# Patient Record
Sex: FEMALE | Race: WHITE | Employment: OTHER | URBAN - METROPOLITAN AREA
[De-identification: names, ages, dates, MRNs, and addresses within clinical notes are randomized per-mention and may not be internally consistent; named-entity substitution may affect disease eponyms.]

---

## 2017-09-13 ENCOUNTER — APPOINTMENT (OUTPATIENT)
Dept: CT IMAGING | Age: 77
End: 2017-09-13
Payer: MEDICARE

## 2017-09-13 ENCOUNTER — HOSPITAL ENCOUNTER (EMERGENCY)
Age: 77
Discharge: HOME | End: 2017-09-13
Attending: EMERGENCY MEDICINE
Payer: MEDICARE

## 2017-09-13 ENCOUNTER — APPOINTMENT (OUTPATIENT)
Dept: GENERAL RADIOLOGY | Age: 77
End: 2017-09-13
Payer: MEDICARE

## 2017-09-13 VITALS
HEART RATE: 88 BPM | BODY MASS INDEX: 29.45 KG/M2 | RESPIRATION RATE: 17 BRPM | DIASTOLIC BLOOD PRESSURE: 60 MMHG | OXYGEN SATURATION: 96 % | TEMPERATURE: 98 F | HEIGHT: 60 IN | SYSTOLIC BLOOD PRESSURE: 141 MMHG | WEIGHT: 150 LBS

## 2017-09-13 DIAGNOSIS — R07.9 CHEST PAIN, UNSPECIFIED TYPE: ICD-10-CM

## 2017-09-13 DIAGNOSIS — M54.2 NECK PAIN: Primary | ICD-10-CM

## 2017-09-13 LAB
ABSOLUTE EOS #: 0 K/UL (ref 0–0.4)
ABSOLUTE LYMPH #: 1.4 K/UL (ref 1–4.8)
ABSOLUTE MONO #: 0.6 K/UL (ref 0.1–1.3)
ANION GAP SERPL CALCULATED.3IONS-SCNC: 11 MMOL/L (ref 9–17)
BASOPHILS # BLD: 1 %
BASOPHILS ABSOLUTE: 0.1 K/UL (ref 0–0.2)
BUN BLDV-MCNC: 11 MG/DL (ref 8–23)
BUN/CREAT BLD: ABNORMAL (ref 9–20)
CALCIUM SERPL-MCNC: 8.6 MG/DL (ref 8.6–10.4)
CHLORIDE BLD-SCNC: 102 MMOL/L (ref 98–107)
CO2: 29 MMOL/L (ref 20–31)
CREAT SERPL-MCNC: 0.64 MG/DL (ref 0.5–0.9)
DIFFERENTIAL TYPE: NORMAL
EOSINOPHILS RELATIVE PERCENT: 1 %
GFR AFRICAN AMERICAN: >60 ML/MIN
GFR NON-AFRICAN AMERICAN: >60 ML/MIN
GFR SERPL CREATININE-BSD FRML MDRD: ABNORMAL ML/MIN/{1.73_M2}
GFR SERPL CREATININE-BSD FRML MDRD: ABNORMAL ML/MIN/{1.73_M2}
GLUCOSE BLD-MCNC: 113 MG/DL (ref 70–99)
HCT VFR BLD CALC: 39.1 % (ref 36–46)
HEMOGLOBIN: 12.9 G/DL (ref 12–16)
INR BLD: 2.6
LYMPHOCYTES # BLD: 18 %
MCH RBC QN AUTO: 27.9 PG (ref 26–34)
MCHC RBC AUTO-ENTMCNC: 32.9 G/DL (ref 31–37)
MCV RBC AUTO: 84.9 FL (ref 80–100)
MONOCYTES # BLD: 8 %
PDW BLD-RTO: 14.7 % (ref 11.5–14.9)
PLATELET # BLD: 342 K/UL (ref 150–450)
PLATELET ESTIMATE: NORMAL
PMV BLD AUTO: 6.9 FL (ref 6–12)
POTASSIUM SERPL-SCNC: 4.3 MMOL/L (ref 3.7–5.3)
PROTHROMBIN TIME: 30.1 SEC (ref 9.7–12)
RBC # BLD: 4.61 M/UL (ref 4–5.2)
RBC # BLD: NORMAL 10*6/UL
SEG NEUTROPHILS: 72 %
SEGMENTED NEUTROPHILS ABSOLUTE COUNT: 5.7 K/UL (ref 1.3–9.1)
SODIUM BLD-SCNC: 142 MMOL/L (ref 135–144)
TROPONIN INTERP: NORMAL
TROPONIN INTERP: NORMAL
TROPONIN T: <0.03 NG/ML
TROPONIN T: <0.03 NG/ML
TSH SERPL DL<=0.05 MIU/L-ACNC: 0.55 MIU/L (ref 0.3–5)
WBC # BLD: 7.9 K/UL (ref 3.5–11)
WBC # BLD: NORMAL 10*3/UL

## 2017-09-13 PROCEDURE — 85025 COMPLETE CBC W/AUTO DIFF WBC: CPT

## 2017-09-13 PROCEDURE — 70450 CT HEAD/BRAIN W/O DYE: CPT

## 2017-09-13 PROCEDURE — 71020 XR CHEST STANDARD TWO VW: CPT

## 2017-09-13 PROCEDURE — 36415 COLL VENOUS BLD VENIPUNCTURE: CPT

## 2017-09-13 PROCEDURE — 85610 PROTHROMBIN TIME: CPT

## 2017-09-13 PROCEDURE — 84443 ASSAY THYROID STIM HORMONE: CPT

## 2017-09-13 PROCEDURE — 99285 EMERGENCY DEPT VISIT HI MDM: CPT

## 2017-09-13 PROCEDURE — 80048 BASIC METABOLIC PNL TOTAL CA: CPT

## 2017-09-13 PROCEDURE — 93005 ELECTROCARDIOGRAM TRACING: CPT

## 2017-09-13 PROCEDURE — 84484 ASSAY OF TROPONIN QUANT: CPT

## 2017-09-13 ASSESSMENT — PAIN DESCRIPTION - PAIN TYPE: TYPE: ACUTE PAIN

## 2017-09-13 ASSESSMENT — PAIN DESCRIPTION - LOCATION: LOCATION: HEAD

## 2017-09-13 ASSESSMENT — PAIN SCALES - GENERAL: PAINLEVEL_OUTOF10: 2

## 2017-09-14 LAB
EKG ATRIAL RATE: 96 BPM
EKG P AXIS: 81 DEGREES
EKG P-R INTERVAL: 156 MS
EKG Q-T INTERVAL: 380 MS
EKG QRS DURATION: 68 MS
EKG QTC CALCULATION (BAZETT): 480 MS
EKG R AXIS: 30 DEGREES
EKG T AXIS: 59 DEGREES
EKG VENTRICULAR RATE: 96 BPM

## 2019-04-03 ENCOUNTER — HOSPITAL ENCOUNTER (OUTPATIENT)
Age: 79
Setting detail: SPECIMEN
Discharge: HOME | End: 2019-04-03
Payer: MEDICARE

## 2019-04-08 LAB — SURGICAL PATHOLOGY REPORT: NORMAL

## 2022-08-11 ENCOUNTER — HOSPITAL ENCOUNTER (OUTPATIENT)
Age: 82
Setting detail: SPECIMEN
Discharge: HOME | End: 2022-08-11

## 2022-08-11 LAB
ALBUMIN SERPL-MCNC: 2.7 G/DL (ref 3.5–5.2)
ALBUMIN/GLOBULIN RATIO: 0.9 (ref 1–2.5)
ALP BLD-CCNC: 186 U/L (ref 35–104)
ALT SERPL-CCNC: 42 U/L (ref 5–33)
ANION GAP SERPL CALCULATED.3IONS-SCNC: 11 MMOL/L (ref 9–17)
AST SERPL-CCNC: 27 U/L
BILIRUB SERPL-MCNC: 0.41 MG/DL (ref 0.3–1.2)
BUN BLDV-MCNC: 5 MG/DL (ref 8–23)
CALCIUM SERPL-MCNC: 7.8 MG/DL (ref 8.6–10.4)
CHLORIDE BLD-SCNC: 94 MMOL/L (ref 98–107)
CO2: 26 MMOL/L (ref 20–31)
CREAT SERPL-MCNC: 0.62 MG/DL (ref 0.5–0.9)
GFR AFRICAN AMERICAN: >60 ML/MIN
GFR NON-AFRICAN AMERICAN: >60 ML/MIN
GFR SERPL CREATININE-BSD FRML MDRD: ABNORMAL ML/MIN/{1.73_M2}
GLUCOSE BLD-MCNC: 129 MG/DL (ref 70–99)
HCT VFR BLD CALC: 28.7 % (ref 36.3–47.1)
HEMOGLOBIN: 9 G/DL (ref 11.9–15.1)
INR BLD: 1.7
MCH RBC QN AUTO: 28.8 PG (ref 25.2–33.5)
MCHC RBC AUTO-ENTMCNC: 31.4 G/DL (ref 28.4–34.8)
MCV RBC AUTO: 91.7 FL (ref 82.6–102.9)
NRBC AUTOMATED: 0.2 PER 100 WBC
PDW BLD-RTO: 15.7 % (ref 11.8–14.4)
PLATELET # BLD: ABNORMAL K/UL (ref 138–453)
PLATELET, FLUORESCENCE: 1061 K/UL (ref 138–453)
PLATELET, IMMATURE FRACTION: 0.7 % (ref 1.1–10.3)
POTASSIUM SERPL-SCNC: 4.7 MMOL/L (ref 3.7–5.3)
PROTHROMBIN TIME: 17.4 SEC (ref 9.1–12.3)
RBC # BLD: 3.13 M/UL (ref 3.95–5.11)
SODIUM BLD-SCNC: 131 MMOL/L (ref 135–144)
TOTAL PROTEIN: 5.8 G/DL (ref 6.4–8.3)
TSH SERPL DL<=0.05 MIU/L-ACNC: 66.34 UIU/ML (ref 0.3–5)
WBC # BLD: 9.9 K/UL (ref 3.5–11.3)

## 2022-08-11 PROCEDURE — 80053 COMPREHEN METABOLIC PANEL: CPT

## 2022-08-11 PROCEDURE — 84443 ASSAY THYROID STIM HORMONE: CPT

## 2022-08-11 PROCEDURE — 85610 PROTHROMBIN TIME: CPT

## 2022-08-11 PROCEDURE — 36415 COLL VENOUS BLD VENIPUNCTURE: CPT

## 2022-08-11 PROCEDURE — 85027 COMPLETE CBC AUTOMATED: CPT

## 2022-08-11 PROCEDURE — 85055 RETICULATED PLATELET ASSAY: CPT

## 2022-08-11 PROCEDURE — P9603 ONE-WAY ALLOW PRORATED MILES: HCPCS

## 2022-08-15 ENCOUNTER — HOSPITAL ENCOUNTER (OUTPATIENT)
Age: 82
Setting detail: SPECIMEN
Discharge: HOME | End: 2022-08-15

## 2022-08-15 LAB
INR BLD: 5.6
PROTHROMBIN TIME: 52.4 SEC (ref 9.1–12.3)

## 2022-08-15 PROCEDURE — 85610 PROTHROMBIN TIME: CPT

## 2022-08-15 PROCEDURE — 36415 COLL VENOUS BLD VENIPUNCTURE: CPT

## 2022-08-15 PROCEDURE — P9603 ONE-WAY ALLOW PRORATED MILES: HCPCS

## 2022-08-16 ENCOUNTER — HOSPITAL ENCOUNTER (OUTPATIENT)
Age: 82
Setting detail: SPECIMEN
Discharge: HOME | End: 2022-08-16

## 2022-08-16 LAB
HCT VFR BLD CALC: 26.1 % (ref 36.3–47.1)
HEMOGLOBIN: 8.1 G/DL (ref 11.9–15.1)
INR BLD: 2
MCH RBC QN AUTO: 28.7 PG (ref 25.2–33.5)
MCHC RBC AUTO-ENTMCNC: 31 G/DL (ref 28.4–34.8)
MCV RBC AUTO: 92.6 FL (ref 82.6–102.9)
NRBC AUTOMATED: 0 PER 100 WBC
PDW BLD-RTO: 15.6 % (ref 11.8–14.4)
PLATELET # BLD: 845 K/UL (ref 138–453)
PMV BLD AUTO: 9.3 FL (ref 8.1–13.5)
PROTHROMBIN TIME: 19.8 SEC (ref 9.1–12.3)
RBC # BLD: 2.82 M/UL (ref 3.95–5.11)
WBC # BLD: 8.2 K/UL (ref 3.5–11.3)

## 2022-08-16 PROCEDURE — 36415 COLL VENOUS BLD VENIPUNCTURE: CPT

## 2022-08-16 PROCEDURE — 85027 COMPLETE CBC AUTOMATED: CPT

## 2022-08-16 PROCEDURE — 85610 PROTHROMBIN TIME: CPT

## 2022-08-16 PROCEDURE — P9603 ONE-WAY ALLOW PRORATED MILES: HCPCS

## 2022-08-18 ENCOUNTER — HOSPITAL ENCOUNTER (OUTPATIENT)
Age: 82
Setting detail: SPECIMEN
Discharge: HOME | End: 2022-08-18

## 2022-08-18 LAB
INR BLD: 1.6
PROTHROMBIN TIME: 16.5 SEC (ref 9.1–12.3)

## 2022-08-18 PROCEDURE — 36415 COLL VENOUS BLD VENIPUNCTURE: CPT

## 2022-08-18 PROCEDURE — 85610 PROTHROMBIN TIME: CPT

## 2022-08-18 PROCEDURE — P9603 ONE-WAY ALLOW PRORATED MILES: HCPCS

## 2022-10-25 ENCOUNTER — APPOINTMENT (OUTPATIENT)
Dept: GENERAL RADIOLOGY | Age: 82
DRG: 439 | End: 2022-10-25
Payer: COMMERCIAL

## 2022-10-25 ENCOUNTER — HOSPITAL ENCOUNTER (INPATIENT)
Age: 82
LOS: 7 days | Discharge: NURSING, REHAB OR LONG-TERM CARE | DRG: 439 | End: 2022-11-01
Attending: EMERGENCY MEDICINE | Admitting: FAMILY MEDICINE
Payer: COMMERCIAL

## 2022-10-25 ENCOUNTER — APPOINTMENT (OUTPATIENT)
Dept: CT IMAGING | Age: 82
DRG: 439 | End: 2022-10-25
Payer: COMMERCIAL

## 2022-10-25 DIAGNOSIS — R29.6 FREQUENT FALLS: Primary | ICD-10-CM

## 2022-10-25 DIAGNOSIS — S02.2XXA CLOSED FRACTURE OF NASAL BONE, INITIAL ENCOUNTER: ICD-10-CM

## 2022-10-25 DIAGNOSIS — F41.9 ANXIETY: ICD-10-CM

## 2022-10-25 DIAGNOSIS — T07.XXXA MULTIPLE CONTUSIONS: ICD-10-CM

## 2022-10-25 PROBLEM — J44.9 COPD (CHRONIC OBSTRUCTIVE PULMONARY DISEASE) (HCC): Status: ACTIVE | Noted: 2022-07-01

## 2022-10-25 PROBLEM — D50.9 IRON DEFICIENCY ANEMIA: Status: ACTIVE | Noted: 2022-10-25

## 2022-10-25 PROBLEM — E03.9 HYPOTHYROIDISM: Status: ACTIVE | Noted: 2022-10-25

## 2022-10-25 PROBLEM — R53.1 GENERALIZED WEAKNESS: Status: ACTIVE | Noted: 2022-10-25

## 2022-10-25 LAB
ABSOLUTE EOS #: 0 K/UL (ref 0–0.4)
ABSOLUTE LYMPH #: 2.2 K/UL (ref 1–4.8)
ABSOLUTE MONO #: 0.7 K/UL (ref 0.1–1.3)
ANION GAP SERPL CALCULATED.3IONS-SCNC: 8 MMOL/L (ref 9–17)
BASOPHILS # BLD: 1 % (ref 0–2)
BASOPHILS ABSOLUTE: 0.1 K/UL (ref 0–0.2)
BUN BLDV-MCNC: 11 MG/DL (ref 8–23)
CALCIUM SERPL-MCNC: 7.4 MG/DL (ref 8.6–10.4)
CHLORIDE BLD-SCNC: 99 MMOL/L (ref 98–107)
CO2: 26 MMOL/L (ref 20–31)
CREAT SERPL-MCNC: 0.96 MG/DL (ref 0.5–0.9)
EOSINOPHILS RELATIVE PERCENT: 1 % (ref 0–4)
GFR SERPL CREATININE-BSD FRML MDRD: 60 ML/MIN/1.73M2
GLUCOSE BLD-MCNC: 185 MG/DL (ref 70–99)
HCT VFR BLD CALC: 32 % (ref 36–46)
HEMOGLOBIN: 10.5 G/DL (ref 12–16)
INR BLD: 2.7
LYMPHOCYTES # BLD: 37 % (ref 24–44)
MCH RBC QN AUTO: 27.6 PG (ref 26–34)
MCHC RBC AUTO-ENTMCNC: 32.7 G/DL (ref 31–37)
MCV RBC AUTO: 84.4 FL (ref 80–100)
MONOCYTES # BLD: 11 % (ref 1–7)
PARTIAL THROMBOPLASTIN TIME: 35.1 SEC (ref 24–36)
PDW BLD-RTO: 16.3 % (ref 11.5–14.9)
PLATELET # BLD: 300 K/UL (ref 150–450)
PMV BLD AUTO: 7.6 FL (ref 6–12)
POTASSIUM SERPL-SCNC: 4.8 MMOL/L (ref 3.7–5.3)
PROTHROMBIN TIME: 28.6 SEC (ref 11.8–14.6)
RBC # BLD: 3.79 M/UL (ref 4–5.2)
SEG NEUTROPHILS: 50 % (ref 36–66)
SEGMENTED NEUTROPHILS ABSOLUTE COUNT: 3 K/UL (ref 1.3–9.1)
SODIUM BLD-SCNC: 133 MMOL/L (ref 135–144)
THYROXINE, FREE: 1.02 NG/DL (ref 0.93–1.7)
TSH SERPL DL<=0.05 MIU/L-ACNC: 37.09 UIU/ML (ref 0.3–5)
WBC # BLD: 6 K/UL (ref 3.5–11)

## 2022-10-25 PROCEDURE — 80048 BASIC METABOLIC PNL TOTAL CA: CPT

## 2022-10-25 PROCEDURE — 73562 X-RAY EXAM OF KNEE 3: CPT

## 2022-10-25 PROCEDURE — 99285 EMERGENCY DEPT VISIT HI MDM: CPT

## 2022-10-25 PROCEDURE — 85610 PROTHROMBIN TIME: CPT

## 2022-10-25 PROCEDURE — 36415 COLL VENOUS BLD VENIPUNCTURE: CPT

## 2022-10-25 PROCEDURE — 6370000000 HC RX 637 (ALT 250 FOR IP): Performed by: FAMILY MEDICINE

## 2022-10-25 PROCEDURE — 73502 X-RAY EXAM HIP UNI 2-3 VIEWS: CPT

## 2022-10-25 PROCEDURE — 73080 X-RAY EXAM OF ELBOW: CPT

## 2022-10-25 PROCEDURE — 83540 ASSAY OF IRON: CPT

## 2022-10-25 PROCEDURE — 2580000003 HC RX 258: Performed by: FAMILY MEDICINE

## 2022-10-25 PROCEDURE — 70486 CT MAXILLOFACIAL W/O DYE: CPT

## 2022-10-25 PROCEDURE — 72125 CT NECK SPINE W/O DYE: CPT

## 2022-10-25 PROCEDURE — 93005 ELECTROCARDIOGRAM TRACING: CPT | Performed by: FAMILY MEDICINE

## 2022-10-25 PROCEDURE — 70450 CT HEAD/BRAIN W/O DYE: CPT

## 2022-10-25 PROCEDURE — 84443 ASSAY THYROID STIM HORMONE: CPT

## 2022-10-25 PROCEDURE — 85730 THROMBOPLASTIN TIME PARTIAL: CPT

## 2022-10-25 PROCEDURE — 85025 COMPLETE CBC W/AUTO DIFF WBC: CPT

## 2022-10-25 PROCEDURE — 2580000003 HC RX 258: Performed by: EMERGENCY MEDICINE

## 2022-10-25 PROCEDURE — 2060000000 HC ICU INTERMEDIATE R&B

## 2022-10-25 PROCEDURE — 84439 ASSAY OF FREE THYROXINE: CPT

## 2022-10-25 PROCEDURE — 83550 IRON BINDING TEST: CPT

## 2022-10-25 RX ORDER — PANTOPRAZOLE SODIUM 20 MG/1
20 TABLET, DELAYED RELEASE ORAL DAILY
COMMUNITY

## 2022-10-25 RX ORDER — POTASSIUM CHLORIDE 7.45 MG/ML
10 INJECTION INTRAVENOUS PRN
Status: DISCONTINUED | OUTPATIENT
Start: 2022-10-25 | End: 2022-11-01 | Stop reason: HOSPADM

## 2022-10-25 RX ORDER — WARFARIN SODIUM 2.5 MG/1
1.25 TABLET ORAL
Status: COMPLETED | OUTPATIENT
Start: 2022-10-25 | End: 2022-10-25

## 2022-10-25 RX ORDER — INSULIN LISPRO 100 [IU]/ML
0-4 INJECTION, SOLUTION INTRAVENOUS; SUBCUTANEOUS
Status: DISCONTINUED | OUTPATIENT
Start: 2022-10-25 | End: 2022-11-01 | Stop reason: HOSPADM

## 2022-10-25 RX ORDER — LEVOTHYROXINE SODIUM 88 UG/1
88 TABLET ORAL DAILY
Status: DISCONTINUED | OUTPATIENT
Start: 2022-10-26 | End: 2022-10-25

## 2022-10-25 RX ORDER — MAGNESIUM SULFATE 1 G/100ML
1000 INJECTION INTRAVENOUS PRN
Status: DISCONTINUED | OUTPATIENT
Start: 2022-10-25 | End: 2022-11-01 | Stop reason: HOSPADM

## 2022-10-25 RX ORDER — SODIUM CHLORIDE 9 MG/ML
INJECTION, SOLUTION INTRAVENOUS CONTINUOUS
Status: ACTIVE | OUTPATIENT
Start: 2022-10-25 | End: 2022-10-26

## 2022-10-25 RX ORDER — ONDANSETRON 4 MG/1
4 TABLET, ORALLY DISINTEGRATING ORAL EVERY 8 HOURS PRN
Status: DISCONTINUED | OUTPATIENT
Start: 2022-10-25 | End: 2022-11-01 | Stop reason: HOSPADM

## 2022-10-25 RX ORDER — LEVOTHYROXINE SODIUM 0.1 MG/1
100 TABLET ORAL DAILY
Status: DISCONTINUED | OUTPATIENT
Start: 2022-10-26 | End: 2022-11-01 | Stop reason: HOSPADM

## 2022-10-25 RX ORDER — INSULIN LISPRO 100 [IU]/ML
1-4 INJECTION, SOLUTION INTRAVENOUS; SUBCUTANEOUS
COMMUNITY

## 2022-10-25 RX ORDER — ACETAMINOPHEN 650 MG/1
650 SUPPOSITORY RECTAL EVERY 6 HOURS PRN
Status: DISCONTINUED | OUTPATIENT
Start: 2022-10-25 | End: 2022-11-01 | Stop reason: HOSPADM

## 2022-10-25 RX ORDER — POTASSIUM CHLORIDE 750 MG/1
20 TABLET, EXTENDED RELEASE ORAL DAILY
COMMUNITY

## 2022-10-25 RX ORDER — DEXTROSE MONOHYDRATE 100 MG/ML
INJECTION, SOLUTION INTRAVENOUS CONTINUOUS PRN
Status: DISCONTINUED | OUTPATIENT
Start: 2022-10-25 | End: 2022-11-01 | Stop reason: HOSPADM

## 2022-10-25 RX ORDER — 0.9 % SODIUM CHLORIDE 0.9 %
1000 INTRAVENOUS SOLUTION INTRAVENOUS ONCE
Status: COMPLETED | OUTPATIENT
Start: 2022-10-25 | End: 2022-10-25

## 2022-10-25 RX ORDER — SODIUM CHLORIDE 0.9 % (FLUSH) 0.9 %
10 SYRINGE (ML) INJECTION EVERY 12 HOURS SCHEDULED
Status: DISCONTINUED | OUTPATIENT
Start: 2022-10-25 | End: 2022-11-01 | Stop reason: HOSPADM

## 2022-10-25 RX ORDER — ACETAMINOPHEN 325 MG/1
650 TABLET ORAL EVERY 6 HOURS PRN
Status: DISCONTINUED | OUTPATIENT
Start: 2022-10-25 | End: 2022-11-01 | Stop reason: HOSPADM

## 2022-10-25 RX ORDER — FERROUS SULFATE 325(65) MG
325 TABLET ORAL
Status: DISCONTINUED | OUTPATIENT
Start: 2022-10-26 | End: 2022-11-01 | Stop reason: HOSPADM

## 2022-10-25 RX ORDER — FUROSEMIDE 40 MG/1
40 TABLET ORAL DAILY
Status: ON HOLD | COMMUNITY
End: 2022-10-31 | Stop reason: HOSPADM

## 2022-10-25 RX ORDER — ONDANSETRON 2 MG/ML
4 INJECTION INTRAMUSCULAR; INTRAVENOUS EVERY 6 HOURS PRN
Status: DISCONTINUED | OUTPATIENT
Start: 2022-10-25 | End: 2022-11-01 | Stop reason: HOSPADM

## 2022-10-25 RX ORDER — IPRATROPIUM BROMIDE AND ALBUTEROL SULFATE 2.5; .5 MG/3ML; MG/3ML
1 SOLUTION RESPIRATORY (INHALATION) EVERY 4 HOURS PRN
Status: DISCONTINUED | OUTPATIENT
Start: 2022-10-25 | End: 2022-11-01 | Stop reason: HOSPADM

## 2022-10-25 RX ORDER — POTASSIUM CHLORIDE 20 MEQ/1
40 TABLET, EXTENDED RELEASE ORAL PRN
Status: DISCONTINUED | OUTPATIENT
Start: 2022-10-25 | End: 2022-11-01 | Stop reason: HOSPADM

## 2022-10-25 RX ORDER — SODIUM CHLORIDE 0.9 % (FLUSH) 0.9 %
10 SYRINGE (ML) INJECTION PRN
Status: DISCONTINUED | OUTPATIENT
Start: 2022-10-25 | End: 2022-11-01 | Stop reason: HOSPADM

## 2022-10-25 RX ORDER — FERROUS SULFATE 325(65) MG
325 TABLET ORAL EVERY OTHER DAY
Status: ON HOLD | COMMUNITY
End: 2022-10-31 | Stop reason: HOSPADM

## 2022-10-25 RX ORDER — INSULIN LISPRO 100 [IU]/ML
0-4 INJECTION, SOLUTION INTRAVENOUS; SUBCUTANEOUS NIGHTLY
Status: DISCONTINUED | OUTPATIENT
Start: 2022-10-25 | End: 2022-11-01 | Stop reason: HOSPADM

## 2022-10-25 RX ORDER — SODIUM CHLORIDE 9 MG/ML
INJECTION, SOLUTION INTRAVENOUS PRN
Status: DISCONTINUED | OUTPATIENT
Start: 2022-10-25 | End: 2022-11-01 | Stop reason: HOSPADM

## 2022-10-25 RX ADMIN — WARFARIN SODIUM 1.25 MG: 2.5 TABLET ORAL at 19:29

## 2022-10-25 RX ADMIN — SODIUM CHLORIDE 1000 ML: 9 INJECTION, SOLUTION INTRAVENOUS at 13:45

## 2022-10-25 RX ADMIN — SODIUM CHLORIDE: 9 INJECTION, SOLUTION INTRAVENOUS at 18:52

## 2022-10-25 ASSESSMENT — ENCOUNTER SYMPTOMS
FACIAL SWELLING: 0
RHINORRHEA: 0
DIARRHEA: 0
NAUSEA: 0
CONSTIPATION: 0
TROUBLE SWALLOWING: 0
CHEST TIGHTNESS: 0
EYE REDNESS: 0
EYE PAIN: 0
SINUS PRESSURE: 0
COUGH: 0
COLOR CHANGE: 0
VOMITING: 0
WHEEZING: 0
BACK PAIN: 0
ABDOMINAL PAIN: 0
SORE THROAT: 0
BLOOD IN STOOL: 0
EYE DISCHARGE: 0
EYE ITCHING: 0
SHORTNESS OF BREATH: 0

## 2022-10-25 NOTE — ED NOTES
Pt ambulated to Restroom with an assist. Pt appears to be unstable and dizzy.       Marleny Byers RN  10/25/22 [id number]

## 2022-10-25 NOTE — ED NOTES
Pt presents to ED from home by ems with reports of falling while standing. Pt states that she was dizziness prior to falling but has been dizzy since her pcp switched her meds a few weeks ago and was on the way to be evaluated when she fell. Pt is on warfarin. Pt denies loc. Per ems pt was pos orthos.       James Wells RN  10/25/22 1300

## 2022-10-25 NOTE — CONSULTS
General Surgery Consult      Pt Name: Swati Sunshine  MRN: [id number]  YOB: 1940  Date of evaluation: 10/25/2022  Primary Care Physician: DANDRE Reed CNP   Patient evaluated at the request of  Dr. Soheila Obrien 19  Reason for evaluation: History of fall    SUBJECTIVE:   History of Chief Complaint:    Swati Sunshine is a [de-identified] y.o. female who presents with history of repeated fall. She had a mechanical fall when she was walking and tripped over a cane. Consuelo Chock on her knees and then face. No loss of consciousness. Some dizziness in the last couple weeks. Her medications are being adjusted. Multiple falls in the last couple weeks. Case discussed with the emergency room physician. Denies any chest pain abdominal pain or neck pain. ER work-up reviewed. Past Medical History   has a past medical history of Atrial fibrillation ([address] Hypertension, and Thyroid disease. Past Surgical History   has a past surgical history that includes Carpal tunnel release (Bilateral). Medications  Prior to Admission medications    Medication Sig Start Date End Date Taking?  Authorizing Provider   furosemide (LASIX) 40 MG tablet Take 40 mg by mouth daily   Yes Historical Provider, MD   pantoprazole (PROTONIX) 20 MG tablet Take 20 mg by mouth daily   Yes Historical Provider, MD   lipase-protease-amylase (CREON) 6000-98555 units delayed release capsule Take 1 capsule by mouth 3 times daily (with meals)   Yes Historical Provider, MD   potassium chloride (KLOR-CON M) 10 MEQ extended release tablet Take 20 mEq by mouth daily   Yes Historical Provider, MD   insulin NPH (HUMULIN N;NOVOLIN N) 100 UNIT/ML injection vial Inject 5 Units into the skin every morning   Yes Historical Provider, MD   insulin lispro, 1 Unit Dial, (HUMALOG KWIKPEN) 100 UNIT/ML SOPN Inject 1-4 Units into the skin 3 times daily (before meals)   Yes Historical Provider, MD   ferrous sulfate (IRON 325) 325 (65 Fe) MG tablet Take 325 mg by mouth every other day   Yes Historical Provider, MD   levothyroxine (SYNTHROID) 88 MCG tablet Take 88 mcg by mouth Daily    Historical Provider, MD   clonazePAM (KLONOPIN) 0.5 MG tablet Take 0.5 mg by mouth daily. Historical Provider, MD   warfarin (COUMADIN) 2.5 MG tablet Take 1.25 mg by mouth daily Per [facility]    Historical Provider, MD   metoprolol (TOPROL-XL) 50 MG XL tablet Take 50 mg by mouth in the morning and at bedtime Indications: ASH    Historical Provider, MD     Allergies  is allergic to latex, pcn [penicillins], dilaudid [hydromorphone], norco [hydrocodone-acetaminophen], nitrofurantoin, and sulfa antibiotics. Family History  family history is not on file. Social History   reports that she has never smoked. She does not have any smokeless tobacco history on file. She reports that she does not drink alcohol and does not use drugs. Review of Systems:  All 10 system review was conducted. Please refer to chart. OBJECTIVE:   VITALS:  height is 5' (1.524 m) and weight is 105 lb 13.1 oz (48 kg). Her oral temperature is 98.7 °F (37.1 °C). Her blood pressure is 124/82 and her pulse is 80. Her respiration is 20 and oxygen saturation is 99%. CONSTITUTIONAL: Alert and oriented times 3, no acute distress and cooperative to examination with proper mood and affect. SKIN: Skin color, texture, turgor normal. No rashes or lesions. LYMPH: no cervical nodes, no inguinal nodes  HEENT: Some bruising around the right side of the face nose region findings consistent with nasal bone fracture, Head is normocephalic,  EOMI, PERRLA  NECK: Supple, symmetrical, trachea midline, no adenopathy, thyroid symmetric, not enlarged and no tenderness, skin normal  CHEST/LUNGS: chest symmetric with normal A/P diameter, normal respiratory rate and rhythm, lungs clear to auscultation without wheezes, rales or rhonchi. No accessory muscle use.  Scars None   CARDIOVASCULAR: Heart regular rate and rhythm Normal S1 and S2. . Carotid and femoral pulses 2+/4 and equal bilaterally  ABDOMEN: Soft abdomen nondistended nontender   RECTAL: deferred, not clinically indicated  NEUROLOGIC: There are no focalizing motor or sensory deficits. CN II-XII are grossly intact.   EXTREMITIES: no cyanosis, no clubbing, and no edema    LABS:   CBC with Differential:    Lab Results   Component Value Date/Time    WBC 6.0 10/25/2022 12:53 PM    RBC 3.79 10/25/2022 12:53 PM    RBC 4.42 03/21/2012 01:10 PM    HGB 10.5 10/25/2022 12:53 PM    HCT 32.0 10/25/2022 12:53 PM     10/25/2022 12:53 PM     03/21/2012 01:10 PM    MCV 84.4 10/25/2022 12:53 PM    MCH 27.6 10/25/2022 12:53 PM    MCHC 32.7 10/25/2022 12:53 PM    RDW 16.3 10/25/2022 12:53 PM    LYMPHOPCT 37 10/25/2022 12:53 PM    MONOPCT 11 10/25/2022 12:53 PM    BASOPCT 1 10/25/2022 12:53 PM    MONOSABS 0.70 10/25/2022 12:53 PM    LYMPHSABS 2.20 10/25/2022 12:53 PM    EOSABS 0.00 10/25/2022 12:53 PM    BASOSABS 0.10 10/25/2022 12:53 PM    DIFFTYPE NOT REPORTED 09/13/2017 04:50 PM     BMP:    Lab Results   Component Value Date/Time     10/25/2022 12:53 PM    K 4.8 10/25/2022 12:53 PM    CL 99 10/25/2022 12:53 PM    CO2 26 10/25/2022 12:53 PM    BUN 11 10/25/2022 12:53 PM    LABALBU 2.7 08/11/2022 09:05 AM    CREATININE 0.96 10/25/2022 12:53 PM    CALCIUM 7.4 10/25/2022 12:53 PM    GFRAA >60 08/11/2022 09:05 AM    LABGLOM 60 10/25/2022 12:53 PM    GLUCOSE 185 10/25/2022 12:53 PM     Hepatic Function Panel:    Lab Results   Component Value Date/Time    ALKPHOS 186 08/11/2022 09:05 AM    ALT 42 08/11/2022 09:05 AM    AST 27 08/11/2022 09:05 AM    PROT 5.8 08/11/2022 09:05 AM    BILITOT 0.41 08/11/2022 09:05 AM    LABALBU 2.7 08/11/2022 09:05 AM     Calcium:    Lab Results   Component Value Date/Time    CALCIUM 7.4 10/25/2022 12:53 PM     Magnesium:  No results found for: MG  Phosphorus:  No results found for: PHOS  PT/INR:    Lab Results   Component Value Date/Time    PROTIME 28.6 10/25/2022 12:53 PM    INR 2.7 10/25/2022 12:53 PM     ABG:  No results found for: PHART, PH, XOG3HCW, PCO2, PO2ART, PO2, YNI4YGZ, HCO3, BEART, BE, THGBART, THB, THO8JUM, N7KDCOWP, O2SAT  Urine Culture:  No components found for: CURINE  Blood Culture:  No components found for: CBLOOD, CFUNGUSBL  Stool Culture:  No components found for: CSTOOL    RADIOLOGY:   I have personally reviewed the following films:  XR ELBOW LEFT (MIN 3 VIEWS)    Result Date: 10/25/2022  EXAMINATION: THREE XRAY VIEWS OF THE LEFT ELBOW 10/25/2022 1:53 pm COMPARISON: None. HISTORY: ORDERING SYSTEM PROVIDED HISTORY: Pain TECHNOLOGIST PROVIDED HISTORY: Pain Reason for Exam: Pt. states pain in left elbow, both knees, both hips s/p fall 81-year-old female with left elbow pain after a fall FINDINGS: Osseous alignment is normal.  Mild degenerative changes of the ulnohumeral joint. Remaining joint spaces well maintained. No acute fracture or gross dislocation is seen. The radial head and radial neck appear intact. There is no significant elevation of the posterior fat pad or sail sign to suggest a joint effusion. 1. Mild degenerative changes of the ulnohumeral joint. 2. No acute fracture or dislocation. XR HIP LEFT (2-3 VIEWS)    Result Date: 10/25/2022  EXAMINATION: THREE XRAY VIEWS OF THE LEFT KNEE; TWO XRAY VIEWS OF THE LEFT HIP; THREE XRAY VIEWS OF THE RIGHT KNEE; TWO XRAY VIEWS OF THE RIGHT HIP 10/25/2022 1:53 pm COMPARISON: 03/08/2010, 08/26/2013 HISTORY: ORDERING SYSTEM PROVIDED HISTORY: Pain TECHNOLOGIST PROVIDED HISTORY: Pain Reason for Exam: Pt. states pain in left elbow, both knees, both hips s/p fall FINDINGS: Right hip: Mild degenerative changes of the right hip joint. Mild osteopenia. The visualized pelvic ring is grossly intact. No acute fracture or dislocation. Soft tissues are unremarkable. Left hip: Mild degenerative changes the left hip joint. Mild osteopenia. No acute fracture or dislocation.   Soft tissues are otherwise unremarkable. Right knee: Mild osteopenia. Mild tricompartmental degenerative changes. No acute fracture or dislocation. Patellar enthesophytes. No significant joint effusion. Soft tissues are otherwise unremarkable. Left knee: Osteopenia. No acute fracture or dislocation. Mild tricompartmental degenerative changes. No acute fracture or dislocation. Joint space and alignment otherwise maintained. No significant joint effusion. No acute osseous abnormality in the bilateral hips or knees. Given the degree of osteopenia, nondisplaced fractures may be radiographically occult. If pain or concern for fracture persists, consider CT or MR imaging. XR HIP RIGHT (2-3 VIEWS)    Result Date: 10/25/2022  EXAMINATION: THREE XRAY VIEWS OF THE LEFT KNEE; TWO XRAY VIEWS OF THE LEFT HIP; THREE XRAY VIEWS OF THE RIGHT KNEE; TWO XRAY VIEWS OF THE RIGHT HIP 10/25/2022 1:53 pm COMPARISON: 03/08/2010, 08/26/2013 HISTORY: ORDERING SYSTEM PROVIDED HISTORY: Pain TECHNOLOGIST PROVIDED HISTORY: Pain Reason for Exam: Pt. states pain in left elbow, both knees, both hips s/p fall FINDINGS: Right hip: Mild degenerative changes of the right hip joint. Mild osteopenia. The visualized pelvic ring is grossly intact. No acute fracture or dislocation. Soft tissues are unremarkable. Left hip: Mild degenerative changes the left hip joint. Mild osteopenia. No acute fracture or dislocation. Soft tissues are otherwise unremarkable. Right knee: Mild osteopenia. Mild tricompartmental degenerative changes. No acute fracture or dislocation. Patellar enthesophytes. No significant joint effusion. Soft tissues are otherwise unremarkable. Left knee: Osteopenia. No acute fracture or dislocation. Mild tricompartmental degenerative changes. No acute fracture or dislocation. Joint space and alignment otherwise maintained. No significant joint effusion. No acute osseous abnormality in the bilateral hips or knees.   Given the degree of osteopenia, nondisplaced fractures may be radiographically occult. If pain or concern for fracture persists, consider CT or MR imaging. XR KNEE LEFT (3 VIEWS)    Result Date: 10/25/2022  EXAMINATION: THREE XRAY VIEWS OF THE LEFT KNEE; TWO XRAY VIEWS OF THE LEFT HIP; THREE XRAY VIEWS OF THE RIGHT KNEE; TWO XRAY VIEWS OF THE RIGHT HIP 10/25/2022 1:53 pm COMPARISON: 03/08/2010, 08/26/2013 HISTORY: ORDERING SYSTEM PROVIDED HISTORY: Pain TECHNOLOGIST PROVIDED HISTORY: Pain Reason for Exam: Pt. states pain in left elbow, both knees, both hips s/p fall FINDINGS: Right hip: Mild degenerative changes of the right hip joint. Mild osteopenia. The visualized pelvic ring is grossly intact. No acute fracture or dislocation. Soft tissues are unremarkable. Left hip: Mild degenerative changes the left hip joint. Mild osteopenia. No acute fracture or dislocation. Soft tissues are otherwise unremarkable. Right knee: Mild osteopenia. Mild tricompartmental degenerative changes. No acute fracture or dislocation. Patellar enthesophytes. No significant joint effusion. Soft tissues are otherwise unremarkable. Left knee: Osteopenia. No acute fracture or dislocation. Mild tricompartmental degenerative changes. No acute fracture or dislocation. Joint space and alignment otherwise maintained. No significant joint effusion. No acute osseous abnormality in the bilateral hips or knees. Given the degree of osteopenia, nondisplaced fractures may be radiographically occult. If pain or concern for fracture persists, consider CT or MR imaging.      XR KNEE RIGHT (3 VIEWS)    Result Date: 10/25/2022  EXAMINATION: THREE XRAY VIEWS OF THE LEFT KNEE; TWO XRAY VIEWS OF THE LEFT HIP; THREE XRAY VIEWS OF THE RIGHT KNEE; TWO XRAY VIEWS OF THE RIGHT HIP 10/25/2022 1:53 pm COMPARISON: 03/08/2010, 08/26/2013 HISTORY: ORDERING SYSTEM PROVIDED HISTORY: Pain TECHNOLOGIST PROVIDED HISTORY: Pain Reason for Exam: Pt. states pain in left elbow, both knees, both hips s/p fall FINDINGS: Right hip: Mild degenerative changes of the right hip joint. Mild osteopenia. The visualized pelvic ring is grossly intact. No acute fracture or dislocation. Soft tissues are unremarkable. Left hip: Mild degenerative changes the left hip joint. Mild osteopenia. No acute fracture or dislocation. Soft tissues are otherwise unremarkable. Right knee: Mild osteopenia. Mild tricompartmental degenerative changes. No acute fracture or dislocation. Patellar enthesophytes. No significant joint effusion. Soft tissues are otherwise unremarkable. Left knee: Osteopenia. No acute fracture or dislocation. Mild tricompartmental degenerative changes. No acute fracture or dislocation. Joint space and alignment otherwise maintained. No significant joint effusion. No acute osseous abnormality in the bilateral hips or knees. Given the degree of osteopenia, nondisplaced fractures may be radiographically occult. If pain or concern for fracture persists, consider CT or MR imaging. CT HEAD WO CONTRAST    Result Date: 10/25/2022  EXAMINATION: CT OF THE HEAD WITHOUT CONTRAST  10/25/2022 1:17 pm TECHNIQUE: CT of the head was performed without the administration of intravenous contrast. Automated exposure control, iterative reconstruction, and/or weight based adjustment of the mA/kV was utilized to reduce the radiation dose to as low as reasonably achievable. COMPARISON: 09/13/2017. HISTORY: ORDERING SYSTEM PROVIDED HISTORY: Trauma TECHNOLOGIST PROVIDED HISTORY: Trauma Decision Support Exception - unselect if not a suspected or confirmed emergency medical condition->Emergency Medical Condition (MA) Reason for Exam: Trauma Additional signs and symptoms: Pt c/o fall on face today. Bruising on dose and right side by eye FINDINGS: BRAIN/VENTRICLES: There is no acute intracranial hemorrhage, mass effect or midline shift. No abnormal extra-axial fluid collection.   The gray-white differentiation is maintained without evidence of an acute infarct. There is no evidence of hydrocephalus. Few nonspecific white matter changes bilaterally likely representing mild chronic microangiopathy. Mild, diffuse parenchymal volume loss. ORBITS: The visualized portion of the orbits demonstrate no acute abnormality. SINUSES: The visualized paranasal sinuses and mastoid air cells demonstrate no acute abnormality. SOFT TISSUES/SKULL:  No acute abnormality of the visualized skull or soft tissues. No acute intracranial abnormality identified by CT. CT FACIAL BONES WO CONTRAST    Result Date: 10/25/2022  EXAMINATION: CT OF THE FACE WITHOUT CONTRAST  10/25/2022 1:17 pm TECHNIQUE: CT of the face was performed without the administration of intravenous contrast. Multiplanar reformatted images are provided for review. Automated exposure control, iterative reconstruction, and/or weight based adjustment of the mA/kV was utilized to reduce the radiation dose to as low as reasonably achievable. COMPARISON: None HISTORY: ORDERING SYSTEM PROVIDED HISTORY: Fall, pain TECHNOLOGIST PROVIDED HISTORY: Fall, pain Decision Support Exception - unselect if not a suspected or confirmed emergency medical condition->Emergency Medical Condition (MA) Reason for Exam: Fall, pain Additional signs and symptoms: Fall today on face, bruising to nose and mainly right side FINDINGS: FACIAL BONES: The frontal sinuses, orbital walls, maxilla, pterygoid plates, zygomatic arches, hard palate, and mandible are intact. Acute nondisplaced anterior nasal bone fractures bilaterally with overlying soft tissue swelling. The temporomandibular joints are aligned. ORBITAL CONTENTS: The globes appear intact. The extraocular muscles, optic nerve sheath complexes and lacrimal glands appear unremarkable. No retrobulbar hematoma or mass is seen. SINUSES: Mild right maxillary sinus mucosal thickening. The mastoid air cells are clear.  SOFT TISSUES: Mild nasal soft tissue swelling. Acute nondisplaced nasal bone fractures bilaterally. CT CERVICAL SPINE WO CONTRAST    Result Date: 10/25/2022  EXAMINATION: CT OF THE CERVICAL SPINE WITHOUT CONTRAST 10/25/2022 1:17 pm TECHNIQUE: CT of the cervical spine was performed without the administration of intravenous contrast. Multiplanar reformatted images are provided for review. Automated exposure control, iterative reconstruction, and/or weight based adjustment of the mA/kV was utilized to reduce the radiation dose to as low as reasonably achievable. COMPARISON: None. HISTORY: ORDERING SYSTEM PROVIDED HISTORY: Fall TECHNOLOGIST PROVIDED HISTORY: Fall Decision Support Exception - unselect if not a suspected or confirmed emergency medical condition->Emergency Medical Condition (MA) Reason for Exam: Fall Additional signs and symptoms: Pt fell on face today, facial trauma and bruising FINDINGS: BONES/ALIGNMENT: There is no acute fracture or traumatic malalignment. Minimal degenerative retrolisthesis of C5 and C6 measuring 0.2 cm. DEGENERATIVE CHANGES: Moderate multilevel disc disease. SOFT TISSUES: There is no prevertebral soft tissue swelling. No acute cervical spine fracture. IMPRESSION:   History of recurrent fall  Dizziness   Nasal bone fracture. No other obvious injuries. CT of the head and the C-spine was negative. Blood work reviewed. Potassium 4.8. Creatinine is normal.  WBC count normal.  Hemoglobin 10.5.    does not have any pertinent problems on file. PLAN:   Admission to the hospital for medical evaluation. No acute general surgical issues. Patient has a acute nondisplaced nasal bone fracture. Follow-up with ENT or oral maxillofacial surgery or plastics on an outpatient basis. Diet as tolerated. Discussed with patient and family at length. Cardiology on the case regarding work-up for dizziness.       Thank you for this interesting consult and for allowing us to participate in the care of this patient. If you have any questions please don't hesitate to call.           Electronically signed by Sandhya Nieves MD  on 10/25/2022 at 6:10 PM

## 2022-10-25 NOTE — H&P
History and Physical      Name: Kaia Nunez  MRN: [id number]     [place]: [de-identified]  Room: [facility]2108-    Admit Date: 10/25/2022  PCP: DANDRE Alvarado CNP      Chief Complaint:     Chief Complaint   Patient presents with    Fall     Hit head, no loc       History Obtained From:     patient, electronic medical record    History of Present Illness:      Kaia Nunez is a  [de-identified] y.o.  female who presents with Fall (Hit head, no loc)   Patient states that she had mechanical fall where she was walking tripped over her cane. Patient fell on her knees and then her face. Patient denies loss of consciousness. Patient has dizziness. Patient complains bilateral knee pain which is moderate intermittent nonradiating aggravated by movement. Patient denies chest pain shortness of breath palpitations headache visual change difficulty in speech difficulty swallowing diaphoresis nausea vomiting abdominal pain dysuria hematuria diarrhea constipation numbness and tingling in extremities fever or chills    Past Medical History:     Past Medical History:   Diagnosis Date    Atrial fibrillation ([address]     Hypertension     Thyroid disease         Past Surgical History:     Past Surgical History:   Procedure Laterality Date    CARPAL TUNNEL RELEASE Bilateral         Medications Prior to Admission:       Prior to Admission medications    Medication Sig Start Date End Date Taking?  Authorizing Provider   furosemide (LASIX) 40 MG tablet Take 40 mg by mouth daily   Yes Historical Provider, MD   pantoprazole (PROTONIX) 20 MG tablet Take 20 mg by mouth daily   Yes Historical Provider, MD   lipase-protease-amylase (CREON) 6000-41976 units delayed release capsule Take 1 capsule by mouth 3 times daily (with meals)   Yes Historical Provider, MD   potassium chloride (KLOR-CON M) 10 MEQ extended release tablet Take 20 mEq by mouth daily   Yes Historical Provider, MD   insulin NPH (HUMULIN N;NOVOLIN N) 100 UNIT/ML injection vial Inject 5 Units into the skin every morning   Yes Historical Provider, MD   insulin lispro, 1 Unit Dial, (HUMALOG KWIKPEN) 100 UNIT/ML SOPN Inject 1-4 Units into the skin 3 times daily (before meals)   Yes Historical Provider, MD   ferrous sulfate (IRON 325) 325 (65 Fe) MG tablet Take 325 mg by mouth every other day   Yes Historical Provider, MD   levothyroxine (SYNTHROID) 88 MCG tablet Take 88 mcg by mouth Daily    Historical Provider, MD   clonazePAM (KLONOPIN) 0.5 MG tablet Take 0.5 mg by mouth daily. Historical Provider, MD   warfarin (COUMADIN) 2.5 MG tablet Take 1.25 mg by mouth daily Per [facility]    Historical Provider, MD   metoprolol (TOPROL-XL) 50 MG XL tablet Take 50 mg by mouth in the morning and at bedtime Indications: ASH    Historical Provider, MD        Allergies:       Latex, Pcn [penicillins], Dilaudid [hydromorphone], Norco [hydrocodone-acetaminophen], Nitrofurantoin, and Sulfa antibiotics    Social History:     Tobacco:    reports that she has never smoked. She does not have any smokeless tobacco history on file. Alcohol:      reports no history of alcohol use. Drug Use:  reports no history of drug use. Family History:     No family history on file. Review of Systems:     Positive and Negative as described in HPI   all 10 systems are reviewed and negative except as Noted      Review of Systems   Constitutional:  Negative for chills and fatigue. HENT:  Negative for drooling, mouth sores, nosebleeds, sneezing and trouble swallowing. Ecchymosis and abrasion below right eye and  nasal bone pain   Eyes:  Negative for redness and itching. Respiratory:  Negative for cough, chest tightness and shortness of breath. Cardiovascular:  Negative for chest pain, palpitations and leg swelling. Gastrointestinal:  Negative for abdominal pain, blood in stool, diarrhea, nausea and vomiting. Endocrine: Negative for heat intolerance and polyphagia. Genitourinary:  Negative for difficulty urinating, dysuria, flank pain and pelvic pain. Musculoskeletal:  Negative for arthralgias, joint swelling and neck stiffness. Skin:  Negative for color change and pallor. Allergic/Immunologic: Negative for food allergies. Neurological:  Positive for dizziness. Negative for seizures and headaches. Hematological:  Does not bruise/bleed easily. Psychiatric/Behavioral:  Negative for agitation, behavioral problems and suicidal ideas. The patient is not hyperactive. Code Status:  Full Code    Physical Exam:     Vitals:  /82   Pulse 74   Temp 98.7 °F (37.1 °C) (Oral)   Resp 20   SpO2 99%   Temp (24hrs), Av.7 °F (37.1 °C), Min:98.7 °F (37.1 °C), Max:98.7 °F (37.1 °C)        Physical Exam  Vitals reviewed. HENT:      Head: Normocephalic. Comments: Tender and swelling nasal bone     Right Ear: Ear canal and external ear normal.      Left Ear: Ear canal and external ear normal.      Nose: No nasal deformity or septal deviation. Right Nostril: No epistaxis or septal hematoma. Left Nostril: No epistaxis or septal hematoma. Mouth/Throat:      Mouth: Mucous membranes are moist.      Pharynx: Oropharynx is clear. Eyes:      Extraocular Movements: Extraocular movements intact. Conjunctiva/sclera: Conjunctivae normal.      Pupils: Pupils are equal, round, and reactive to light. Comments: Ecchymosis and abrasion below right eye. No step-off sign upon palpation of facial bones   Cardiovascular:      Rate and Rhythm: Normal rate and regular rhythm. Pulses: Normal pulses. Heart sounds: Normal heart sounds. Pulmonary:      Effort: Pulmonary effort is normal.      Breath sounds: Normal breath sounds. No wheezing or rales. Abdominal:      General: Bowel sounds are normal.      Palpations: Abdomen is soft. Tenderness: There is no abdominal tenderness. There is no right CVA tenderness or left CVA tenderness. Musculoskeletal:      Cervical back: Normal range of motion and neck supple. No tenderness (C-spine non tender with no step-off signs). Right lower leg: Edema (1+) present. Left lower leg: Edema (1+) present. Comments: T-spine, L-spine nontender with no step-off signs. Bilateral hip: mild tenderness  Bilateral knees: mild swelling ,ecchymosis noted . ligamentous stability intact. .  Left elbow: mild swelling dorsal and ecchymosis. 5 mm dorsal abrasion left elbow noted     Lymphadenopathy:      Cervical: No cervical adenopathy. Skin:     General: Skin is warm. Capillary Refill: Capillary refill takes less than 2 seconds. Coloration: Skin is not jaundiced. Neurological:      General: No focal deficit present. Mental Status: She is alert. Mental status is at baseline.    Psychiatric:         Mood and Affect: Mood normal.         Behavior: Behavior normal.             Data:     Recent Results (from the past 24 hour(s))   CBC with Auto Differential    Collection Time: 10/25/22 12:53 PM   Result Value Ref Range    WBC 6.0 3.5 - 11.0 k/uL    RBC 3.79 (L) 4.0 - 5.2 m/uL    Hemoglobin 10.5 (L) 12.0 - 16.0 g/dL    Hematocrit 32.0 (L) 36 - 46 %    MCV 84.4 80 - 100 fL    MCH 27.6 26 - 34 pg    MCHC 32.7 31 - 37 g/dL    RDW 16.3 (H) 11.5 - 14.9 %    Platelets 614 654 - 167 k/uL    MPV 7.6 6.0 - 12.0 fL    Seg Neutrophils 50 36 - 66 %    Lymphocytes 37 24 - 44 %    Monocytes 11 (H) 1 - 7 %    Eosinophils % 1 0 - 4 %    Basophils 1 0 - 2 %    Segs Absolute 3.00 1.3 - 9.1 k/uL    Absolute Lymph # 2.20 1.0 - 4.8 k/uL    Absolute Mono # 0.70 0.1 - 1.3 k/uL    Absolute Eos # 0.00 0.0 - 0.4 k/uL    Basophils Absolute 0.10 0.0 - 0.2 k/uL   Basic Metabolic Panel    Collection Time: 10/25/22 12:53 PM   Result Value Ref Range    Glucose 185 (H) 70 - 99 mg/dL    BUN 11 8 - 23 mg/dL    Creatinine 0.96 (H) 0.50 - 0.90 mg/dL    Est, Glom Filt Rate 60 (L) >60 mL/min/1.73m2    Calcium 7.4 (L) 8.6 - 10.4 mg/dL Sodium 133 (L) 135 - 144 mmol/L    Potassium 4.8 3.7 - 5.3 mmol/L    Chloride 99 98 - 107 mmol/L    CO2 26 20 - 31 mmol/L    Anion Gap 8 (L) 9 - 17 mmol/L   Protime-INR    Collection Time: 10/25/22 12:53 PM   Result Value Ref Range    Protime 28.6 (H) 11.8 - 14.6 sec    INR 2.7    APTT    Collection Time: 10/25/22 12:53 PM   Result Value Ref Range    PTT 35.1 24.0 - 36.0 sec   TSH with Reflex    Collection Time: 10/25/22 12:53 PM   Result Value Ref Range    TSH 37.09 (H) 0.30 - 5.00 uIU/mL   T4, Free    Collection Time: 10/25/22 12:53 PM   Result Value Ref Range    Thyroxine, Free 1.02 0.93 - 1.70 ng/dL   EKG 12 Lead    Collection Time: 10/25/22  5:12 PM   Result Value Ref Range    Ventricular Rate 72 BPM    Atrial Rate 72 BPM    P-R Interval 182 ms    QRS Duration 52 ms    Q-T Interval 444 ms    QTc Calculation (Bazett) 486 ms    P Axis 48 degrees    R Axis 31 degrees    T Axis 30 degrees       Assesment:     Primary Problem  Generalized weakness    Principal Problem:    Generalized weakness  Active Problems:    COPD (chronic obstructive pulmonary disease) (HCC)    Paroxysmal atrial fibrillation (HCC)    Iron deficiency anemia    Hypothyroidism    Closed fracture of nasal bone  Resolved Problems:    * No resolved hospital problems. *      Plan:      IV normal saline at 100 mL/hour    Patient has orthostatic hypotension,Jobst stockings bilateral knee-high  20-30 mmHg   Ferrous sulfate 325 mg p.o. daily   Check iron studies, folate, B12, FOBT   Check UA   Bilateral carotid Dopplers    EKG shows normal sinus rhythm. 2D echo 10/13/2022 from care reviewed shows an ejection fraction of 55% with no aortic stenosis   Consult cardiology   TSH elevated at 37.09, T4 1.02, increase Synthroid to 100 mcg p.o. daily   CT brain report, CT cervical report does not show any acute abnormality. CT facial shows acute nondisplaced nasal bone fracture bilaterally.    X-ray left elbow report, x-ray left hip report, x-ray right hip report x-ray left knee, x-ray right knee report does not show any acute abnormality   DVT prophylaxis on Coumadin INR therapeutic   Nutritional supplements 3 times a day   EPCs   check and replace electrolytes per sliding scale   restart home medications        Electronically signed by Austin Taylor MD     Copy sent to Dr. Elidia Soriano, APRN - CNP

## 2022-10-25 NOTE — CONSULTS
Date:   10/25/2022  Patient name: Willy Pemberton  Date of admission:  10/25/2022 12:32 PM  MRN:   [id number]  YOB: 1940  PCP: DANDRE Calvert CNP    Reason for Admission: Generalized weakness [R53.1]  Multiple contusions [T07. XXXA]  Frequent falls [R29.6]  Closed fracture of nasal bone, initial encounter [S02. 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  History of cholecystectomy    CT facial bones without contrast showed acute nondisplaced nasal bone fracture bilaterally  CT brain showed no acute intracranial abnormality  CT cervical spine showed no acute cervical spine fracture    2D echo 10/13/2022  Ejection fraction 55 to 60%  Trace aortic regurgitation mild to moderate mitral regurgitation  Right ventricular systolic pressure 30 mmHg    ECG 8/6/2022 normal sinus rhythm heart rate 77    Venous Doppler lower extremities 10/13/2022 no DVT      History of present illness  Luciana Munguia is a 42-year-old female who lives alone, normally independent in her activities of daily living, drives car , who came to the ER status post fall. Patient said she was walking and she tripped over her cane and she fell onto her knees and then her face and she sustained a cut into her eyebrows. No loss of consciousness. She did mention she has been having dizziness over the last couple of weeks. She is having persistent diarrhea she has lost about 30 pounds weight.   She had a total pancreatectomy in July 2022    On admission blood pressure was 120/50, heart rate 70 oxygen saturation 99%, temperature 37.1      Lab work on admission  INR 2. 7  WBC 6.0, hemoglobin 10.5, platelets 548, MCV 85  TSH 37.09, free thyroxine 1.02  Sodium 133, potassium 4.8, BUN 11, creatinine 0.96, glucose 185    Current evaluation  Patient seen and examined  Elderly frail looking female she was resting with couple of pillows  Did not appear in any significant distress  Bruising of the face noted  Bilateral lower extremities edema noted    ECG 10/25/2022:   Sinus rhythm 70, low voltage, artifacts    Medications:   Scheduled Meds:   levothyroxine  88 mcg Oral Daily    [START ON 10/26/2022] ferrous sulfate  325 mg Oral Daily with breakfast    sodium chloride flush  10 mL IntraVENous 2 times per day     Continuous Infusions:   sodium chloride      sodium chloride       CBC:   Recent Labs     10/25/22  1253   WBC 6.0   HGB 10.5*        BMP:    Recent Labs     10/25/22  1253   *   K 4.8   CL 99   CO2 26   BUN 11   CREATININE 0.96*   GLUCOSE 185*     Hepatic: No results for input(s): AST, ALT, ALB, BILITOT, ALKPHOS in the last 72 hours. Troponin: No results for input(s): TROPONINI in the last 72 hours. BNP: No results for input(s): BNP in the last 72 hours. Lipids: No results for input(s): CHOL, HDL in the last 72 hours. Invalid input(s): LDLCALCU  INR:   Recent Labs     10/25/22  1253   INR 2.7       Objective:   Vitals: /82   Pulse 74   Temp 98.7 °F (37.1 °C) (Oral)   Resp 20   SpO2 99%   General appearance: alert and cooperative with exam, frail looking female  HEENT: Facial bruising noted  Neck: supple, symmetrical, trachea midline mild venous distention  Lungs: diminished breath sounds bibasilar  Heart:  Cardiac apical impulse not palpable heart sounds distant  Abdomen:  Abdomen is soft surgical scar noted bowel sounds present  Extremities:  Bilateral edema noted  Neurologic: Mental status: Alert, oriented, thought content appropriate    EKG: normal sinus rhythm, low voltage artifacts. ECHO: reviewed.    Ejection fraction: 60%    Assessment / Acute Cardiac Problems:   Patient admitted status post fall no loss of consciousness  History of paroxysmal A. fib at present in sinus rhythm  Normal LV systolic function  Chronic diarrhea weight loss  Malnutrition  Status post total pancreatectomy    Patient Active Problem List:     Generalized weakness     COPD (chronic obstructive pulmonary disease) (HCC)     Paroxysmal atrial fibrillation (HCC)     Iron deficiency anemia      Plan of Treatment:   Medications reviewed  Continue current dose of levothyroxine  Continue home dose of Toprol-XL 50, continue home dose of warfarin  Pt need GI consult for pancreatic insufficiency, diarrhea  Pt problem is GI problem  Keep Pt well hydrated  Continue ECG monitoring    Patient  normally follow-up with Dr. Tessie Don    Thanks for consult    Electronically signed by Coby Milan MD on 10/25/2022 at 4:46 PM

## 2022-10-25 NOTE — ED PROVIDER NOTES
[address]  eMERGENCY dEPARTMENT eNCOUnter      Pt Name: Laura Navarro  MRN: [id number]  [place] 1940  Date of evaluation: 10/25/22      CHIEF COMPLAINT       Chief Complaint   Patient presents with    Fall     Hit head, no loc         HISTORY OF PRESENT ILLNESS    Laura Navarro is a [de-identified] y.o. female who presents complaining of fall. Patient states she had a mechanical fall where she was walking and tripped over her cane. Patient fell onto her knees and then her face. Patient states her glasses kind of cut into her eyebrow. Patient denies loss of consciousness. Patient states that she has been having dizziness for the last 2 weeks and there is been some medication adjustments made in that time period. Patient states that she was actually on her way to her doctor's office to talk to them about this issue. Patient's actually had multiple falls in the last couple weeks. Patient states most of her pain is in her face and also bilateral knees. No chest pain no back pain no neck pain. REVIEW OF SYSTEMS       Review of Systems   Constitutional:  Negative for activity change, appetite change, chills, diaphoresis and fever. HENT:  Negative for congestion, ear pain, facial swelling, nosebleeds, rhinorrhea, sinus pressure, sore throat and trouble swallowing. Eyes:  Negative for pain, discharge and redness. Respiratory:  Negative for cough, chest tightness, shortness of breath and wheezing. Cardiovascular:  Negative for chest pain, palpitations and leg swelling. Gastrointestinal:  Negative for abdominal pain, blood in stool, constipation, diarrhea, nausea and vomiting. Genitourinary:  Negative for difficulty urinating, dysuria, flank pain, frequency, genital sores and hematuria. Musculoskeletal:  Negative for arthralgias, back pain, gait problem, joint swelling, myalgias and neck pain.         Fall with injuries to the face and knees   Skin:  Negative for color change, pallor, rash and wound. Neurological:  Negative for dizziness, tremors, seizures, syncope, speech difficulty, weakness, numbness and headaches. Psychiatric/Behavioral:  Negative for confusion, decreased concentration, hallucinations, self-injury, sleep disturbance and suicidal ideas. PAST MEDICAL HISTORY     Past Medical History:   Diagnosis Date    Atrial fibrillation ([address]     Hypertension     Thyroid disease        SURGICAL HISTORY       Past Surgical History:   Procedure Laterality Date    CARPAL TUNNEL RELEASE Bilateral        CURRENT MEDICATIONS       Previous Medications    CLONAZEPAM (KLONOPIN) 1 MG TABLET    Take 1 mg by mouth 2 times daily as needed    LEVOTHYROXINE (SYNTHROID) 88 MCG TABLET    Take 88 mcg by mouth Daily    METOPROLOL (TOPROL-XL) 50 MG XL TABLET    Take 50 mg by mouth daily    WARFARIN (COUMADIN) 2.5 MG TABLET    Take 2.5 mg by mouth Indications: four days a week    WARFARIN (COUMADIN) 5 MG TABLET    Take 5 mg by mouth Indications: three days a week       ALLERGIES     is allergic to pcn [penicillins] and sulfa antibiotics. SOCIAL HISTORY      reports that she has never smoked. She does not have any smokeless tobacco history on file. She reports that she does not drink alcohol and does not use drugs. PHYSICAL EXAM     INITIAL VITALS: There were no vitals taken for this visit. Physical Exam  Vitals and nursing note reviewed. Constitutional:       General: She is not in acute distress. Appearance: She is well-developed. She is not diaphoretic. HENT:      Head: Normocephalic. Comments: Contusion to the right bridge of the nose with tenderness. Small abrasion to the right lateral eyebrow  Eyes:      General: No scleral icterus. Right eye: No discharge. Left eye: No discharge. Conjunctiva/sclera: Conjunctivae normal.      Pupils: Pupils are equal, round, and reactive to light. Cardiovascular:      Rate and Rhythm: Normal rate and regular rhythm. Heart sounds: Normal heart sounds. No murmur heard. No friction rub. No gallop. Pulmonary:      Effort: Pulmonary effort is normal. No respiratory distress. Breath sounds: Normal breath sounds. No wheezing or rales. Chest:      Chest wall: No tenderness. Abdominal:      General: Bowel sounds are normal. There is no distension. Palpations: Abdomen is soft. There is no mass. Tenderness: There is no abdominal tenderness. There is no guarding or rebound. Musculoskeletal:      Left elbow: No swelling, deformity, effusion or lacerations (Abrasion). Normal range of motion. Tenderness present in lateral epicondyle. Cervical back: Normal.      Thoracic back: Normal.      Lumbar back: Normal.      Right hip: Tenderness and bony tenderness present. No deformity, lacerations or crepitus. Decreased range of motion. Normal strength. Left hip: Tenderness and bony tenderness present. No deformity, lacerations or crepitus. Decreased range of motion. Normal strength. Right knee: Ecchymosis and bony tenderness present. No swelling, deformity, effusion or erythema. Decreased range of motion. Tenderness present over the medial joint line and lateral joint line. Left knee: Ecchymosis and bony tenderness present. No swelling, deformity, effusion or erythema. Decreased range of motion. Tenderness present over the medial joint line and lateral joint line. Right lower leg: Tenderness present. No lacerations. 2+ Edema present. Left lower leg: Tenderness present. No lacerations. 2+ Edema present. Skin:     General: Skin is warm and dry. Coloration: Skin is not pale. Findings: No erythema or rash. Neurological:      Mental Status: She is alert and oriented to person, place, and time. Cranial Nerves: No cranial nerve deficit. Sensory: No sensory deficit. Motor: No abnormal muscle tone.       Coordination: Coordination normal.      Deep Tendon Reflexes: Reflexes normal.   Psychiatric:         Behavior: Behavior normal.         Thought Content: Thought content normal.         Judgment: Judgment normal.       DIAGNOSTIC RESULTS     RADIOLOGY:All plain film, CT,MRI, and formal ultrasound images (except ED bedside ultrasound) are read by the radiologist and the interpretations are directly viewed by the emergency physician. XR ELBOW LEFT (MIN 3 VIEWS)    Result Date: 10/25/2022  EXAMINATION: THREE XRAY VIEWS OF THE LEFT ELBOW 10/25/2022 1:53 pm COMPARISON: None. HISTORY: ORDERING SYSTEM PROVIDED HISTORY: Pain TECHNOLOGIST PROVIDED HISTORY: Pain Reason for Exam: Pt. states pain in left elbow, both knees, both hips s/p fall 72-year-old female with left elbow pain after a fall FINDINGS: Osseous alignment is normal.  Mild degenerative changes of the ulnohumeral joint. Remaining joint spaces well maintained. No acute fracture or gross dislocation is seen. The radial head and radial neck appear intact. There is no significant elevation of the posterior fat pad or sail sign to suggest a joint effusion. 1. Mild degenerative changes of the ulnohumeral joint. 2. No acute fracture or dislocation. XR HIP LEFT (2-3 VIEWS)    Result Date: 10/25/2022  EXAMINATION: THREE XRAY VIEWS OF THE LEFT KNEE; TWO XRAY VIEWS OF THE LEFT HIP; THREE XRAY VIEWS OF THE RIGHT KNEE; TWO XRAY VIEWS OF THE RIGHT HIP 10/25/2022 1:53 pm COMPARISON: 03/08/2010, 08/26/2013 HISTORY: ORDERING SYSTEM PROVIDED HISTORY: Pain TECHNOLOGIST PROVIDED HISTORY: Pain Reason for Exam: Pt. states pain in left elbow, both knees, both hips s/p fall FINDINGS: Right hip: Mild degenerative changes of the right hip joint. Mild osteopenia. The visualized pelvic ring is grossly intact. No acute fracture or dislocation. Soft tissues are unremarkable. Left hip: Mild degenerative changes the left hip joint. Mild osteopenia. No acute fracture or dislocation. Soft tissues are otherwise unremarkable.  Right knee: Mild osteopenia. Mild tricompartmental degenerative changes. No acute fracture or dislocation. Patellar enthesophytes. No significant joint effusion. Soft tissues are otherwise unremarkable. Left knee: Osteopenia. No acute fracture or dislocation. Mild tricompartmental degenerative changes. No acute fracture or dislocation. Joint space and alignment otherwise maintained. No significant joint effusion. No acute osseous abnormality in the bilateral hips or knees. Given the degree of osteopenia, nondisplaced fractures may be radiographically occult. If pain or concern for fracture persists, consider CT or MR imaging. XR HIP RIGHT (2-3 VIEWS)    Result Date: 10/25/2022  EXAMINATION: THREE XRAY VIEWS OF THE LEFT KNEE; TWO XRAY VIEWS OF THE LEFT HIP; THREE XRAY VIEWS OF THE RIGHT KNEE; TWO XRAY VIEWS OF THE RIGHT HIP 10/25/2022 1:53 pm COMPARISON: 03/08/2010, 08/26/2013 HISTORY: ORDERING SYSTEM PROVIDED HISTORY: Pain TECHNOLOGIST PROVIDED HISTORY: Pain Reason for Exam: Pt. states pain in left elbow, both knees, both hips s/p fall FINDINGS: Right hip: Mild degenerative changes of the right hip joint. Mild osteopenia. The visualized pelvic ring is grossly intact. No acute fracture or dislocation. Soft tissues are unremarkable. Left hip: Mild degenerative changes the left hip joint. Mild osteopenia. No acute fracture or dislocation. Soft tissues are otherwise unremarkable. Right knee: Mild osteopenia. Mild tricompartmental degenerative changes. No acute fracture or dislocation. Patellar enthesophytes. No significant joint effusion. Soft tissues are otherwise unremarkable. Left knee: Osteopenia. No acute fracture or dislocation. Mild tricompartmental degenerative changes. No acute fracture or dislocation. Joint space and alignment otherwise maintained. No significant joint effusion. No acute osseous abnormality in the bilateral hips or knees.   Given the degree of osteopenia, nondisplaced fractures may be radiographically occult. If pain or concern for fracture persists, consider CT or MR imaging. XR KNEE LEFT (3 VIEWS)    Result Date: 10/25/2022  EXAMINATION: THREE XRAY VIEWS OF THE LEFT KNEE; TWO XRAY VIEWS OF THE LEFT HIP; THREE XRAY VIEWS OF THE RIGHT KNEE; TWO XRAY VIEWS OF THE RIGHT HIP 10/25/2022 1:53 pm COMPARISON: 03/08/2010, 08/26/2013 HISTORY: ORDERING SYSTEM PROVIDED HISTORY: Pain TECHNOLOGIST PROVIDED HISTORY: Pain Reason for Exam: Pt. states pain in left elbow, both knees, both hips s/p fall FINDINGS: Right hip: Mild degenerative changes of the right hip joint. Mild osteopenia. The visualized pelvic ring is grossly intact. No acute fracture or dislocation. Soft tissues are unremarkable. Left hip: Mild degenerative changes the left hip joint. Mild osteopenia. No acute fracture or dislocation. Soft tissues are otherwise unremarkable. Right knee: Mild osteopenia. Mild tricompartmental degenerative changes. No acute fracture or dislocation. Patellar enthesophytes. No significant joint effusion. Soft tissues are otherwise unremarkable. Left knee: Osteopenia. No acute fracture or dislocation. Mild tricompartmental degenerative changes. No acute fracture or dislocation. Joint space and alignment otherwise maintained. No significant joint effusion. No acute osseous abnormality in the bilateral hips or knees. Given the degree of osteopenia, nondisplaced fractures may be radiographically occult. If pain or concern for fracture persists, consider CT or MR imaging.      XR KNEE RIGHT (3 VIEWS)    Result Date: 10/25/2022  EXAMINATION: THREE XRAY VIEWS OF THE LEFT KNEE; TWO XRAY VIEWS OF THE LEFT HIP; THREE XRAY VIEWS OF THE RIGHT KNEE; TWO XRAY VIEWS OF THE RIGHT HIP 10/25/2022 1:53 pm COMPARISON: 03/08/2010, 08/26/2013 HISTORY: ORDERING SYSTEM PROVIDED HISTORY: Pain TECHNOLOGIST PROVIDED HISTORY: Pain Reason for Exam: Pt. states pain in left elbow, both knees, both hips s/p fall FINDINGS: Right hip: Mild degenerative changes of the right hip joint. Mild osteopenia. The visualized pelvic ring is grossly intact. No acute fracture or dislocation. Soft tissues are unremarkable. Left hip: Mild degenerative changes the left hip joint. Mild osteopenia. No acute fracture or dislocation. Soft tissues are otherwise unremarkable. Right knee: Mild osteopenia. Mild tricompartmental degenerative changes. No acute fracture or dislocation. Patellar enthesophytes. No significant joint effusion. Soft tissues are otherwise unremarkable. Left knee: Osteopenia. No acute fracture or dislocation. Mild tricompartmental degenerative changes. No acute fracture or dislocation. Joint space and alignment otherwise maintained. No significant joint effusion. No acute osseous abnormality in the bilateral hips or knees. Given the degree of osteopenia, nondisplaced fractures may be radiographically occult. If pain or concern for fracture persists, consider CT or MR imaging. CT HEAD WO CONTRAST    Result Date: 10/25/2022  EXAMINATION: CT OF THE HEAD WITHOUT CONTRAST  10/25/2022 1:17 pm TECHNIQUE: CT of the head was performed without the administration of intravenous contrast. Automated exposure control, iterative reconstruction, and/or weight based adjustment of the mA/kV was utilized to reduce the radiation dose to as low as reasonably achievable. COMPARISON: 09/13/2017. HISTORY: ORDERING SYSTEM PROVIDED HISTORY: Trauma TECHNOLOGIST PROVIDED HISTORY: Trauma Decision Support Exception - unselect if not a suspected or confirmed emergency medical condition->Emergency Medical Condition (MA) Reason for Exam: Trauma Additional signs and symptoms: Pt c/o fall on face today. Bruising on dose and right side by eye FINDINGS: BRAIN/VENTRICLES: There is no acute intracranial hemorrhage, mass effect or midline shift. No abnormal extra-axial fluid collection.   The gray-white differentiation is maintained without evidence of an acute infarct. There is no evidence of hydrocephalus. Few nonspecific white matter changes bilaterally likely representing mild chronic microangiopathy. Mild, diffuse parenchymal volume loss. ORBITS: The visualized portion of the orbits demonstrate no acute abnormality. SINUSES: The visualized paranasal sinuses and mastoid air cells demonstrate no acute abnormality. SOFT TISSUES/SKULL:  No acute abnormality of the visualized skull or soft tissues. No acute intracranial abnormality identified by CT. CT FACIAL BONES WO CONTRAST    Result Date: 10/25/2022  EXAMINATION: CT OF THE FACE WITHOUT CONTRAST  10/25/2022 1:17 pm TECHNIQUE: CT of the face was performed without the administration of intravenous contrast. Multiplanar reformatted images are provided for review. Automated exposure control, iterative reconstruction, and/or weight based adjustment of the mA/kV was utilized to reduce the radiation dose to as low as reasonably achievable. COMPARISON: None HISTORY: ORDERING SYSTEM PROVIDED HISTORY: Fall, pain TECHNOLOGIST PROVIDED HISTORY: Fall, pain Decision Support Exception - unselect if not a suspected or confirmed emergency medical condition->Emergency Medical Condition (MA) Reason for Exam: Fall, pain Additional signs and symptoms: Fall today on face, bruising to nose and mainly right side FINDINGS: FACIAL BONES: The frontal sinuses, orbital walls, maxilla, pterygoid plates, zygomatic arches, hard palate, and mandible are intact. Acute nondisplaced anterior nasal bone fractures bilaterally with overlying soft tissue swelling. The temporomandibular joints are aligned. ORBITAL CONTENTS: The globes appear intact. The extraocular muscles, optic nerve sheath complexes and lacrimal glands appear unremarkable. No retrobulbar hematoma or mass is seen. SINUSES: Mild right maxillary sinus mucosal thickening. The mastoid air cells are clear. SOFT TISSUES: Mild nasal soft tissue swelling. Acute nondisplaced nasal bone fractures bilaterally. CT CERVICAL SPINE WO CONTRAST    Result Date: 10/25/2022  EXAMINATION: CT OF THE CERVICAL SPINE WITHOUT CONTRAST 10/25/2022 1:17 pm TECHNIQUE: CT of the cervical spine was performed without the administration of intravenous contrast. Multiplanar reformatted images are provided for review. Automated exposure control, iterative reconstruction, and/or weight based adjustment of the mA/kV was utilized to reduce the radiation dose to as low as reasonably achievable. COMPARISON: None. HISTORY: ORDERING SYSTEM PROVIDED HISTORY: Fall TECHNOLOGIST PROVIDED HISTORY: Fall Decision Support Exception - unselect if not a suspected or confirmed emergency medical condition->Emergency Medical Condition (MA) Reason for Exam: Fall Additional signs and symptoms: Pt fell on face today, facial trauma and bruising FINDINGS: BONES/ALIGNMENT: There is no acute fracture or traumatic malalignment. Minimal degenerative retrolisthesis of C5 and C6 measuring 0.2 cm. DEGENERATIVE CHANGES: Moderate multilevel disc disease. SOFT TISSUES: There is no prevertebral soft tissue swelling. No acute cervical spine fracture. LABS: All lab results were reviewed by myself, and all abnormals are listed below.   Labs Reviewed   CBC WITH AUTO DIFFERENTIAL - Abnormal; Notable for the following components:       Result Value    RBC 3.79 (*)     Hemoglobin 10.5 (*)     Hematocrit 32.0 (*)     RDW 16.3 (*)     Monocytes 11 (*)     All other components within normal limits   BASIC METABOLIC PANEL - Abnormal; Notable for the following components:    Glucose 185 (*)     Creatinine 0.96 (*)     Est, Glom Filt Rate 60 (*)     Calcium 7.4 (*)     Sodium 133 (*)     Anion Gap 8 (*)     All other components within normal limits   PROTIME-INR - Abnormal; Notable for the following components:    Protime 28.6 (*)     All other components within normal limits   APTT   URINALYSIS WITH REFLEX TO CULTURE MEDICAL DECISION MAKING:     To some basic labs give her some fluids since EMS stated that she was orthostatic. We will also get multiple CTs and x-rays due to her age being on blood thinners and where her pain is. Due to the multiple falls most likely will need to bring her in to have her medications looked at. EMERGENCY DEPARTMENT COURSE:   Vitals: There were no vitals filed for this visit. The patient was given the following medications while in the emergency department:  Orders Placed This Encounter   Medications    0.9 % sodium chloride bolus       -------------------------  2:59 PM EDT  Patient was updated on results. Patient's traumatic injuries do not require admission. Patient has had multiple falls and we think this is probably related to some orthostatic hypotension related to medication adjustments. I spoke with Dr. Vaughn Swan who is the PCP who is going to admit the patient for medication changes. 3:26 PM EDT  Discussed the case with trauma surgeon Dr. Nabeel Perez and he will be on consult but states he does not need admit this patient since they are not being admitted for traumatic reason. CONSULTS:  None    PROCEDURES:  None    FINAL IMPRESSION      1. Frequent falls    2. Closed fracture of nasal bone, initial encounter    3. Multiple contusions          DISPOSITION/PLAN   DISPOSITION Decision To Admit 10/25/2022 02:58:21 PM      PATIENT REFERREDTO:  No follow-up provider specified.     DISCHARGEMEDICATIONS:  New Prescriptions    No medications on file       (Please note that portions of this note were completed with a voice recognition program.  Efforts were made to edit thedictations but occasionally words are mis-transcribed.)    Marie Samuels MD  Attending Emergency Physician                        Marie Samuels MD  10/25/22 [id number]

## 2022-10-25 NOTE — PROGRESS NOTES
Pharmacy Note  Warfarin Consult    Acosta Giles is a [de-identified] y.o. female for whom pharmacy has been consulted to manage warfarin therapy. Consulting Physician: Dr. Breanne Hanson   Reason for Admission: weakness     Warfarin dose prior to admission: 1.25 mg daily   Warfarin indication: atrial fibrillation   Target INR range: 2-3     Past Medical History:   Diagnosis Date    Atrial fibrillation ([address]     Hypertension     Thyroid disease      Recent Labs     10/25/22  1253   INR 2.7     Recent Labs     10/25/22  1253   HGB 10.5*   HCT 32.0*        Current warfarin drug-drug interactions: synthroid     Date             INR        Dose   10/25/2022            2.7 (goal 2-3)       1.25 mg     Will give home dose of 1.25 mg this evening. Daily PT/INR while inpatient. Thank you for the consult. Will continue to follow.     Aurea Yen PharmD, BCPS  10/25/2022 5:11 PM Letter Template: 51 Include Body Diagram: no Save Copy Of Letter In Chart When Sent: yes

## 2022-10-26 ENCOUNTER — APPOINTMENT (OUTPATIENT)
Dept: VASCULAR LAB | Age: 82
DRG: 439 | End: 2022-10-26
Payer: COMMERCIAL

## 2022-10-26 PROBLEM — K86.81 EXOCRINE PANCREATIC INSUFFICIENCY: Status: ACTIVE | Noted: 2022-10-26

## 2022-10-26 PROBLEM — R79.89 ELEVATED LFTS: Status: ACTIVE | Noted: 2022-10-26

## 2022-10-26 PROBLEM — E11.9 DIABETES MELLITUS (HCC): Status: ACTIVE | Noted: 2022-10-26

## 2022-10-26 LAB
ABSOLUTE EOS #: 0.1 K/UL (ref 0–0.4)
ABSOLUTE LYMPH #: 2.6 K/UL (ref 1–4.8)
ABSOLUTE MONO #: 0.8 K/UL (ref 0.1–1.3)
ALBUMIN SERPL-MCNC: 2.3 G/DL (ref 3.5–5.2)
ALP BLD-CCNC: 194 U/L (ref 35–104)
ALT SERPL-CCNC: 73 U/L (ref 5–33)
ANION GAP SERPL CALCULATED.3IONS-SCNC: 8 MMOL/L (ref 9–17)
AST SERPL-CCNC: 74 U/L
BASOPHILS # BLD: 1 % (ref 0–2)
BASOPHILS ABSOLUTE: 0.1 K/UL (ref 0–0.2)
BILIRUB SERPL-MCNC: 0.4 MG/DL (ref 0.3–1.2)
BUN BLDV-MCNC: 10 MG/DL (ref 8–23)
CALCIUM SERPL-MCNC: 7 MG/DL (ref 8.6–10.4)
CHLORIDE BLD-SCNC: 100 MMOL/L (ref 98–107)
CO2: 27 MMOL/L (ref 20–31)
CREAT SERPL-MCNC: 0.86 MG/DL (ref 0.5–0.9)
EKG ATRIAL RATE: 72 BPM
EKG P AXIS: 48 DEGREES
EKG P-R INTERVAL: 182 MS
EKG Q-T INTERVAL: 444 MS
EKG QRS DURATION: 52 MS
EKG QTC CALCULATION (BAZETT): 486 MS
EKG R AXIS: 31 DEGREES
EKG T AXIS: 30 DEGREES
EKG VENTRICULAR RATE: 72 BPM
EOSINOPHILS RELATIVE PERCENT: 1 % (ref 0–4)
FOLATE: 18.9 NG/ML
GFR SERPL CREATININE-BSD FRML MDRD: >60 ML/MIN/1.73M2
GLUCOSE BLD-MCNC: 137 MG/DL (ref 70–99)
GLUCOSE BLD-MCNC: 185 MG/DL (ref 65–105)
GLUCOSE BLD-MCNC: 234 MG/DL (ref 65–105)
GLUCOSE BLD-MCNC: 356 MG/DL (ref 65–105)
HAV IGM SER IA-ACNC: NONREACTIVE
HCT VFR BLD CALC: 32.6 % (ref 36–46)
HEMOGLOBIN: 10.7 G/DL (ref 12–16)
HEPATITIS B CORE IGM ANTIBODY: NONREACTIVE
HEPATITIS B SURFACE ANTIGEN: NONREACTIVE
HEPATITIS C ANTIBODY: NONREACTIVE
INR BLD: 2.6
IRON SATURATION: 77 % (ref 20–55)
IRON: 87 UG/DL (ref 37–145)
LYMPHOCYTES # BLD: 33 % (ref 24–44)
MCH RBC QN AUTO: 28 PG (ref 26–34)
MCHC RBC AUTO-ENTMCNC: 33 G/DL (ref 31–37)
MCV RBC AUTO: 84.9 FL (ref 80–100)
MONOCYTES # BLD: 10 % (ref 1–7)
PDW BLD-RTO: 16.4 % (ref 11.5–14.9)
PLATELET # BLD: 286 K/UL (ref 150–450)
PMV BLD AUTO: 7.6 FL (ref 6–12)
POTASSIUM SERPL-SCNC: 3.9 MMOL/L (ref 3.7–5.3)
PROTHROMBIN TIME: 27.9 SEC (ref 11.8–14.6)
RBC # BLD: 3.84 M/UL (ref 4–5.2)
SEG NEUTROPHILS: 55 % (ref 36–66)
SEGMENTED NEUTROPHILS ABSOLUTE COUNT: 4.2 K/UL (ref 1.3–9.1)
SODIUM BLD-SCNC: 135 MMOL/L (ref 135–144)
TOTAL IRON BINDING CAPACITY: 113 UG/DL (ref 250–450)
TOTAL PROTEIN: 4.9 G/DL (ref 6.4–8.3)
UNSATURATED IRON BINDING CAPACITY: 26 UG/DL (ref 112–347)
VITAMIN B-12: 1087 PG/ML (ref 232–1245)
WBC # BLD: 7.8 K/UL (ref 3.5–11)

## 2022-10-26 PROCEDURE — 82947 ASSAY GLUCOSE BLOOD QUANT: CPT

## 2022-10-26 PROCEDURE — 93880 EXTRACRANIAL BILAT STUDY: CPT

## 2022-10-26 PROCEDURE — 6360000002 HC RX W HCPCS: Performed by: INTERNAL MEDICINE

## 2022-10-26 PROCEDURE — 97535 SELF CARE MNGMENT TRAINING: CPT

## 2022-10-26 PROCEDURE — 80074 ACUTE HEPATITIS PANEL: CPT

## 2022-10-26 PROCEDURE — 82746 ASSAY OF FOLIC ACID SERUM: CPT

## 2022-10-26 PROCEDURE — 6370000000 HC RX 637 (ALT 250 FOR IP): Performed by: FAMILY MEDICINE

## 2022-10-26 PROCEDURE — 85025 COMPLETE CBC W/AUTO DIFF WBC: CPT

## 2022-10-26 PROCEDURE — 97162 PT EVAL MOD COMPLEX 30 MIN: CPT

## 2022-10-26 PROCEDURE — 82607 VITAMIN B-12: CPT

## 2022-10-26 PROCEDURE — 36415 COLL VENOUS BLD VENIPUNCTURE: CPT

## 2022-10-26 PROCEDURE — 2060000000 HC ICU INTERMEDIATE R&B

## 2022-10-26 PROCEDURE — 85610 PROTHROMBIN TIME: CPT

## 2022-10-26 PROCEDURE — 6370000000 HC RX 637 (ALT 250 FOR IP): Performed by: NURSE PRACTITIONER

## 2022-10-26 PROCEDURE — 2580000003 HC RX 258: Performed by: FAMILY MEDICINE

## 2022-10-26 PROCEDURE — P9047 ALBUMIN (HUMAN), 25%, 50ML: HCPCS | Performed by: INTERNAL MEDICINE

## 2022-10-26 PROCEDURE — 97116 GAIT TRAINING THERAPY: CPT

## 2022-10-26 PROCEDURE — 97166 OT EVAL MOD COMPLEX 45 MIN: CPT

## 2022-10-26 PROCEDURE — 6370000000 HC RX 637 (ALT 250 FOR IP)

## 2022-10-26 PROCEDURE — 93010 ELECTROCARDIOGRAM REPORT: CPT | Performed by: INTERNAL MEDICINE

## 2022-10-26 PROCEDURE — 80053 COMPREHEN METABOLIC PANEL: CPT

## 2022-10-26 RX ORDER — ALBUMIN (HUMAN) 12.5 G/50ML
25 SOLUTION INTRAVENOUS DAILY
Status: COMPLETED | OUTPATIENT
Start: 2022-10-26 | End: 2022-10-27

## 2022-10-26 RX ORDER — WARFARIN SODIUM 2.5 MG/1
1.25 TABLET ORAL
Status: COMPLETED | OUTPATIENT
Start: 2022-10-26 | End: 2022-10-26

## 2022-10-26 RX ORDER — CLONAZEPAM 0.5 MG/1
0.5 TABLET ORAL DAILY PRN
Status: DISCONTINUED | OUTPATIENT
Start: 2022-10-26 | End: 2022-11-01 | Stop reason: HOSPADM

## 2022-10-26 RX ADMIN — CLONAZEPAM 0.5 MG: 0.5 TABLET ORAL at 20:19

## 2022-10-26 RX ADMIN — WARFARIN SODIUM 1.25 MG: 2.5 TABLET ORAL at 17:56

## 2022-10-26 RX ADMIN — PANCRELIPASE LIPASE, PANCRELIPASE PROTEASE, PANCRELIPASE AMYLASE 5000 UNITS: 5000; 17000; 24000 CAPSULE, DELAYED RELEASE ORAL at 17:56

## 2022-10-26 RX ADMIN — INSULIN LISPRO 3 UNITS: 100 INJECTION, SOLUTION INTRAVENOUS; SUBCUTANEOUS at 17:58

## 2022-10-26 RX ADMIN — SODIUM CHLORIDE: 9 INJECTION, SOLUTION INTRAVENOUS at 01:50

## 2022-10-26 RX ADMIN — PANCRELIPASE LIPASE, PANCRELIPASE PROTEASE, PANCRELIPASE AMYLASE 5000 UNITS: 5000; 17000; 24000 CAPSULE, DELAYED RELEASE ORAL at 15:07

## 2022-10-26 RX ADMIN — FERROUS SULFATE TAB 325 MG (65 MG ELEMENTAL FE) 325 MG: 325 (65 FE) TAB at 09:14

## 2022-10-26 RX ADMIN — SODIUM CHLORIDE: 9 INJECTION, SOLUTION INTRAVENOUS at 15:10

## 2022-10-26 RX ADMIN — INSULIN LISPRO 1 UNITS: 100 INJECTION, SOLUTION INTRAVENOUS; SUBCUTANEOUS at 13:19

## 2022-10-26 RX ADMIN — ALBUMIN (HUMAN) 25 G: 0.25 INJECTION, SOLUTION INTRAVENOUS at 18:01

## 2022-10-26 RX ADMIN — LEVOTHYROXINE SODIUM 100 MCG: 0.1 TABLET ORAL at 05:23

## 2022-10-26 ASSESSMENT — ENCOUNTER SYMPTOMS
COUGH: 0
VOMITING: 0
BLOOD IN STOOL: 0
DIARRHEA: 1
COLOR CHANGE: 0
CHEST TIGHTNESS: 0
SHORTNESS OF BREATH: 0
ABDOMINAL PAIN: 0
TROUBLE SWALLOWING: 0
NAUSEA: 0
EYE ITCHING: 0
EYE REDNESS: 0

## 2022-10-26 ASSESSMENT — PAIN SCALES - GENERAL: PAINLEVEL_OUTOF10: 3

## 2022-10-26 ASSESSMENT — PAIN DESCRIPTION - LOCATION: LOCATION: ARM

## 2022-10-26 ASSESSMENT — PAIN DESCRIPTION - PAIN TYPE: TYPE: ACUTE PAIN

## 2022-10-26 ASSESSMENT — PAIN DESCRIPTION - DESCRIPTORS: DESCRIPTORS: BURNING;SORE

## 2022-10-26 ASSESSMENT — PAIN DESCRIPTION - ORIENTATION: ORIENTATION: RIGHT;LOWER

## 2022-10-26 NOTE — CARE COORDINATION
CASE MANAGEMENT NOTE:    Admission Date:  10/25/2022 Catalina Berger is a [de-identified] y.o.  female    Admitted for : Generalized weakness [R53.1]  Multiple contusions [T07. XXXA]  Frequent falls [R29.6]  Closed fracture of nasal bone, initial encounter [S02. 2XXA]    Met with:  Patient    PCP:  Chanell Staley                                 Insurance:  [employer]      Is patient alert and oriented at time of discussion:  Yes    Current Residence/ Living Arrangements:  independently at home             Current Services PTA:  No    Does patient go to outpatient dialysis: No  If yes, location and chair time:   Who is their nephrologist?     Is patient agreeable to VNS: No    Freedom of choice provided:  No    List of [address] provided: NA    VNS chosen:  No    DME:  straight cane and walker    Home Oxygen: No    Nebulizer: No    CPAP/BIPAP: No    Supplier: N/A    Potential Assistance Needed: No    SNF needed: No    Freedom of choice and list provided: NA    Pharmacy:  [address] [facility]       Is patient currently receiving oral anticoagulation therapy? No    Is the Patient an [facility] with Readmission Risk Score greater than 14%? No  If yes, pt needs a follow up appointment made within 7 days. Family Members/Caregivers that pt would like involved in their care:    Yes    If yes, list name here:  amadeo    Transportation Provider:  Family             Discharge Plan:  10/26/22 [employer] Pt is from home in a two story home alone DME walker and cane VNS pt deines need Plan is to discharge to home with no needs will continue to follow for needs IMM done 10/26/22 @ [id number] .//tv                 Electronically signed by:  John Paul Salgado RN on 10/26/2022 at 11:45 AM

## 2022-10-26 NOTE — PROGRESS NOTES
Date:   10/26/2022  Patient name: Juliet Avilez  Date of admission:  10/25/2022 12:32 PM  MRN:   [id number]  YOB: 1940  PCP: DANDRE Kirk CNP    Reason for Admission: Generalized weakness [R53.1]  Multiple contusions [T07. XXXA]  Frequent falls [R29.6]  Closed fracture of nasal bone, initial encounter [S02. 2XXA]    Cardiology follow-up: History of paroxysmal A. 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  History of cholecystectomy     CT facial bones without contrast showed acute nondisplaced nasal bone fracture bilaterally  CT brain showed no acute intracranial abnormality  CT cervical spine showed no acute cervical spine fracture     2D echo 10/13/2022  Ejection fraction 55 to 60%  Trace aortic regurgitation mild to moderate mitral regurgitation  Right ventricular systolic pressure 30 mmHg     ECG 8/6/2022 normal sinus rhythm heart rate 77     Venous Doppler lower extremities 10/13/2022 no DVT        History of present illness  Ernesto Sam is a 47-year-old female who lives alone, normally independent in her activities of daily living, drives car , who came to the ER status post fall. Patient said she was walking and she tripped over her cane and she fell onto her knees and then her face and she sustained a cut into her eyebrows. No loss of consciousness. She did mention she has been having dizziness over the last couple of weeks. She is having persistent diarrhea she has lost about 30 pounds weight.   She had a total pancreatectomy in July 2022     On admission blood pressure was 120/50, heart rate 70 oxygen saturation 99%, temperature 37.1    Lab work on admission  INR 2.7  WBC 6.0, hemoglobin 10.5, platelets 729, MCV 85  TSH 37.09, free thyroxine 1.02  Sodium 133, potassium 4.8, BUN 11, creatinine 0.96, glucose 185, albumin 2.3    Current evaluation  Patient seen and examined, discussed with the patient's nurse  He continues to have diarrhea  He is able to walk without help with the walker  When she came out of the bathroom she said she is feeling dizzy blood sugar was 336  Pulse was regular at about 60 bpm, blood pressure stable    Albumin 2.3, alkaline phosphatase 194, ALT 73, AST 74, total protein 4.9  Hemoglobin 10.7, potassium 3.9, creatinine 0.86,     Medications:   Scheduled Meds:   warfarin  1.25 mg Oral Once    lipase-protease-amylase  5,000 Units Oral TID WC    albumin human  25 g IntraVENous Daily    ferrous sulfate  325 mg Oral Daily with breakfast    sodium chloride flush  10 mL IntraVENous 2 times per day    warfarin placeholder: dosing by pharmacy   Other RX Placeholder    levothyroxine  100 mcg Oral Daily    insulin lispro  0-4 Units SubCUTAneous TID WC    insulin lispro  0-4 Units SubCUTAneous Nightly     Continuous Infusions:   sodium chloride      dextrose       CBC:   Recent Labs     10/25/22  1253 10/26/22  0544   WBC 6.0 7.8   HGB 10.5* 10.7*    286     BMP:    Recent Labs     10/25/22  1253 10/26/22  0544   * 135   K 4.8 3.9   CL 99 100   CO2 26 27   BUN 11 10   CREATININE 0.96* 0.86   GLUCOSE 185* 137*     Hepatic:   Recent Labs     10/26/22  0544   AST 74*   ALT 73*   BILITOT 0.4   ALKPHOS 194*     Troponin: No results for input(s): TROPONINI in the last 72 hours. BNP: No results for input(s): BNP in the last 72 hours. Lipids: No results for input(s): CHOL, HDL in the last 72 hours.     Invalid input(s): LDLCALCU  INR:   Recent Labs     10/25/22  1253 10/26/22  0544   INR 2.7 2.6 Objective:   Vitals: BP (!) 108/58   Pulse 78   Temp 97.8 °F (36.6 °C) (Oral)   Resp 18   Ht 5' (1.524 m)   Wt 106 lb 0.7 oz (48.1 kg)   SpO2 99%   BMI 20.71 kg/m²   General appearance: Frail looking female  HEENT: Head: Normal, facial bruising noted  Neck: supple, symmetrical, trachea midline  Lungs: diminished breath sounds LLL  Heart: regular rate and rhythm  Abdomen:  Soft bowel sounds present  Extremities:  Bilateral edema noted  Neurologic: Mental status: Alert, oriented, thought content appropriate    EKG: normal sinus rhythm, low voltage artifacts. ECHO: reviewed.    Ejection fraction: 60%    Assessment / Acute Cardiac Problems:   Patient admitted status post fall no loss of consciousness  History of paroxysmal A. fib at present in sinus rhythm  Normal LV systolic function  Peripheral edema most likely combination of hypoproteinemia and anemia  Chronic diarrhea weight loss  Malnutrition  Status post total pancreatectomy    Patient Active Problem List:     Generalized weakness     COPD (chronic obstructive pulmonary disease) (HCC)     Paroxysmal atrial fibrillation (HCC)     Iron deficiency anemia     Hypothyroidism     Closed fracture of nasal bone     Elevated LFTs     Diabetes mellitus ([address]     Exocrine pancreatic insufficiency      Plan of Treatment:   Medications reviewed  Start albumin 25 g daily for 2 days  Continue current dose of warfarin, diuretics  Patient is also on Pancrelipase  Keep patient well-hydrated  Continue ECG monitoring  \Continue blood sugar monitoring    Electronically signed by Beryl Vega MD on 10/26/2022 at 4:48 PM

## 2022-10-26 NOTE — PROGRESS NOTES
Patient was seen and examined. Awake alert in no acute distress. Afebrile vital signs are stable. Facial bruising stable. No active bleeding. Lungs clear. Abdomen is soft nondistended nontender. Blood work reviewed. BMP unremarkable. WBC count normal.  Hemoglobin stable. Continue medical management. Ice pack as needed. No acute general surgical issues at this time. Diet as tolerated.

## 2022-10-26 NOTE — PROGRESS NOTES
[address]   Occupational Therapy Evaluation  Date: 10/26/22  Patient Name: Bev Cruz       Room:   MRN: [id number]  Account: [de-identified]   : 1940  ([de-identified] y.o.) Gender: female     Discharge Recommendations:  Further Occupational Therapy is recommended upon facility discharge. Referring Practitioner: Dayday Smith MD  Diagnosis: Generalized weakness, multiple contusions, frequent falls, closed fracture of nasal bone      Treatment Diagnosis: Impaired self-care status. Past Medical History:  has a past medical history of Atrial fibrillation ([address] Hypertension, and Thyroid disease. Past Surgical History:   has a past surgical history that includes Carpal tunnel release (Bilateral). Restrictions  Restrictions/Precautions  Restrictions/Precautions: Fall Risk;Up as Tolerated (peripheral IV right antecubital)  Required Braces or Orthoses?: No      Vitals  Vitals  Heart Rate: 78  Heart Rate Source: Monitor  BP: (!) 108/58  BP Location: Right upper arm  BP Method: Automatic  MAP (Calculated): 74.67  Resp: 18  SpO2: 99 %  O2 Device: None (Room air)     Subjective  Subjective: \"I've had 3 falls in the last 10 days\" patient states she has had a number of recent falls.   Subjective  Pain: denies      Social/Functional History  Social/Functional History  Lives With: Alone  Type of Home: House  Home Layout: Bed/Bath upstairs, Two level, Laundry in basement  Home Access: Stairs to enter without rails  Entrance Stairs - Number of Steps: 2 wo rail to enter, / 14 steps w/ right rail to 2nd floor bedroom, 9 steps w/ right rail to descend to basement laundry  Entrance Stairs - Rails: None  Bathroom Shower/Tub: Walk-in shower (uses walkin shower on first floor, has tub shower on second floor)  Bathroom Toilet: Standard (w/ grab bars)  Bathroom Equipment: Grab bars in shower, Grab bars around toilet  Home Equipment: [employer], rolling (has 2 walkers and keeps one on each level of the house)  Has the patient had two or more falls in the past year or any fall with injury in the past year?:  (3 falls in 10 days- tripped over cane while descending 2 steps on last fall)  ADL Assistance: [address]: Independent  Homemaking Responsibilities: Yes  Ambulation Assistance: Independent (no device, uses s cane outside the home, hx multiple falls in the last 2 weeks)  Transfer Assistance: Independent  Active : Yes  Mode of Transportation: Car  Occupation: Retired  IADL Comments: sleeps in a flat bed  Additional Comments: states that she has no one to call to assist if needed      Objective  Cognition  Orientation  Overall Orientation Status: Within Functional Limits  Orientation Level: Oriented X4, Oriented to place, Oriented to time, Oriented to situation, Oriented to person      Sensation  Overall Sensation Status:  (reports tingling in B hands)    Activities of Daily Living  ADL  Feeding: Setup  Grooming: Contact guard assistance  Grooming Skilled Clinical Factors: Standing at sink to wash hands  UE Bathing: Stand by assistance  LE Bathing: Contact guard assistance  UE Dressing: Stand by assistance  LE Dressing: Contact guard assistance  Toileting: Contact guard assistance  Additional Comments: ADL scores based on skilled observations and clinical reasoning unless otherwise noted. Patient doffed/donned bilateral socks while seated on EOB with SBA. Patient engaged in toileting tasks with CGA to pull underwear up/down. Patient reports recent falls at home.          UE Function  LUE AROM (degrees)  LUE AROM : WFL  Left Hand AROM (degrees)  Left Hand AROM: WFL  Tone LUE  LUE Tone: Normotonic  LUE Strength  Gross LUE Strength: WFL  L Hand General: 4-/5  LUE Strength Comment: Grossly 4-/5    RUE AROM (degrees)  RUE AROM : WFL  Right Hand AROM (degrees)  Right Hand AROM: WFL  Tone RUE  RUE Tone: Normotonic  RUE Strength  Gross RUE Strength: WFL  R Hand General: 4-/5  RUE Strength Comment: Grossly 4-/5         Fine Motor Skills/Coordination  Coordination  Movements Are Fluid And Coordinated: Yes                Mobility  Bed Mobility  Bed mobility  Rolling to Left: Stand by assistance  Supine to Sit: Stand by assistance    Balance  Balance  Sitting Balance: Stand by assistance  Standing Balance: Contact guard assistance       Transfers  Transfers  Sit to stand: Contact guard assistance  Stand to sit: Contact guard assistance  Transfer Comments: with Minimal verbal cues for hand placement and safety  Toilet Transfers  Toilet - Technique: Ambulating  Equipment Used: Grab bars  Toilet Transfer: Contact guard assistance  Toilet Transfers Comments: with RW    Functional Mobility  Functional - Mobility Device: Rolling Walker  Activity: To/from bathroom, Other (to/from doorway and back)  Assist Level: Contact guard assistance  Functional Mobility Comments: with Minimal verbal cues for safety    Assessment  Assessment  Performance deficits / Impairments: Decreased functional mobility , Decreased ADL status, Decreased strength, Decreased safe awareness, Decreased endurance, Decreased balance, Decreased high-level IADLs, Decreased fine motor control  Treatment Diagnosis: Impaired self-care status.   Prognosis: Good  Decision Making: Medium Complexity  Discharge Recommendations: Patient would benefit from continued therapy after discharge    Activity Tolerance  Activity Tolerance: Patient Tolerated treatment well    Safety Devices  Type of Devices: Patient at risk for falls, All fall risk precautions in place, Left in chair, Call light within reach, Chair alarm in place, Nurse notified, Gait belt (nurse Tonia Hurtado)    Patient Education  Patient Education  Education Given To: Patient  Education Provided: Role of Therapy, Plan of Care, Precautions, ADL Adaptive Strategies, Transfer Training  Education Method: Demonstration, Verbal  Barriers to Learning: None      Functional Outcome Measures  AM-PAC Daily Activity Inpatient   How much help for putting on and taking off regular lower body clothing?: A Little  How much help for Bathing?: A Little  How much help for Toileting?: A Little  How much help for putting on and taking off regular upper body clothing?: A Little  How much help for taking care of personal grooming?: A Little  How much help for eating meals?: None  AM-PAC Inpatient Daily Activity Raw Score: 19  AM-PAC Inpatient ADL T-Scale Score : 40.22  ADL Inpatient CMS 0-100% Score: 42.8  ADL Inpatient CMS G-Code Modifier : CK       Goals  Patient Goals   Patient goals : To return home  Short Term Goals  Time Frame for Short Term Goals: By discharge  Short Term Goal 1: Patient will verbalize/demonstrate Good understanding of Fall Prevention Strategies to promote increased IND with self-care and mobility. Short Term Goal 2: Patient will verbalize/demonstrate Good understanding of assistive equipment/durable medical equipment/modified techniques for increased IND with self-care and mobility. Short Term Goal 3: Patient will perform BADLs with SBA and Good safety. Short Term Goal 4: Patient will perform functional mobility and transfers during self-care with SBA, RW, and Good safety. Short Term Goal 5: Patient will actively participate in 15-25 minutes of therapeutic exercise/functional activities to promote increased IND with self-care and mobility. Plan  Occupational Therapy Plan  Times Per Week: 4-6  Times Per Day:  Once a day  Current Treatment Recommendations: Self-Care / ADL, Home management training, Strengthening, Balance training, Functional mobility training, Endurance training, Safety education & training, Patient/Caregiver education & training, Equipment evaluation, education, & procurement      OT Individual Minutes  OT Individual Minutes  Time In: 1108  Time Out: [address] [place]  Minutes: 39  Time Code Minutes   Timed Code Treatment Minutes: 24 Minutes        Electronically signed by Steven Zamudio OTR/L on 10/26/22 at 12:47 PM EDT

## 2022-10-26 NOTE — CARE COORDINATION
[facility] [address] Encounter Date/Time: 10/25/2022 William Account: [de-identified]    MRN: [id number]    Patient: [address] Serial #: [id number]      ENCOUNTER          Patient Class: I Private Enc? No Unit RM BD: [facility] PROG 8-   Hospital Service: MED   Encounter DX: Generalized weakness [L4*   ADM Provider: Dat Rivera MD   Procedure:     ATT Provider: Dat Rivera MD   REF Provider:        Admission DX: Generalized weakness, Multiple contusions, Frequent falls, Closed fracture of nasal bone, initial encounter and DX codes: R53.1, T07. XXXA, R29.6, S02. 2XXA      PATIENT                 Name: Sho Ibarra : 1940 ([de-identified] yrs)   Address: [address] [place] [address] Sex: Female   [place]: [place] [address]         Marital Status:    Employer: RETIRED         Zoroastrianism: Congregational   Primary Care Provider: JACKSON [place]         Primary Phone: [phone]   EMERGENCY CONTACT   Contact Name Legal Guardian? Relationship to Patient Home Phone Work Phone   1. MarioBishop  2. [address] [facility] [phone]              GUARANTOR            Guarantor: Sho Ibarra     : 1940   Address: [address] [place] [address] Sex: Female     [place] [address]     Relation to Patient: Self       Home Phone: [phone]   Guarantor ID: [id number]       Work Phone:     Guarantor Employer: RETIRED         Status: RETIRED      COVERAGE        PRIMARY INSURANCE   Payor: Juancarlos Collins Plan: [place] [employer]   Payor Address: [address]       Group Number: [id number] Insurance Type: [address] Name: Nuria Rios : 1940   Subscriber ID: [id number] Pat. Rel. to Sub: Self   SECONDARY INSURANCE   Payor:   Plan:     Payor Address:  ,           Group Number:   Insurance Type:     Subscriber Name:   Subscriber :     Subscriber ID:   Pat.  Rel. to Sub:           CSN: [id number]        Order Requisition for [facility]  CSN: [id number]   Order Date:  Oct 25, 2022             Patient Information: Laura Navarro       :  1940  Age:  [de-identified] y.o. Sex:  F  Home Phone: [phone]  Work Phone:   SSN: [id number]  Address:  [address] [place] [address] MRN:  [id number] MRN:  [id number]  PCP: DANDRE Lemus CNP  PCP Phone: [phone]           Ordering Dept: [address] [place] [address] [facility]     Site: [address] [place] [address] Av  Ph: [phone] Fax:    Address: [address] Ordering User: Radha Sow MD  Provider ID: [id number]  NPI:  [id number]               Test Ordered: Misc nursing order (specify) Georgie Boast   Code: [id number]   ORD #: [id number]  Associated Diagnosis:   Comments: Jobst stocking bilateral knee high 20-30 mmhg    Priority  Routine Class  Hospital Performed      Order Status    Expected Date    Specimen Source    Collection Date    Collection Time    Occurrences Remaining    Interval  ONE TIME      Electronically Signed By  Radha Sow MD  NPI:  [id number] Date  Oct 25, 2022  4:23 PM              Responsible Party Myles Nayak     Guar-ActID   Relationship Account Type Home Phone   [address] / [facility] Self P/F [phone]   Employer   Work Phone   [address]  Insurance/Subscriber ID:  [id number]  [address] Name:  Mariam VALLADARES              Relationship to Patient: SelfSigned ABN: N    Payor Name:  [address]:  Altagracia Glover   Group: [id number]  Worker's Comp Date of Injury:

## 2022-10-26 NOTE — PROGRESS NOTES
Physical Therapy  Facility/Department: [address] [place] [address]  Physical Therapy Initial Assessment    Name: Juliet Avilez  : 1940  MRN: [id number]  Date of Service: 10/26/2022    Discharge Recommendations:  Patient would benefit from continued therapy after discharge   PT Equipment Recommendations  Equipment Needed: No      Patient Diagnosis(es): The primary encounter diagnosis was Frequent falls. Diagnoses of Closed fracture of nasal bone, initial encounter and Multiple contusions were also pertinent to this visit. Past Medical History:  has a past medical history of Atrial fibrillation ([address] Hypertension, and Thyroid disease. Past Surgical History:  has a past surgical history that includes Carpal tunnel release (Bilateral). Assessment   Body Structures, Functions, Activity Limitations Requiring Skilled Therapeutic Intervention: Decreased functional mobility ; Decreased endurance;Decreased balance;Decreased strength;Decreased safe awareness  Assessment: continue per POC to maxmize potential for safe D/C  Treatment Diagnosis: impaired mobility due to weakness  Specific Instructions for Next Treatment: advance gait distance using wheeled walker and progress to steps, instruct in exercise program  Therapy Prognosis: Good  Decision Making: Medium Complexity  History: pt admitted due to generalized weakness  Exam: ROM, MMT, balance and mobility assessments  Clinical Presentation: gait w/ wheeled walker 20' x 3 and 25' x 1 w/ CGA x 1, FALL RISK, SBA x 1 for bed mobility, CGA x 1 for transfers  Requires PT Follow-Up: Yes  Activity Tolerance  Activity Tolerance: Patient limited by fatigue;Patient limited by endurance     Plan   Physcial Therapy Plan  General Plan: 5-7 times per week  Specific Instructions for Next Treatment: advance gait distance using wheeled walker and progress to steps, instruct in exercise program  Current Treatment Recommendations: Strengthening, Gait training, Balance training, Functional mobility training, Stair training, Home exercise program, Transfer training, Safety education & training, Patient/Caregiver education & training, Endurance training, Equipment evaluation, education, & procurement  Safety Devices  Type of Devices: Patient at risk for falls, All fall risk precautions in place, Left in chair, Call light within reach, Chair alarm in place, Nurse notified, Gait belt (nurse Ray Tucker)     Restrictions  Restrictions/Precautions  Restrictions/Precautions: Fall Risk, Up as Tolerated (peripheral IV right antecubital)  Required Braces or Orthoses?: No     Subjective   Pain: denies  General  Patient assessed for rehabilitation services?: Yes  Response To Previous Treatment: Not applicable  Family / Caregiver Present: No  Referring Practitioner: Dr. Inge Tyler  Referral Date : 10/25/22  Diagnosis: generalized weakness  Follows Commands: Within Functional Limits  Other (Comment): OK per nurse Dinorah to proceed w/ PT evaluation  General Comment  Comments: CT scan of the facial bones shows bilateral nasal bone fractures. X-Rays of bilateral hips and knees are negative. Subjective  Subjective: Pt reports that she fell while turning on the steps to lock her door. Pt reports that she tripped on her cane and fell. Pt reports 3 falls recently.          Social/Functional History  Social/Functional History  Lives With: Alone  Type of Home: House  Home Layout: Bed/Bath upstairs, Two level, Laundry in basement  Home Access: Stairs to enter without rails  Entrance Stairs - Number of Steps: 2 wo rail to enter, / 14 steps w/ right rail to 2nd floor bedroom, 9 steps w/ right rail to descend to basement laundry  Entrance Stairs - Rails: None  Bathroom Shower/Tub: Walk-in shower (uses walkin shower on first floor, has tub shower on second floor)  Bathroom Toilet: Standard (w/ grab bars)  Bathroom Equipment: Grab bars in shower, Grab bars around toilet  Home Equipment: rachel Gottlieb (has 2 walkers and keeps one on each level of the house)  Has the patient had two or more falls in the past year or any fall with injury in the past year?: Yes (4 falls in 10 days- tripped over cane while descending 2 steps on last fall)  ADL Assistance: [address]: Independent  Homemaking Responsibilities: Yes  Ambulation Assistance: Independent (no device, uses s cane outside the home, hx multiple falls in the last 2 weeks)  Transfer Assistance: Independent  Active : Yes  Mode of Transportation: Car  Occupation: Retired  IADL Comments: sleeps in a flat bed  Additional Comments: states that she has no one to call to assist if needed  Vision/Hearing  Vision  Vision: Impaired  Vision Exceptions: Wears glasses at all times  Hearing  H[facility]  Hearing Exceptions: Hard of hearing/hearing concerns (right ear is plugged up for last 2 months)    Cognition   Orientation  Overall Orientation Status: Within Functional Limits  Orientation Level: Oriented X4;Oriented to place;Oriented to time;Oriented to situation;Oriented to person     Objective   O2 Device: None (Room air)     Observation/Palpation  Observation: peripheral IV right antecubital, abrasion right lateral eyebrow and left elbow, contusion bridge of the nose  Edema: 1 + edema bilateral LEs  Gross Assessment  Sensation: Impaired (C/O tingling bilateral hands)     AROM RLE (degrees)  RLE AROM: WFL  AROM LLE (degrees)  LLE AROM : WFL  AROM RUE (degrees)  RUE General AROM: see OT for UE  assessment  AROM LUE (degrees)  LUE General AROM: see OT for UE  assessment  Strength RLE  Comment: grossly 4-/5  Strength LLE  Comment: grossly 4-/5  Strength RUE  Comment: see OT for UE  assessment  Strength LUE  Comment: see OT for UE  assessment           Bed mobility  Rolling to Left: Stand by assistance  Supine to Sit: Stand by assistance  Transfers  Sit to Stand: Contact guard assistance  Stand to Sit: Contact guard assistance  Stand Pivot Transfers: Contact guard assistance (used wheeled walker)  Comment: commode transfer completed  Ambulation  Surface: Level tile  Device: Rolling Walker  Assistance: Contact guard assistance  Quality of Gait: fast pace  Distance: 20', 25' and 20' x 2  Comments: reports that she feels more secure using wheeled walker  Stairs/Curb  Stairs?: No (2 steps w/o rail to enter)     Balance  Sitting - Static: Good  Sitting - Dynamic: Good  Standing - Static: Good;- (used wheeled walker)  Standing - Dynamic: Fair;+ (used wheeled walker)           OutComes Score                                                  AM-PAC Score  AM-PAC Inpatient Mobility Raw Score : 16 (10/26/22 1112)  AM-PAC Inpatient T-Scale Score : 40.78 (10/26/22 1112)  Mobility Inpatient CMS 0-100% Score: 54.16 (10/26/22 1112)  Mobility Inpatient CMS G-Code Modifier : CK (10/26/22 1112)          Tinneti Score       Goals  Short Term Goals  Time Frame for Short Term Goals: 5-7 treatments/ week  Short Term Goal 1: pt to demonstrate independent bed mobility on a flat surface  Short Term Goal 2: pt to demonstrate MODIFIED independent transfers using wheeled walker  Short Term Goal 3: pt to demonstrate MODIFIED independent gait 150-200' using wheeled walker for community distances  Short Term Goal 4: pt to demonstrate good ambulatory distance using wheeled walker  Short Term Goal 5: pt to demonstrate ability to ascend/ descend 2 steps w/o rail w/ SBA x 1 for home entry  Short Term Goal 6: pt to demonstrate good technique for exercise program for balance activities and general strengthening exercises  Patient Goals   Patient Goals : return home       Education  Patient Education  Education Given To: Patient  Education Provided: Role of Therapy;Plan of Care  Education Method: Demonstration;Verbal  Education Outcome: Continued education needed      Therapy Time   Individual Concurrent Group Co-treatment   Time In 1112         Time Out 1152         Minutes 40         Timed Code Treatment Minutes: [address] [facility], PT

## 2022-10-26 NOTE — PROGRESS NOTES
Progress Note    10/26/2022   3:17 PM    Name:  Haylee Gleason  MRN:    [id number]     Harjeetide:     [de-identified]   Room:  [facility] Day: 1     Admit Date: 10/25/2022 12:32 PM  PCP: DANDRE Rhodes CNP    Subjective:     C/C:   Chief Complaint   Patient presents with    Fall     Hit head, no loc       Interval History: Status: not changed. Patient has increased generalized weakness and lightheadedness. 4 episodes of loose stool since last night. Denies chest pain shortness of breath nausea vomiting or abdominal pain. Vital signs reviewed and stable. Recent labs reviewed sodium 135, hemoglobin 10.7    ROS:   all 10 systems reviewed and are negative except as noted    Review of Systems   Constitutional:  Negative for chills and fatigue. HENT:  Negative for drooling, mouth sores, sneezing and trouble swallowing. Eyes:  Negative for redness and itching. Respiratory:  Negative for cough, chest tightness and shortness of breath. Cardiovascular:  Negative for chest pain, palpitations and leg swelling. Gastrointestinal:  Positive for diarrhea. Negative for abdominal pain, blood in stool, nausea and vomiting. Endocrine: Negative for heat intolerance and polyphagia. Genitourinary:  Negative for difficulty urinating, flank pain and pelvic pain. Musculoskeletal:  Negative for arthralgias, joint swelling and neck stiffness. Skin:  Negative for color change and pallor. Allergic/Immunologic: Negative for food allergies. Neurological:  Positive for light-headedness. Negative for dizziness, seizures and headaches. Hematological:  Does not bruise/bleed easily. Psychiatric/Behavioral:  Negative for agitation, behavioral problems and suicidal ideas. The patient is not hyperactive. Medications: Allergies:    Allergies   Allergen Reactions    Latex Rash    Pcn [Penicillins] Anaphylaxis    Dilaudid [Hydromorphone] Other (See Comments)     Abnormal behaviors    Norco [Hydrocodone-Acetaminophen] Other (See Comments)     Abnormal behavior    Nitrofurantoin Palpitations    Sulfa Antibiotics Rash       Current Meds: warfarin (COUMADIN) tablet 1.25 mg, Once  lipase-protease-amylase (ZENPEP) delayed release capsule 5,000 Units, TID WC  0.9 % sodium chloride infusion, Continuous  ferrous sulfate (IRON 325) tablet 325 mg, Daily with breakfast  sodium chloride flush 0.9 % injection 10 mL, 2 times per day  sodium chloride flush 0.9 % injection 10 mL, PRN  0.9 % sodium chloride infusion, PRN  potassium chloride (KLOR-CON M) extended release tablet 40 mEq, PRN   Or  potassium bicarb-citric acid (EFFER-K) effervescent tablet 40 mEq, PRN   Or  potassium chloride 10 mEq/100 mL IVPB (Peripheral Line), PRN  magnesium sulfate 1000 mg in dextrose 5% 100 mL IVPB, PRN  ondansetron (ZOFRAN-ODT) disintegrating tablet 4 mg, Q8H PRN   Or  ondansetron (ZOFRAN) injection 4 mg, Q6H PRN  magnesium hydroxide (MILK OF MAGNESIA) 400 MG/5ML suspension 30 mL, Daily PRN  acetaminophen (TYLENOL) tablet 650 mg, Q6H PRN   Or  acetaminophen (TYLENOL) suppository 650 mg, Q6H PRN  ipratropium-albuterol (DUONEB) nebulizer solution 1 ampule, Q4H PRN  warfarin placeholder: dosing by pharmacy, RX Placeholder  levothyroxine (SYNTHROID) tablet 100 mcg, Daily  glucose chewable tablet 16 g, PRN  dextrose bolus 10% 125 mL, PRN   Or  dextrose bolus 10% 250 mL, PRN  glucagon (rDNA) injection 1 mg, PRN  dextrose 10 % infusion, Continuous PRN  insulin lispro (HUMALOG) injection vial 0-4 Units, TID WC  insulin lispro (HUMALOG) injection vial 0-4 Units, Nightly      Data:     Code Status:  Full Code    No family history on file.     Social History     Socioeconomic History    Marital status:      Spouse name: Not on file    Number of children: Not on file    Years of education: Not on file    Highest education level: Not on file   Occupational History    Not on file   Tobacco Use    Smoking status: Never    Smokeless tobacco: Not on file   Substance and Sexual Activity    Alcohol use: No    Drug use: No    Sexual activity: Not on file   Other Topics Concern    Not on file   Social History Narrative    Not on file     Social Determinants of Health     Financial Resource Strain: Not on file   Food Insecurity: Not on file   Transportation Needs: Not on file   Physical Activity: Not on file   Stress: Not on file   Social Connections: Not on file   Intimate Partner Violence: Not on file   Housing Stability: Not on file       I/O (24Hr): Intake/Output Summary (Last 24 hours) at 10/26/2022 1517  Last data filed at 10/26/2022 1411  Gross per 24 hour   Intake 2186.66 ml   Output 400 ml   Net 1786.66 ml     Radiology:  XR ELBOW LEFT (MIN 3 VIEWS)    Result Date: 10/25/2022  1. Mild degenerative changes of the ulnohumeral joint. 2. No acute fracture or dislocation. XR HIP LEFT (2-3 VIEWS)    Result Date: 10/25/2022  No acute osseous abnormality in the bilateral hips or knees. Given the degree of osteopenia, nondisplaced fractures may be radiographically occult. If pain or concern for fracture persists, consider CT or MR imaging. XR HIP RIGHT (2-3 VIEWS)    Result Date: 10/25/2022  No acute osseous abnormality in the bilateral hips or knees. Given the degree of osteopenia, nondisplaced fractures may be radiographically occult. If pain or concern for fracture persists, consider CT or MR imaging. XR KNEE LEFT (3 VIEWS)    Result Date: 10/25/2022  No acute osseous abnormality in the bilateral hips or knees. Given the degree of osteopenia, nondisplaced fractures may be radiographically occult. If pain or concern for fracture persists, consider CT or MR imaging. XR KNEE RIGHT (3 VIEWS)    Result Date: 10/25/2022  No acute osseous abnormality in the bilateral hips or knees. Given the degree of osteopenia, nondisplaced fractures may be radiographically occult. If pain or concern for fracture persists, consider CT or MR imaging. CT HEAD WO CONTRAST    Result Date: 10/25/2022  No acute intracranial abnormality identified by CT. CT FACIAL BONES WO CONTRAST    Result Date: 10/25/2022  Acute nondisplaced nasal bone fractures bilaterally. CT CERVICAL SPINE WO CONTRAST    Result Date: 10/25/2022  No acute cervical spine fracture.        Labs:  Recent Results (from the past 24 hour(s))   EKG 12 Lead    Collection Time: 10/25/22  5:12 PM   Result Value Ref Range    Ventricular Rate 72 BPM    Atrial Rate 72 BPM    P-R Interval 182 ms    QRS Duration 52 ms    Q-T Interval 444 ms    QTc Calculation (Bazett) 486 ms    P Axis 48 degrees    R Axis 31 degrees    T Axis 30 degrees   Vitamin B12 & Folate    Collection Time: 10/26/22  5:44 AM   Result Value Ref Range    Vitamin B-12 1087 232 - 1245 pg/mL    Folate 18.9 >4.8 ng/mL   Comprehensive Metabolic Panel w/ Reflex to MG    Collection Time: 10/26/22  5:44 AM   Result Value Ref Range    Glucose 137 (H) 70 - 99 mg/dL    BUN 10 8 - 23 mg/dL    Creatinine 0.86 0.50 - 0.90 mg/dL    Est, Glom Filt Rate >60 >60 mL/min/1.73m2    Calcium 7.0 (L) 8.6 - 10.4 mg/dL    Sodium 135 135 - 144 mmol/L    Potassium 3.9 3.7 - 5.3 mmol/L    Chloride 100 98 - 107 mmol/L    CO2 27 20 - 31 mmol/L    Anion Gap 8 (L) 9 - 17 mmol/L    Alkaline Phosphatase 194 (H) 35 - 104 U/L    ALT 73 (H) 5 - 33 U/L    AST 74 (H) <32 U/L    Total Bilirubin 0.4 0.3 - 1.2 mg/dL    Total Protein 4.9 (L) 6.4 - 8.3 g/dL    Albumin 2.3 (L) 3.5 - 5.2 g/dL   CBC auto differential    Collection Time: 10/26/22  5:44 AM   Result Value Ref Range    WBC 7.8 3.5 - 11.0 k/uL    RBC 3.84 (L) 4.0 - 5.2 m/uL    Hemoglobin 10.7 (L) 12.0 - 16.0 g/dL    Hematocrit 32.6 (L) 36 - 46 %    MCV 84.9 80 - 100 fL    MCH 28.0 26 - 34 pg    MCHC 33.0 31 - 37 g/dL    RDW 16.4 (H) 11.5 - 14.9 %    Platelets 167 734 - 031 k/uL    MPV 7.6 6.0 - 12.0 fL    Seg Neutrophils 55 36 - 66 %    Lymphocytes 33 24 - 44 %    Monocytes 10 (H) 1 - 7 %    Eosinophils % 1 0 - 4 % Basophils 1 0 - 2 %    Segs Absolute 4.20 1.3 - 9.1 k/uL    Absolute Lymph # 2.60 1.0 - 4.8 k/uL    Absolute Mono # 0.80 0.1 - 1.3 k/uL    Absolute Eos # 0.10 0.0 - 0.4 k/uL    Basophils Absolute 0.10 0.0 - 0.2 k/uL   Protime-INR    Collection Time: 10/26/22  5:44 AM   Result Value Ref Range    Protime 27.9 (H) 11.8 - 14.6 sec    INR 2.6    Hepatitis Panel, Acute    Collection Time: 10/26/22  5:44 AM   Result Value Ref Range    Hepatitis B Surface Ag NONREACTIVE NONREACTIVE    Hepatitis C Ab NONREACTIVE NONREACTIVE    Hep B Core Ab, IgM NONREACTIVE NONREACTIVE    Hep A IgM NONREACTIVE NONREACTIVE   POC Glucose Fingerstick    Collection Time: 10/26/22 10:43 AM   Result Value Ref Range    POC Glucose 234 (H) 65 - 105 mg/dL       Physical Examination:        Vitals:  BP (!) 108/58   Pulse 78   Temp 97.8 °F (36.6 °C) (Oral)   Resp 18   Ht 5' (1.524 m)   Wt 106 lb 0.7 oz (48.1 kg)   SpO2 99%   BMI 20.71 kg/m²   Temp (24hrs), Av.1 °F (36.7 °C), Min:97.5 °F (36.4 °C), Max:98.7 °F (37.1 °C)    Recent Labs     10/26/22  1043   POCGLU 234*         Physical Exam  Vitals reviewed. HENT:      Head: Normocephalic. Right Ear: External ear normal.      Left Ear: External ear normal.      Nose: Nose normal.      Mouth/Throat:      Mouth: Mucous membranes are moist.      Pharynx: Oropharynx is clear. Eyes:      Conjunctiva/sclera: Conjunctivae normal.   Cardiovascular:      Rate and Rhythm: Normal rate and regular rhythm. Pulses: Normal pulses. Heart sounds: Normal heart sounds. Pulmonary:      Effort: Pulmonary effort is normal.      Breath sounds: Normal breath sounds. No wheezing or rales. Abdominal:      General: Bowel sounds are normal.      Palpations: Abdomen is soft. Tenderness: There is no abdominal tenderness. There is no right CVA tenderness or left CVA tenderness. Musculoskeletal:         General: No deformity. Cervical back: Normal range of motion and neck supple. Right lower leg: No edema. Left lower leg: No edema. Skin:     General: Skin is warm. Capillary Refill: Capillary refill takes less than 2 seconds. Coloration: Skin is not jaundiced. Neurological:      General: No focal deficit present. Mental Status: She is alert. Mental status is at baseline. Psychiatric:         Mood and Affect: Mood normal.         Behavior: Behavior normal.       Assessment:        Primary Problem  Generalized weakness     Principal Problem:    Generalized weakness  Active Problems:    COPD (chronic obstructive pulmonary disease) (HCC)    Paroxysmal atrial fibrillation (HCC)    Iron deficiency anemia    Hypothyroidism    Closed fracture of nasal bone    Elevated LFTs    Diabetes mellitus (HCC)    Exocrine pancreatic insufficiency  Resolved Problems:    * No resolved hospital problems. *      Past Medical History:   Diagnosis Date    Atrial fibrillation ([facility] [address]     Hypertension     Thyroid disease         Plan:        IV normal saline at 100 mL/hour    Patient has orthostatic hypotension,Jobst stockings bilateral knee-high  20-30 mmHg  Iron study shows iron deficiency anemia. Ferrous sulfate 325 mg p.o. daily  FOBT pending  Continue Creon for exocrine pancreatic insufficiency. Consult GI for diarrhea   Check UA   Bilateral carotid Dopplers pending    EKG shows normal sinus rhythm. 2D echo 10/13/2022 from care reviewed shows an ejection fraction of 55% with no aortic stenosis  Urology input noted   TSH elevated at 37.09, T4 1.02, increase Synthroid to 100 mcg p.o. daily   CT brain report, CT cervical report does not show any acute abnormality. CT facial shows acute nondisplaced nasal bone fracture bilaterally.   Referral made for ENT follow-up as outpatient   X-ray left elbow report, x-ray left hip report, x-ray right hip report x-ray left knee, x-ray right knee report does not show any acute abnormality   DVT prophylaxis on Coumadin INR therapeutic   Nutritional supplements 3 times a day  EPCs  PT/OT to evaluate and treat  Replace electrolytes as per sliding scale  Home medications reviewed and appropriate medications continued  Reviewed labs and imaging studies from last 24 hours and results explained to patient      Electronically signed by Joey Laurent MD

## 2022-10-26 NOTE — PROGRESS NOTES
Comprehensive Nutrition Assessment    Type and Reason for Visit:  Positive Nutrition Screen (weight loss)    Nutrition Recommendations/Plan:   Change diet from Regular to Carbohydrate Controlled per pt request. Discontinuing supplements since pt won't drink them. Malnutrition Assessment:  Malnutrition Status: At risk for malnutrition (Comment) (10/26/22 6237)    Context:  Acute Illness     Findings of the 6 clinical characteristics of malnutrition:  Energy Intake:  No significant decrease in energy intake  Weight Loss:  No significant weight loss (not recently per pt)     Body Fat Loss:   (frail appearance)     Muscle Mass Loss:  Unable to assess    Fluid Accumulation:  No significant fluid accumulation     Strength:  Not Performed    Nutrition Assessment:    Pt to ED due to multiple contusions from falling over her cane, had been having dizzy spells for about 2 weeks. Pt stated her appetite is fine and lost weight when she had bouts of pancreatitis 15 months ago. Pt states \"first they thought it was caused by my gallbladder and removed it but it kept happening so removed my pancreas and problem was fixed but became diabetic. \"  Pt was upset cause her blood sugars are elevated and she isn't on a diabetic diet. PCP had ordered Ensure Enlive with 3 observed unopen on pt's tray table. Pt noted it was high in sugar and writer offered Glucerna with daughter stating \"you may as well not send it she won't drink those. \"  Daughter states she is allowed 75 gm carbohydrate each meal.    Nutrition Related Findings:    No edema. Labs & meds reviewed. Wound Type: None       Current Nutrition Intake & Therapies:    Average Meal Intake: %  Average Supplements Intake: Refusing to take  ADULT DIET;  Regular  ADULT ORAL NUTRITION SUPPLEMENT; Breakfast, Lunch, Dinner; Standard High Calorie/High Protein Oral Supplement    Anthropometric Measures:  Height: 5' (152.4 cm)  Ideal Body Weight (IBW): 100 lbs (45 kg)    Admission Body Weight: 106 lb (48.1 kg)  Current Body Weight: 106 lb (48.1 kg), 106 % IBW. Weight Source: Bed Scale  Current BMI (kg/m2): 20.7                          BMI Categories: Underweight (BMI less than 22) age over 72    Estimated Daily Nutrient Needs:  Energy Requirements Based On: Kcal/kg  Weight Used for Energy Requirements: Admission  Energy (kcal/day): 1680 kcals based on 35 kcals/kg  Weight Used for Protein Requirements: Admission  Protein (g/day): 58 gm protein based on 1.2 gm/kg         Nutrition Diagnosis:   Underweight related to altered GI function as evidenced by weight loss    Nutrition Interventions:   Food and/or Nutrient Delivery: Continue Current Diet, Discontinue Oral Nutrition Supplement  Nutrition Education/Counseling: No recommendation at this time  Coordination of Nutrition Care: Continue to monitor while inpatient       Goals:     Goals: PO intake 50% or greater       Nutrition Monitoring and Evaluation:   Behavioral-Environmental Outcomes: None Identified  Food/Nutrient Intake Outcomes: Food and Nutrient Intake  Physical Signs/Symptoms Outcomes: Biochemical Data, GI Status, Nutrition Focused Physical Findings, Skin, Weight    Discharge Planning:     Too soon to determine     Delia Mcgarry, 68 [address], [place]  [phone]

## 2022-10-26 NOTE — PLAN OF CARE
Problem: Discharge Planning  Goal: Discharge to home or other facility with appropriate resources  Outcome: Progressing     Problem: Safety - Adult  Goal: Free from fall injury  Outcome: Progressing     Problem: ABCDS Injury Assessment  Goal: Absence of physical injury  Outcome: Progressing     Problem: Cardiovascular - Adult  Goal: Maintains optimal cardiac output and hemodynamic stability  Outcome: Progressing     Problem: Cardiovascular - Adult  Goal: Absence of cardiac dysrhythmias or at baseline  Outcome: Progressing     Problem: Chronic Conditions and Co-morbidities  Goal: Patient's chronic conditions and co-morbidity symptoms are monitored and maintained or improved  Outcome: Progressing     Problem: Nutrition Deficit:  Goal: Optimize nutritional status  Outcome: Not Progressing  Note: PO intake is fair and pt continues to demonstrate signs of overall poor nutrition AEB low albumin levels     Problem: Nutrition Deficit:  Goal: Optimize nutritional status  Outcome: Not Progressing  Note: PO intake is fair and pt continues to demonstrate signs of overall poor nutrition AEB low albumin levels

## 2022-10-26 NOTE — PROGRESS NOTES
Pharmacy Note  Warfarin Consult follow-up      Recent Labs     10/25/22  1253 10/26/22  0544   INR 2.7 2.6     Recent Labs     10/25/22  1253 10/26/22  0544   HGB 10.5* 10.7*   HCT 32.0* 32.6*    286       Significant Drug-Drug Interactions:  New warfarin drug-drug interactions: none  Discontinued drug-drug interactions: none  Current warfarin drug-drug interactions: Synthroid      Date             INR        Dose given previous day  Dose scheduled for today  10/26/2022            2.6       1.25mg           1.25mg        Notes:                   INR therapeutic. Will schedule 1.25mg dose today. Daily PT/INR while inpatient.    [facility]    10/26/2022   8:04 AM

## 2022-10-26 NOTE — ACP (ADVANCE CARE PLANNING)
Advance Care Planning     Advance Care Planning Activator (Inpatient)  Conversation Note      Date of ACP Conversation: 10/26/2022     Conversation Conducted with: Patient with Decision Making Capacity    ACP Activator: Isela Yeboah RN        Health Care Decision Maker:     Current Designated Health Care Decision Maker:     Primary Decision Maker: Rosa M [place] Child - [phone]    Primary Decision Maker: Joya Morocho - Child - [phone]  Click here to complete Healthcare Decision Makers including section of the Healthcare Decision Maker Relationship (ie \"Primary\")      Care Preferences    Ventilation: \"If you were in your present state of health and suddenly became very ill and were unable to breathe on your own, what would your preference be about the use of a ventilator (breathing machine) if it were available to you? \"      Would the patient desire the use of ventilator (breathing machine)?: yes    \"If your health worsens and it becomes clear that your chance of recovery is unlikely, what would your preference be about the use of a ventilator (breathing machine) if it were available to you? \"     Would the patient desire the use of ventilator (breathing machine)?: Yes      Resuscitation  \"CPR works best to restart the heart when there is a sudden event, like a heart attack, in someone who is otherwise healthy. Unfortunately, CPR does not typically restart the heart for people who have serious health conditions or who are very sick. \"    \"In the event your heart stopped as a result of an underlying serious health condition, would you want attempts to be made to restart your heart (answer \"yes\" for attempt to resuscitate) or would you prefer a natural death (answer \"no\" for do not attempt to resuscitate)? \" yes       [] Yes   [] No   Educated Patient / [place] Grandchild regarding differences between Advance Directives and portable DNR orders.     Length of ACP Conversation in minutes:      Rogers Ott Outcomes:  [x] ACP discussion completed  [] Existing advance directive reviewed with patient; no changes to patient's previously recorded wishes  [] New Advance Directive completed  [] Portable Do Not Rescitate prepared for Provider review and signature  [] POLST/POST/MOLST/MOST prepared for Provider review and signature      Follow-up plan:    [] Schedule follow-up conversation to continue planning  [] Referred individual to Provider for additional questions/concerns   [] Advised patient/agent/surrogate to review completed ACP document and update if needed with changes in condition, patient preferences or care setting    [] This note routed to one or more involved healthcare providers

## 2022-10-26 NOTE — PLAN OF CARE
Problem: Discharge Planning  Goal: Discharge to home or other facility with appropriate resources  10/26/2022 0229 by Monique Weiss RN  Outcome: Progressing  10/25/2022 1948 by Agnes Osei RN  Outcome: Progressing     Problem: Safety - Adult  Goal: Free from fall injury  10/26/2022 0229 by Monique Weiss RN  Outcome: Progressing  Note: Patient weak. Alert and oriented. Bed alarm set. Assisted to bathroom with one assist. Gait steady.   10/25/2022 1948 by Agnes Osei RN  Outcome: Progressing     Problem: ABCDS Injury Assessment  Goal: Absence of physical injury  10/26/2022 0229 by Monique Weiss RN  Outcome: Progressing  10/25/2022 1948 by Agens Osei RN  Outcome: Progressing

## 2022-10-27 PROBLEM — R19.7 DIARRHEA: Status: ACTIVE | Noted: 2022-10-27

## 2022-10-27 PROBLEM — D64.9 ANEMIA: Status: ACTIVE | Noted: 2022-10-27

## 2022-10-27 PROBLEM — R29.6 FREQUENT FALLS: Status: ACTIVE | Noted: 2022-10-27

## 2022-10-27 LAB
ABSOLUTE EOS #: 0.1 K/UL (ref 0–0.4)
ABSOLUTE LYMPH #: 1.9 K/UL (ref 1–4.8)
ABSOLUTE MONO #: 1 K/UL (ref 0.1–1.3)
AFP: 2.3 UG/L
ALBUMIN SERPL-MCNC: 1.9 G/DL (ref 3.5–5.2)
ALP BLD-CCNC: 177 U/L (ref 35–104)
ALPHA-1 ANTITRYPSIN: 75 MG/DL (ref 90–200)
ALT SERPL-CCNC: 59 U/L (ref 5–33)
ANION GAP SERPL CALCULATED.3IONS-SCNC: 8 MMOL/L (ref 9–17)
AST SERPL-CCNC: 53 U/L
BACTERIA: ABNORMAL
BASOPHILS # BLD: 1 % (ref 0–2)
BASOPHILS ABSOLUTE: 0.1 K/UL (ref 0–0.2)
BILIRUB SERPL-MCNC: 0.4 MG/DL (ref 0.3–1.2)
BILIRUBIN URINE: NEGATIVE
BUN BLDV-MCNC: 9 MG/DL (ref 8–23)
C DIFF AG + TOXIN: NEGATIVE
CALCIUM SERPL-MCNC: 6.8 MG/DL (ref 8.6–10.4)
CASTS UA: ABNORMAL /LPF
CASTS UA: ABNORMAL /LPF
CERULOPLASMIN: 7 MG/DL (ref 16–45)
CHLORIDE BLD-SCNC: 103 MMOL/L (ref 98–107)
CMV IGM: 0.2
CO2: 24 MMOL/L (ref 20–31)
COLOR: ABNORMAL
CREAT SERPL-MCNC: 0.7 MG/DL (ref 0.5–0.9)
CRYSTALS, UA: ABNORMAL /HPF
CRYSTALS, UA: ABNORMAL /HPF
DATE, STOOL #1: NORMAL
EOSINOPHILS RELATIVE PERCENT: 1 % (ref 0–4)
EPITHELIAL CELLS UA: ABNORMAL /HPF
GFR SERPL CREATININE-BSD FRML MDRD: >60 ML/MIN/1.73M2
GLUCOSE BLD-MCNC: 136 MG/DL (ref 70–99)
GLUCOSE BLD-MCNC: 157 MG/DL (ref 65–105)
GLUCOSE BLD-MCNC: 164 MG/DL (ref 65–105)
GLUCOSE BLD-MCNC: 319 MG/DL (ref 65–105)
GLUCOSE BLD-MCNC: 327 MG/DL (ref 65–105)
GLUCOSE URINE: ABNORMAL
HCT VFR BLD CALC: 31.3 % (ref 36–46)
HEMOCCULT SP1 STL QL: NEGATIVE
HEMOGLOBIN: 10 G/DL (ref 12–16)
IGM: 103 MG/DL (ref 40–230)
INR BLD: 2.2
KETONES, URINE: ABNORMAL
LEUKOCYTE ESTERASE, URINE: NEGATIVE
LYMPHOCYTES # BLD: 28 % (ref 24–44)
MAGNESIUM: 2 MG/DL (ref 1.6–2.6)
MCH RBC QN AUTO: 27.9 PG (ref 26–34)
MCHC RBC AUTO-ENTMCNC: 32 G/DL (ref 31–37)
MCV RBC AUTO: 87 FL (ref 80–100)
MONOCYTES # BLD: 14 % (ref 1–7)
NITRITE, URINE: NEGATIVE
PDW BLD-RTO: 17 % (ref 11.5–14.9)
PH UA: 5.5 (ref 5–8)
PLATELET # BLD: 185 K/UL (ref 150–450)
PMV BLD AUTO: 8.6 FL (ref 6–12)
POTASSIUM SERPL-SCNC: 3.3 MMOL/L (ref 3.7–5.3)
PROTEIN UA: ABNORMAL
PROTHROMBIN TIME: 24.4 SEC (ref 11.8–14.6)
RBC # BLD: 3.6 M/UL (ref 4–5.2)
RBC UA: ABNORMAL /HPF
SEG NEUTROPHILS: 56 % (ref 36–66)
SEGMENTED NEUTROPHILS ABSOLUTE COUNT: 3.8 K/UL (ref 1.3–9.1)
SODIUM BLD-SCNC: 135 MMOL/L (ref 135–144)
SPECIFIC GRAVITY UA: 1.03 (ref 1–1.03)
SPECIMEN DESCRIPTION: NORMAL
TIME, STOOL #1: NORMAL
TOTAL PROTEIN: 4.4 G/DL (ref 6.4–8.3)
TURBIDITY: CLEAR
URINE HGB: NEGATIVE
UROBILINOGEN, URINE: NORMAL
WBC # BLD: 6.9 K/UL (ref 3.5–11)
WBC UA: ABNORMAL /HPF

## 2022-10-27 PROCEDURE — 83735 ASSAY OF MAGNESIUM: CPT

## 2022-10-27 PROCEDURE — 86225 DNA ANTIBODY NATIVE: CPT

## 2022-10-27 PROCEDURE — 87506 IADNA-DNA/RNA PROBE TQ 6-11: CPT

## 2022-10-27 PROCEDURE — 87449 NOS EACH ORGANISM AG IA: CPT

## 2022-10-27 PROCEDURE — 86038 ANTINUCLEAR ANTIBODIES: CPT

## 2022-10-27 PROCEDURE — 85610 PROTHROMBIN TIME: CPT

## 2022-10-27 PROCEDURE — 6360000002 HC RX W HCPCS: Performed by: INTERNAL MEDICINE

## 2022-10-27 PROCEDURE — 82390 ASSAY OF CERULOPLASMIN: CPT

## 2022-10-27 PROCEDURE — APPNB30 APP NON BILLABLE TIME 0-30 MINS: Performed by: NURSE PRACTITIONER

## 2022-10-27 PROCEDURE — 83516 IMMUNOASSAY NONANTIBODY: CPT

## 2022-10-27 PROCEDURE — P9047 ALBUMIN (HUMAN), 25%, 50ML: HCPCS | Performed by: INTERNAL MEDICINE

## 2022-10-27 PROCEDURE — 86665 EPSTEIN-BARR CAPSID VCA: CPT

## 2022-10-27 PROCEDURE — 86664 EPSTEIN-BARR NUCLEAR ANTIGEN: CPT

## 2022-10-27 PROCEDURE — 82103 ALPHA-1-ANTITRYPSIN TOTAL: CPT

## 2022-10-27 PROCEDURE — 82947 ASSAY GLUCOSE BLOOD QUANT: CPT

## 2022-10-27 PROCEDURE — 86663 EPSTEIN-BARR ANTIBODY: CPT

## 2022-10-27 PROCEDURE — 82270 OCCULT BLOOD FECES: CPT

## 2022-10-27 PROCEDURE — 99222 1ST HOSP IP/OBS MODERATE 55: CPT | Performed by: INTERNAL MEDICINE

## 2022-10-27 PROCEDURE — 2580000003 HC RX 258: Performed by: FAMILY MEDICINE

## 2022-10-27 PROCEDURE — 81001 URINALYSIS AUTO W/SCOPE: CPT

## 2022-10-27 PROCEDURE — 86645 CMV ANTIBODY IGM: CPT

## 2022-10-27 PROCEDURE — 82105 ALPHA-FETOPROTEIN SERUM: CPT

## 2022-10-27 PROCEDURE — 6370000000 HC RX 637 (ALT 250 FOR IP)

## 2022-10-27 PROCEDURE — 36415 COLL VENOUS BLD VENIPUNCTURE: CPT

## 2022-10-27 PROCEDURE — 82784 ASSAY IGA/IGD/IGG/IGM EACH: CPT

## 2022-10-27 PROCEDURE — 2060000000 HC ICU INTERMEDIATE R&B

## 2022-10-27 PROCEDURE — 6370000000 HC RX 637 (ALT 250 FOR IP): Performed by: FAMILY MEDICINE

## 2022-10-27 PROCEDURE — 6370000000 HC RX 637 (ALT 250 FOR IP): Performed by: NURSE PRACTITIONER

## 2022-10-27 PROCEDURE — 80053 COMPREHEN METABOLIC PANEL: CPT

## 2022-10-27 PROCEDURE — 86376 MICROSOMAL ANTIBODY EACH: CPT

## 2022-10-27 PROCEDURE — 2580000003 HC RX 258

## 2022-10-27 PROCEDURE — 85025 COMPLETE CBC W/AUTO DIFF WBC: CPT

## 2022-10-27 PROCEDURE — 87324 CLOSTRIDIUM AG IA: CPT

## 2022-10-27 RX ORDER — 0.9 % SODIUM CHLORIDE 0.9 %
500 INTRAVENOUS SOLUTION INTRAVENOUS ONCE
Status: COMPLETED | OUTPATIENT
Start: 2022-10-27 | End: 2022-10-27

## 2022-10-27 RX ORDER — CHOLESTYRAMINE LIGHT 4 G/5.7G
4 POWDER, FOR SUSPENSION ORAL 2 TIMES DAILY
Status: DISCONTINUED | OUTPATIENT
Start: 2022-10-27 | End: 2022-11-01 | Stop reason: HOSPADM

## 2022-10-27 RX ORDER — MIDODRINE HYDROCHLORIDE 5 MG/1
5 TABLET ORAL 3 TIMES DAILY
Status: DISCONTINUED | OUTPATIENT
Start: 2022-10-27 | End: 2022-10-27

## 2022-10-27 RX ORDER — MIDODRINE HYDROCHLORIDE 10 MG/1
10 TABLET ORAL 3 TIMES DAILY
Status: DISCONTINUED | OUTPATIENT
Start: 2022-10-27 | End: 2022-11-01 | Stop reason: HOSPADM

## 2022-10-27 RX ORDER — FLUDROCORTISONE ACETATE 0.1 MG/1
0.1 TABLET ORAL DAILY
Status: DISCONTINUED | OUTPATIENT
Start: 2022-10-27 | End: 2022-11-01 | Stop reason: HOSPADM

## 2022-10-27 RX ORDER — POTASSIUM CHLORIDE 20 MEQ/1
20 TABLET, EXTENDED RELEASE ORAL 2 TIMES DAILY WITH MEALS
Status: COMPLETED | OUTPATIENT
Start: 2022-10-27 | End: 2022-10-30

## 2022-10-27 RX ADMIN — SODIUM CHLORIDE, PRESERVATIVE FREE 10 ML: 5 INJECTION INTRAVENOUS at 21:26

## 2022-10-27 RX ADMIN — CLONAZEPAM 0.5 MG: 0.5 TABLET ORAL at 17:34

## 2022-10-27 RX ADMIN — INSULIN LISPRO 3 UNITS: 100 INJECTION, SOLUTION INTRAVENOUS; SUBCUTANEOUS at 17:22

## 2022-10-27 RX ADMIN — MIDODRINE HYDROCHLORIDE 10 MG: 10 TABLET ORAL at 21:26

## 2022-10-27 RX ADMIN — LEVOTHYROXINE SODIUM 100 MCG: 0.1 TABLET ORAL at 07:10

## 2022-10-27 RX ADMIN — SODIUM CHLORIDE 500 ML: 9 INJECTION, SOLUTION INTRAVENOUS at 02:30

## 2022-10-27 RX ADMIN — POTASSIUM CHLORIDE 20 MEQ: 1500 TABLET, EXTENDED RELEASE ORAL at 17:24

## 2022-10-27 RX ADMIN — SODIUM CHLORIDE, PRESERVATIVE FREE 10 ML: 5 INJECTION INTRAVENOUS at 09:43

## 2022-10-27 RX ADMIN — ALBUMIN (HUMAN) 25 G: 0.25 INJECTION, SOLUTION INTRAVENOUS at 11:38

## 2022-10-27 RX ADMIN — INSULIN LISPRO 4 UNITS: 100 INJECTION, SOLUTION INTRAVENOUS; SUBCUTANEOUS at 21:35

## 2022-10-27 RX ADMIN — SODIUM CHLORIDE 500 ML: 9 INJECTION, SOLUTION INTRAVENOUS at 16:06

## 2022-10-27 RX ADMIN — FLUDROCORTISONE ACETATE 0.1 MG: 0.1 TABLET ORAL at 17:23

## 2022-10-27 RX ADMIN — MIDODRINE HYDROCHLORIDE 5 MG: 5 TABLET ORAL at 14:11

## 2022-10-27 RX ADMIN — FERROUS SULFATE TAB 325 MG (65 MG ELEMENTAL FE) 325 MG: 325 (65 FE) TAB at 09:43

## 2022-10-27 RX ADMIN — POTASSIUM CHLORIDE 40 MEQ: 1500 TABLET, EXTENDED RELEASE ORAL at 09:43

## 2022-10-27 RX ADMIN — CHOLESTYRAMINE 4 G: 4 POWDER, FOR SUSPENSION ORAL at 21:26

## 2022-10-27 ASSESSMENT — ENCOUNTER SYMPTOMS
NAUSEA: 0
ABDOMINAL PAIN: 0
SHORTNESS OF BREATH: 0
BLOOD IN STOOL: 0
EYE REDNESS: 0
CHEST TIGHTNESS: 0
TROUBLE SWALLOWING: 0
COUGH: 0
EYE ITCHING: 0
DIARRHEA: 1
VOMITING: 0
COLOR CHANGE: 0

## 2022-10-27 ASSESSMENT — PAIN SCALES - GENERAL
PAINLEVEL_OUTOF10: 0
PAINLEVEL_OUTOF10: 0

## 2022-10-27 NOTE — PROGRESS NOTES
Progress Note    10/27/2022   3:21 PM    Name:  Willy Pemberton  MRN:    [id number]     Tosha:     [de-identified]   Room:  2108/2108-01   Day: 2     Admit Date: 10/25/2022 12:32 PM  PCP: DANDRE Calvert - [facility]    Subjective:     C/C:   Chief Complaint   Patient presents with    Fall     Hit head, no loc       Interval History: Status: not changed. Patient had 2 episodes of loose stools since last night. is refusing Creon. Patient had relatively low blood pressure readings received 500 mL normal saline bolus albumin 25 g IV. Currently patient blood pressure is relatively low with stable heart rate afebrile patient oxygen saturation is 97%. Recent labs reviewed potassium 3.3, hemoglobin 10, blood sugar readings stable    ROS:   all 10 systems reviewed and are negative except as noted    Review of Systems   Constitutional:  Positive for fatigue. Negative for chills. HENT:  Negative for drooling, mouth sores, sneezing and trouble swallowing. Eyes:  Negative for redness and itching. Respiratory:  Negative for cough, chest tightness and shortness of breath. Cardiovascular:  Negative for chest pain, palpitations and leg swelling. Gastrointestinal:  Positive for diarrhea (x3). Negative for abdominal pain, blood in stool, nausea and vomiting. Endocrine: Negative for heat intolerance and polyphagia. Genitourinary:  Negative for difficulty urinating, flank pain and pelvic pain. Musculoskeletal:  Negative for arthralgias, joint swelling and neck stiffness. Skin:  Negative for color change and pallor. Allergic/Immunologic: Negative for food allergies. Neurological:  Negative for dizziness, seizures and headaches. Hematological:  Does not bruise/bleed easily. Psychiatric/Behavioral:  Negative for agitation, behavioral problems and suicidal ideas. The patient is not hyperactive. Medications: Allergies:    Allergies   Allergen Reactions    Latex Rash    Pcn [Penicillins] Anaphylaxis Dilaudid [Hydromorphone] Other (See Comments)     Abnormal behaviors    Norco [Hydrocodone-Acetaminophen] Other (See Comments)     Abnormal behavior    Nitrofurantoin Palpitations    Sulfa Antibiotics Rash       Current Meds: midodrine (PROAMATINE) tablet 5 mg, TID  0.9 % sodium chloride bolus, Once  fludrocortisone (FLORINEF) tablet 0.1 mg, Daily  potassium chloride (KLOR-CON M) extended release tablet 20 mEq, BID WC  lipase-protease-amylase (ZENPEP) delayed release capsule 5,000 Units, TID WC  clonazePAM (KLONOPIN) tablet 0.5 mg, Daily PRN  ferrous sulfate (IRON 325) tablet 325 mg, Daily with breakfast  sodium chloride flush 0.9 % injection 10 mL, 2 times per day  sodium chloride flush 0.9 % injection 10 mL, PRN  0.9 % sodium chloride infusion, PRN  potassium chloride (KLOR-CON M) extended release tablet 40 mEq, PRN   Or  potassium bicarb-citric acid (EFFER-K) effervescent tablet 40 mEq, PRN   Or  potassium chloride 10 mEq/100 mL IVPB (Peripheral Line), PRN  magnesium sulfate 1000 mg in dextrose 5% 100 mL IVPB, PRN  ondansetron (ZOFRAN-ODT) disintegrating tablet 4 mg, Q8H PRN   Or  ondansetron (ZOFRAN) injection 4 mg, Q6H PRN  magnesium hydroxide (MILK OF MAGNESIA) 400 MG/5ML suspension 30 mL, Daily PRN  acetaminophen (TYLENOL) tablet 650 mg, Q6H PRN   Or  acetaminophen (TYLENOL) suppository 650 mg, Q6H PRN  ipratropium-albuterol (DUONEB) nebulizer solution 1 ampule, Q4H PRN  warfarin placeholder: dosing by pharmacy, RX Placeholder  levothyroxine (SYNTHROID) tablet 100 mcg, Daily  glucose chewable tablet 16 g, PRN  dextrose bolus 10% 125 mL, PRN   Or  dextrose bolus 10% 250 mL, PRN  glucagon (rDNA) injection 1 mg, PRN  dextrose 10 % infusion, Continuous PRN  insulin lispro (HUMALOG) injection vial 0-4 Units, TID WC  insulin lispro (HUMALOG) injection vial 0-4 Units, Nightly      Data:     Code Status:  Full Code    No family history on file.     Social History     Socioeconomic History    Marital status:  10/25/2022  No acute osseous abnormality in the bilateral hips or knees. Given the degree of osteopenia, nondisplaced fractures may be radiographically occult. If pain or concern for fracture persists, consider CT or MR imaging. CT HEAD WO CONTRAST    Result Date: 10/25/2022  No acute intracranial abnormality identified by CT. CT FACIAL BONES WO CONTRAST    Result Date: 10/25/2022  Acute nondisplaced nasal bone fractures bilaterally. CT CERVICAL SPINE WO CONTRAST    Result Date: 10/25/2022  No acute cervical spine fracture.        Labs:  Recent Results (from the past 24 hour(s))   POC Glucose Fingerstick    Collection Time: 10/26/22  8:25 PM   Result Value Ref Range    POC Glucose 185 (H) 65 - 105 mg/dL   Comprehensive Metabolic Panel w/ Reflex to MG    Collection Time: 10/27/22  4:49 AM   Result Value Ref Range    Glucose 136 (H) 70 - 99 mg/dL    BUN 9 8 - 23 mg/dL    Creatinine 0.70 0.50 - 0.90 mg/dL    Est, Glom Filt Rate >60 >60 mL/min/1.73m2    Calcium 6.8 (L) 8.6 - 10.4 mg/dL    Sodium 135 135 - 144 mmol/L    Potassium 3.3 (L) 3.7 - 5.3 mmol/L    Chloride 103 98 - 107 mmol/L    CO2 24 20 - 31 mmol/L    Anion Gap 8 (L) 9 - 17 mmol/L    Alkaline Phosphatase 177 (H) 35 - 104 U/L    ALT 59 (H) 5 - 33 U/L    AST 53 (H) <32 U/L    Total Bilirubin 0.4 0.3 - 1.2 mg/dL    Total Protein 4.4 (L) 6.4 - 8.3 g/dL    Albumin 1.9 (L) 3.5 - 5.2 g/dL   CBC auto differential    Collection Time: 10/27/22  4:49 AM   Result Value Ref Range    WBC 6.9 3.5 - 11.0 k/uL    RBC 3.60 (L) 4.0 - 5.2 m/uL    Hemoglobin 10.0 (L) 12.0 - 16.0 g/dL    Hematocrit 31.3 (L) 36 - 46 %    MCV 87.0 80 - 100 fL    MCH 27.9 26 - 34 pg    MCHC 32.0 31 - 37 g/dL    RDW 17.0 (H) 11.5 - 14.9 %    Platelets 063 802 - 433 k/uL    MPV 8.6 6.0 - 12.0 fL    Seg Neutrophils 56 36 - 66 %    Lymphocytes 28 24 - 44 %    Monocytes 14 (H) 1 - 7 %    Eosinophils % 1 0 - 4 %    Basophils 1 0 - 2 %    Segs Absolute 3.80 1.3 - 9.1 k/uL    Absolute Lymph # 1. 90 1.0 - 4.8 k/uL    Absolute Mono # 1.00 0.1 - 1.3 k/uL    Absolute Eos # 0.10 0.0 - 0.4 k/uL    Basophils Absolute 0.10 0.0 - 0.2 k/uL   Protime-INR    Collection Time: 10/27/22  4:49 AM   Result Value Ref Range    Protime 24.4 (H) 11.8 - 14.6 sec    INR 2.2    Magnesium    Collection Time: 10/27/22  4:49 AM   Result Value Ref Range    Magnesium 2.0 1.6 - 2.6 mg/dL   POC Glucose Fingerstick    Collection Time: 10/27/22  9:49 AM   Result Value Ref Range    POC Glucose 157 (H) 65 - 105 mg/dL   AFP Tumor Marker    Collection Time: 10/27/22  9:50 AM   Result Value Ref Range    AFP (Alpha Fetoprotein) 2.3 <8.4 ug/L   C DIFF TOXIN/ANTIGEN    Collection Time: 10/27/22 10:58 AM    Specimen: Stool   Result Value Ref Range    Specimen Description . FECES     C DIFF AG + TOXIN NEGATIVE NEGATIVE   POC Glucose Fingerstick    Collection Time: 10/27/22 10:59 AM   Result Value Ref Range    POC Glucose 164 (H) 65 - 105 mg/dL       Physical Examination:        Vitals:  BP (!) 80/44   Pulse 95   Temp 97.3 °F (36.3 °C) (Oral)   Resp 20   Ht 5' (1.524 m)   Wt 106 lb 0.7 oz (48.1 kg)   SpO2 97%   BMI 20.71 kg/m²   Temp (24hrs), Av.6 °F (36.4 °C), Min:97.3 °F (36.3 °C), Max:97.8 °F (36.6 °C)    Recent Labs     10/26/22  1507 10/26/22  2025 10/27/22  0949 10/27/22  1059   POCGLU 356* 185* 157* 164*         Physical Exam  Vitals reviewed. HENT:      Head: Normocephalic. Right Ear: External ear normal.      Left Ear: External ear normal.      Nose: Nose normal.      Mouth/Throat:      Mouth: Mucous membranes are moist.      Pharynx: Oropharynx is clear. Eyes:      Conjunctiva/sclera: Conjunctivae normal.   Cardiovascular:      Rate and Rhythm: Normal rate and regular rhythm. Pulses: Normal pulses. Heart sounds: Normal heart sounds. Pulmonary:      Effort: Pulmonary effort is normal.      Breath sounds: Normal breath sounds.    Abdominal:      General: Bowel sounds are normal.      Palpations: Abdomen is soft.   Musculoskeletal:         General: No deformity. Cervical back: Normal range of motion and neck supple. Right lower leg: No edema. Left lower leg: No edema. Skin:     General: Skin is warm. Capillary Refill: Capillary refill takes less than 2 seconds. Coloration: Skin is not jaundiced. Neurological:      General: No focal deficit present. Mental Status: She is alert. Mental status is at baseline. Psychiatric:         Mood and Affect: Mood normal.         Behavior: Behavior normal.       Assessment:        Primary Problem  Generalized weakness     Principal Problem:    Generalized weakness  Active Problems:    COPD (chronic obstructive pulmonary disease) (HCC)    Paroxysmal atrial fibrillation (HCC)    Iron deficiency anemia    Hypothyroidism    Closed fracture of nasal bone    Elevated LFTs    Diabetes mellitus (HCC)    Exocrine pancreatic insufficiency    Diarrhea    Anemia  Resolved Problems:    * No resolved hospital problems. *      Past Medical History:   Diagnosis Date    Atrial fibrillation ([facility] [address]     Hypertension     Thyroid disease         Plan:        IV normal saline bolus 500 mL now  Start Florinef 0.1 mg p.o. now  Start midodrine 5 mg p.o. 3 times a day   Jobst stockings bilateral knee-high  20-30 mmHg  Iron study shows iron deficiency anemia. Ferrous sulfate 325 mg p.o. daily  FOBT pending  Stool C. difficile is negative  Start KCl 20 M EQ p.o. twice daily  Continue Creon for exocrine pancreatic insufficiency. GI input noted   Check UA   Bilateral carotid Doppler report is normal    EKG shows normal sinus rhythm. 2D echo 10/13/2022 from care reviewed shows an ejection fraction of 55% with no aortic stenosis  Cardiology input noted   TSH elevated at 37.09, T4 1.02, increase Synthroid to 100 mcg p.o. daily   CT brain report, CT cervical report does not show any acute abnormality. CT facial shows acute nondisplaced nasal bone fracture bilaterally.   Referral made for ENT follow-up as outpatient   X-ray left elbow report, x-ray left hip report, x-ray right hip report x-ray left knee, x-ray right knee report does not show any acute abnormality   DVT prophylaxis on Coumadin INR therapeutic   Nutritional supplements 3 times a day  EPCs  PT/OT to evaluate and treat  Replace electrolytes as per sliding scale  Home medications reviewed and appropriate medications continued  Reviewed labs and imaging studies from last 24 hours and results explained to patient    Electronically signed by Sherly Singh MD

## 2022-10-27 NOTE — PLAN OF CARE
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 Our service is consulted for chronic diarrhea. she normally see's md's at the [facility] and dr Leah Todd. She was previously on creon at home but she developed nausea and severe diarrhea-(many times per day )and she states her creon dose was decreased to 6000 units with meals to see if her symptoms were improved but her diarrhea continued. She then completely stopped the creon on her own and has not had any in the last month or two. Prior to this admission she normally had 5-6 non bloody stools per day. She was not taking imodium. Over the last two days she has had \"many\" episodes of non bloody stool. No nausea or significant abdominal pain. Her lft's are also elevated this admission, she denies previous liver disease. The lft;s have been elevated since august of this year. She has not had recent abdominal imaging; her last abd ct on 8/3/22 showed   FINDINGS:     LOWER THORAX: Small left greater than right pleural effusions with   adjacent compressive atelectasis. LIVER: Noncirrhotic morphology. No focal hepatic lesions. Geographic   hypoattenuation in segment 4A, similar to prior. BILIARY SYSTEM: Status post cholecystectomy with hepaticojejunostomy. Expected small volume pneumobilia. No biliary dilation. PANCREAS: Status post total pancreatectomy. Intermediate attenuation 4.6   x 1.9 cm fluid collection in the resection bed. 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 ADRENAL GLANDS: No nodules. KIDNEYS / URETERS: No collecting system dilation, calculi, or solid   masses. BLADDER: Partially decompressed, limiting evaluation, but grossly   unremarkable.    PELVIC ORGANS: Normal.     BOWEL: Status post Gracie-en-Y neck supple and non tender without mass, no thyromegaly or thyroid nodules, no cervical lymphadenopathy   Pulmonary/Chest: clear to auscultation bilaterally- no wheezes, rales or rhonchi, normal air movement, no respiratory distress  Cardiovascular: normal rate, regular rhythm, normal S1 and S2, no murmurs, rubs, clicks or gallops, distal pulses intact, no carotid bruits  Abdomen: soft, non tender, non-distended, normal bowel sounds, no masses or organomegaly no ascites  Extremities: no cyanosis, clubbing or edema  Musculoskeletal: normal range of motion, no joint swelling, deformity or tenderness  Neurologic: no cranial nerve deficit and muscle strength normal    Assessment  Diarrhea s/p total pancreatectomy hx fo high grade dysplasia in IPMN, pt denies a diagnosis of malignancy  Elevated lft's  Anemia  Weight loss  Family hx of colon and liver cancer    Plan  Will d/w md  Liver w/u, pt will need imaging mri vs ct, will d/w md  Stool studies  Pt currently refusing to take zenpep   Formal gi consult to follow  . Kyaw Kiser, APRN - CNP

## 2022-10-27 NOTE — PLAN OF CARE
Problem: Discharge Planning  Goal: Discharge to home or other facility with appropriate resources  10/27/2022 0436 by Merlyn Plascencia RN  Outcome: Progressing     Problem: Safety - Adult  Goal: Free from fall injury  10/27/2022 0436 by Merlyn Plascencia RN  Outcome: Progressing  Note: No falls noted this shift. Patient ambulates with x1 staff assistance without difficulty. Bed kept in low position. Safe environment maintained. Bedside table & call light in reach. Uses call light appropriately when needing assistance. Problem: Cardiovascular - Adult  Goal: Maintains optimal cardiac output and hemodynamic stability  10/27/2022 0436 by Merlyn Plascencia RN  Outcome: Progressing  Note: Pt had a low BP 80/47. Pt received fluid bolus and BP increased to 104/48. Problem: Pain  Goal: Verbalizes/displays adequate comfort level or baseline comfort level  Outcome: Progressing  Note: Pt complained of pain in rt arm that subsided with time and elevation.       Problem: Nutrition Deficit:  Goal: Optimize nutritional status  10/26/2022 1719 by Cuca Campbell RN  Outcome: Not Progressing  Note: PO intake is fair and pt continues to demonstrate signs of overall poor nutrition AEB low albumin levels

## 2022-10-27 NOTE — FLOWSHEET NOTE
10/27/22 0038   Treatment Team Notification   Reason for Communication Abnormal vitals   Team Member Name Dr. Pravin Barragan Provider   Method of Communication Call   Response No new orders   Notification Time 0038     RN notified Dr. Debi Hassan of pt BP of 80/47. Dr. Debi Hassan stated Jordy Poor needs fluids\" and asked RN to call attending provider. No new orders at this time.

## 2022-10-27 NOTE — PROGRESS NOTES
Pharmacy Note  Warfarin Consult follow-up      Recent Labs     10/25/22  1253 10/26/22  0544 10/27/22  0449   INR 2.7 2.6 2.2     Recent Labs     10/25/22  1253 10/26/22  0544 10/27/22  0449   HGB 10.5* 10.7* 10.0*   HCT 32.0* 32.6* 31.3*    286 185       Significant Drug-Drug Interactions:  New warfarin drug-drug interactions: none   Discontinued drug-drug interactions: none   Current warfarin drug-drug interactions: synthroid       Date             INR        Dose given previous day  Dose scheduled for today  10/27/2022            2.2 (goal 2-3)       1.25 mg           1.25 mg   Notes:                   INR therapeutic at 2.2 (goal 2-3). Will continue with current dosing. Platelets and hgb stable. Daily PT/INR while inpatient.      Amina JarquinD, BCPS  10/27/2022 8:02 AM

## 2022-10-27 NOTE — PLAN OF CARE
Problem: Discharge Planning  Goal: Discharge to home or other facility with appropriate resources  10/27/2022 1657 by Danielito Vargas RN  Outcome: Progressing     Problem: Safety - Adult  Goal: Free from fall injury  10/27/2022 1657 by Dainelito Vargas RN  Outcome: Progressing     Problem: ABCDS Injury Assessment  Goal: Absence of physical injury  Outcome: Progressing     Problem: Cardiovascular - Adult  Goal: Maintains optimal cardiac output and hemodynamic stability  10/27/2022 1657 by Danielito Vargas RN  Outcome: Progressing     Problem: Chronic Conditions and Co-morbidities  Goal: Patient's chronic conditions and co-morbidity symptoms are monitored and maintained or improved  Outcome: Progressing     Problem: Nutrition Deficit:  Goal: Optimize nutritional status  Outcome: Progressing     Problem: Pain  Goal: Verbalizes/displays adequate comfort level or baseline comfort level  10/27/2022 1657 by Danielito Vargas RN  Outcome: Progressing     Problem: Skin/Tissue Integrity  Goal: Absence of new skin breakdown  Outcome: Progressing

## 2022-10-27 NOTE — PROGRESS NOTES
PT states that she has been at 5 different hospitals, yet no doctors can find out the cure. Writer held her hand and shared the story of [facility] [employer] healing a woman who was bleeding for 12 yrs and spent all her money to see doctors. She cried and said that she is scared. Writer recited some portion of Psalm, she said with tears that she remembers the Psalm. Pt appreciated the visit and welcomed the prayer. 10/27/22 1631   Encounter Summary   Encounter Overview/Reason  Spiritual/Emotional Needs   Service Provided For: Patient   Referral/Consult From: [facility]   Support System Children   Last Encounter  10/27/22   Complexity of Encounter High   Begin Time 1515   End Time  1533   Total Time Calculated 18 min   Spiritual/Emotional needs   Type Spiritual Support   Assessment/Intervention/Outcome   Assessment Anxious; Coping;Despair; Sad   Intervention Active listening;Discussed illness injury and its impact; Discussed belief system/Samaritan practices/jeannette;Life review/Legacy;Prayer (assurance of)/Nunda;Sustaining Presence/Ministry of presence   Outcome Acceptance;Comfort;Encouraged;Engaged in conversation;Peace; Receptive;Venting emotion

## 2022-10-27 NOTE — CARE COORDINATION
ONGOING DISCHARGE PLAN:    Patient is alert and oriented x4. Spoke with patient regarding discharge plan and patient confirms that plan is still to follow for SNF, [address] [place] [address] of [address] [place] [address]. Referral was sent today, will need Pre-Cert. Joaquín Solo is following. PT/OT on board. Rec SNF. Pt. Having low BP's today. Jobst stockings, delivered from [facility]. Pt. On Coumadin, INR today 2.2. Cardio following. Remains on IV Albumin. GI on board. Will continue to follow for additional discharge needs.     Electronically signed by Lani Pope RN on 10/27/2022 at 2:50 PM

## 2022-10-27 NOTE — PLAN OF CARE
Please have patient fill out online MRI screening form. Once the form is complete we will schedule the patient for their test. If there are any questions please call [phone]. The patient will need to be NPO for at least 6 hours and in hospital clothes for this exam.  Thanks. !

## 2022-10-27 NOTE — PROGRESS NOTES
Pt's son at bedside during report at beginning of shift. Pt's son asked when pt was receiving her dose of Klonopin, which was not yet ordered. Day RN Pari Hong attempted to explain the series of events necessary for medications to be ordered and approved. Son repeatedly interrupted and stated that he would \"give her meds from home\" if we could not administer them tonight. He became verbally aggressive and complained that the hospitals and doctors were incompetent and threatened again to give home meds without notifying staff. Son was not seen giving meds and left on his own shortly after. There are currently no signs that pt was given medication not prescribed.

## 2022-10-28 ENCOUNTER — APPOINTMENT (OUTPATIENT)
Dept: GENERAL RADIOLOGY | Age: 82
DRG: 439 | End: 2022-10-28
Payer: COMMERCIAL

## 2022-10-28 ENCOUNTER — APPOINTMENT (OUTPATIENT)
Dept: MRI IMAGING | Age: 82
DRG: 439 | End: 2022-10-28
Payer: COMMERCIAL

## 2022-10-28 LAB
ABSOLUTE EOS #: 0.1 K/UL (ref 0–0.4)
ABSOLUTE LYMPH #: 2.7 K/UL (ref 1–4.8)
ABSOLUTE MONO #: 1.1 K/UL (ref 0.1–1.3)
ALBUMIN SERPL-MCNC: 2.1 G/DL (ref 3.5–5.2)
ALP BLD-CCNC: 161 U/L (ref 35–104)
ALT SERPL-CCNC: 53 U/L (ref 5–33)
ANION GAP SERPL CALCULATED.3IONS-SCNC: 7 MMOL/L (ref 9–17)
ANTI DNA DOUBLE STRANDED: 0.7 IU/ML
ANTI-NUCLEAR ANTIBODY (ANA): NEGATIVE
AST SERPL-CCNC: 52 U/L
BASOPHILS # BLD: 1 % (ref 0–2)
BASOPHILS ABSOLUTE: 0.1 K/UL (ref 0–0.2)
BILIRUB SERPL-MCNC: 0.4 MG/DL (ref 0.3–1.2)
BUN BLDV-MCNC: 13 MG/DL (ref 8–23)
CALCIUM SERPL-MCNC: 7.4 MG/DL (ref 8.6–10.4)
CAMPYLOBACTER PCR: NORMAL
CHLORIDE BLD-SCNC: 103 MMOL/L (ref 98–107)
CO2: 25 MMOL/L (ref 20–31)
CREAT SERPL-MCNC: 1.05 MG/DL (ref 0.5–0.9)
E COLI ENTEROTOXIGENIC PCR: NORMAL
ENA ANTIBODIES SCREEN: 0.4 U/ML
EOSINOPHILS RELATIVE PERCENT: 1 % (ref 0–4)
GFR SERPL CREATININE-BSD FRML MDRD: 54 ML/MIN/1.73M2
GLUCOSE BLD-MCNC: 100 MG/DL (ref 65–105)
GLUCOSE BLD-MCNC: 101 MG/DL (ref 70–99)
GLUCOSE BLD-MCNC: 110 MG/DL (ref 65–105)
GLUCOSE BLD-MCNC: 125 MG/DL (ref 65–105)
GLUCOSE BLD-MCNC: 130 MG/DL (ref 65–105)
GLUCOSE BLD-MCNC: 151 MG/DL (ref 65–105)
GLUCOSE BLD-MCNC: 213 MG/DL (ref 65–105)
GLUCOSE BLD-MCNC: 316 MG/DL (ref 65–105)
GLUCOSE BLD-MCNC: 51 MG/DL (ref 65–105)
GLUCOSE BLD-MCNC: 60 MG/DL (ref 65–105)
GLUCOSE BLD-MCNC: 75 MG/DL (ref 65–105)
HCT VFR BLD CALC: 25 % (ref 36–46)
HEMOGLOBIN: 8.2 G/DL (ref 12–16)
INR BLD: 2.6
LACTIC ACID: 1.7 MMOL/L (ref 0.5–2.2)
LYMPHOCYTES # BLD: 36 % (ref 24–44)
MCH RBC QN AUTO: 27.7 PG (ref 26–34)
MCHC RBC AUTO-ENTMCNC: 33 G/DL (ref 31–37)
MCV RBC AUTO: 84 FL (ref 80–100)
MITOCHONDRIAL ANTIBODY: 1.3 U/ML (ref 0–4)
MONOCYTES # BLD: 15 % (ref 1–7)
PDW BLD-RTO: 16.5 % (ref 11.5–14.9)
PLATELET # BLD: 227 K/UL (ref 150–450)
PLESIOMONAS SHIGELLOIDES PCR: NORMAL
PMV BLD AUTO: 7.9 FL (ref 6–12)
POTASSIUM SERPL-SCNC: 3.7 MMOL/L (ref 3.7–5.3)
PROCALCITONIN: 0.08 NG/ML
PROTHROMBIN TIME: 27.4 SEC (ref 11.8–14.6)
RBC # BLD: 2.97 M/UL (ref 4–5.2)
SALMONELLA PCR: NORMAL
SEG NEUTROPHILS: 47 % (ref 36–66)
SEGMENTED NEUTROPHILS ABSOLUTE COUNT: 3.6 K/UL (ref 1.3–9.1)
SHIGATOXIN GENE PCR: NORMAL
SHIGELLA SP PCR: NORMAL
SODIUM BLD-SCNC: 135 MMOL/L (ref 135–144)
SPECIMEN DESCRIPTION: NORMAL
TOTAL PROTEIN: 4.1 G/DL (ref 6.4–8.3)
VIBRIO PCR: NORMAL
WBC # BLD: 7.6 K/UL (ref 3.5–11)
YERSINIA ENTEROCOLITICA PCR: NORMAL

## 2022-10-28 PROCEDURE — 2580000003 HC RX 258: Performed by: NURSE PRACTITIONER

## 2022-10-28 PROCEDURE — 97116 GAIT TRAINING THERAPY: CPT

## 2022-10-28 PROCEDURE — 36415 COLL VENOUS BLD VENIPUNCTURE: CPT

## 2022-10-28 PROCEDURE — P9047 ALBUMIN (HUMAN), 25%, 50ML: HCPCS | Performed by: INTERNAL MEDICINE

## 2022-10-28 PROCEDURE — 97110 THERAPEUTIC EXERCISES: CPT

## 2022-10-28 PROCEDURE — 87040 BLOOD CULTURE FOR BACTERIA: CPT

## 2022-10-28 PROCEDURE — 82947 ASSAY GLUCOSE BLOOD QUANT: CPT

## 2022-10-28 PROCEDURE — 85610 PROTHROMBIN TIME: CPT

## 2022-10-28 PROCEDURE — 6370000000 HC RX 637 (ALT 250 FOR IP)

## 2022-10-28 PROCEDURE — A9579 GAD-BASE MR CONTRAST NOS,1ML: HCPCS | Performed by: NURSE PRACTITIONER

## 2022-10-28 PROCEDURE — APPSS30 APP SPLIT SHARED TIME 16-30 MINUTES: Performed by: NURSE PRACTITIONER

## 2022-10-28 PROCEDURE — 6370000000 HC RX 637 (ALT 250 FOR IP): Performed by: FAMILY MEDICINE

## 2022-10-28 PROCEDURE — 99232 SBSQ HOSP IP/OBS MODERATE 35: CPT | Performed by: INTERNAL MEDICINE

## 2022-10-28 PROCEDURE — 71045 X-RAY EXAM CHEST 1 VIEW: CPT

## 2022-10-28 PROCEDURE — 6360000002 HC RX W HCPCS: Performed by: INTERNAL MEDICINE

## 2022-10-28 PROCEDURE — 83605 ASSAY OF LACTIC ACID: CPT

## 2022-10-28 PROCEDURE — 85025 COMPLETE CBC W/AUTO DIFF WBC: CPT

## 2022-10-28 PROCEDURE — 2580000003 HC RX 258: Performed by: FAMILY MEDICINE

## 2022-10-28 PROCEDURE — 6370000000 HC RX 637 (ALT 250 FOR IP): Performed by: NURSE PRACTITIONER

## 2022-10-28 PROCEDURE — 2060000000 HC ICU INTERMEDIATE R&B

## 2022-10-28 PROCEDURE — 6360000004 HC RX CONTRAST MEDICATION: Performed by: NURSE PRACTITIONER

## 2022-10-28 PROCEDURE — 76377 3D RENDER W/INTRP POSTPROCES: CPT

## 2022-10-28 PROCEDURE — 84145 PROCALCITONIN (PCT): CPT

## 2022-10-28 PROCEDURE — 97530 THERAPEUTIC ACTIVITIES: CPT

## 2022-10-28 PROCEDURE — 80053 COMPREHEN METABOLIC PANEL: CPT

## 2022-10-28 RX ORDER — 0.9 % SODIUM CHLORIDE 0.9 %
40 INTRAVENOUS SOLUTION INTRAVENOUS ONCE
Status: COMPLETED | OUTPATIENT
Start: 2022-10-28 | End: 2022-10-28

## 2022-10-28 RX ORDER — SODIUM CHLORIDE 0.9 % (FLUSH) 0.9 %
10 SYRINGE (ML) INJECTION PRN
Status: DISCONTINUED | OUTPATIENT
Start: 2022-10-28 | End: 2022-11-01 | Stop reason: HOSPADM

## 2022-10-28 RX ORDER — ALBUMIN (HUMAN) 12.5 G/50ML
25 SOLUTION INTRAVENOUS DAILY
Status: COMPLETED | OUTPATIENT
Start: 2022-10-28 | End: 2022-10-29

## 2022-10-28 RX ORDER — SODIUM CHLORIDE 9 MG/ML
INJECTION, SOLUTION INTRAVENOUS CONTINUOUS
Status: DISCONTINUED | OUTPATIENT
Start: 2022-10-28 | End: 2022-10-31

## 2022-10-28 RX ORDER — WARFARIN SODIUM 1 MG/1
0.5 TABLET ORAL
Status: COMPLETED | OUTPATIENT
Start: 2022-10-28 | End: 2022-10-28

## 2022-10-28 RX ADMIN — MIDODRINE HYDROCHLORIDE 10 MG: 10 TABLET ORAL at 07:52

## 2022-10-28 RX ADMIN — INSULIN LISPRO 1 UNITS: 100 INJECTION, SOLUTION INTRAVENOUS; SUBCUTANEOUS at 17:21

## 2022-10-28 RX ADMIN — MIDODRINE HYDROCHLORIDE 10 MG: 10 TABLET ORAL at 16:22

## 2022-10-28 RX ADMIN — GADOTERIDOL 10 ML: 279.3 INJECTION, SOLUTION INTRAVENOUS at 13:41

## 2022-10-28 RX ADMIN — CHOLESTYRAMINE 4 G: 4 POWDER, FOR SUSPENSION ORAL at 08:08

## 2022-10-28 RX ADMIN — PANCRELIPASE LIPASE, PANCRELIPASE PROTEASE, PANCRELIPASE AMYLASE 5000 UNITS: 5000; 17000; 24000 CAPSULE, DELAYED RELEASE ORAL at 17:19

## 2022-10-28 RX ADMIN — SODIUM CHLORIDE 40 ML: 9 INJECTION, SOLUTION INTRAVENOUS at 13:42

## 2022-10-28 RX ADMIN — MIDODRINE HYDROCHLORIDE 10 MG: 10 TABLET ORAL at 23:23

## 2022-10-28 RX ADMIN — INSULIN LISPRO 4 UNITS: 100 INJECTION, SOLUTION INTRAVENOUS; SUBCUTANEOUS at 23:24

## 2022-10-28 RX ADMIN — PANCRELIPASE LIPASE, PANCRELIPASE PROTEASE, PANCRELIPASE AMYLASE 5000 UNITS: 5000; 17000; 24000 CAPSULE, DELAYED RELEASE ORAL at 08:08

## 2022-10-28 RX ADMIN — SODIUM CHLORIDE, PRESERVATIVE FREE 10 ML: 5 INJECTION INTRAVENOUS at 08:10

## 2022-10-28 RX ADMIN — SODIUM CHLORIDE, PRESERVATIVE FREE 10 ML: 5 INJECTION INTRAVENOUS at 13:41

## 2022-10-28 RX ADMIN — DEXTROSE MONOHYDRATE 125 ML: 100 INJECTION, SOLUTION INTRAVENOUS at 05:19

## 2022-10-28 RX ADMIN — DEXTROSE MONOHYDRATE 125 ML: 100 INJECTION, SOLUTION INTRAVENOUS at 07:37

## 2022-10-28 RX ADMIN — POTASSIUM CHLORIDE 20 MEQ: 1500 TABLET, EXTENDED RELEASE ORAL at 07:51

## 2022-10-28 RX ADMIN — SODIUM CHLORIDE: 9 INJECTION, SOLUTION INTRAVENOUS at 11:08

## 2022-10-28 RX ADMIN — ALBUMIN (HUMAN) 25 G: 0.25 INJECTION, SOLUTION INTRAVENOUS at 17:40

## 2022-10-28 RX ADMIN — FERROUS SULFATE TAB 325 MG (65 MG ELEMENTAL FE) 325 MG: 325 (65 FE) TAB at 07:52

## 2022-10-28 RX ADMIN — WARFARIN SODIUM 0.5 MG: 1 TABLET ORAL at 18:36

## 2022-10-28 RX ADMIN — POTASSIUM CHLORIDE 20 MEQ: 1500 TABLET, EXTENDED RELEASE ORAL at 16:22

## 2022-10-28 RX ADMIN — CLONAZEPAM 0.5 MG: 0.5 TABLET ORAL at 16:30

## 2022-10-28 ASSESSMENT — ENCOUNTER SYMPTOMS
BLOOD IN STOOL: 0
TROUBLE SWALLOWING: 0
VOMITING: 0
EYE REDNESS: 0
EYE ITCHING: 0
CHEST TIGHTNESS: 0
NAUSEA: 0
COLOR CHANGE: 0
ABDOMINAL PAIN: 0
COUGH: 0
SHORTNESS OF BREATH: 0

## 2022-10-28 NOTE — PLAN OF CARE
Problem: Discharge Planning  Goal: Discharge to home or other facility with appropriate resources  10/28/2022 0428 by Concepcion Garcia RN  Outcome: Progressing     Problem: Safety - Adult  Goal: Free from fall injury  10/28/2022 0428 by Concepcion Garcia RN  Outcome: Progressing  Note: No falls noted this shift. Patient ambulates with x1 staff assistance without difficulty. Bed kept in low position. Safe environment maintained. Bedside table & call light in reach. Uses call light appropriately when needing assistance. Problem: Cardiovascular - Adult  Goal: Maintains optimal cardiac output and hemodynamic stability  10/28/2022 0428 by Concepcion Garcia RN  Outcome: Progressing  Note: Pt BP remained low throughout night but pt remained asymptomatic. Problem: Chronic Conditions and Co-morbidities  Goal: Patient's chronic conditions and co-morbidity symptoms are monitored and maintained or improved  10/28/2022 0428 by Concepcion Garcia RN  Outcome: Progressing  Note: New medications (see MAR) improving pt's symptoms of diarrhea     Problem: Pain  Goal: Verbalizes/displays adequate comfort level or baseline comfort level  10/28/2022 0428 by Concepcion Garcia RN  Outcome: Progressing  Note: No pain at this time. 0/10 pain scale. Problem: Skin/Tissue Integrity  Goal: Absence of new skin breakdown  Description: 1. Monitor for areas of redness and/or skin breakdown  2. Assess vascular access sites hourly  3. Every 4-6 hours minimum:  Change oxygen saturation probe site  4. Every 4-6 hours:  If on nasal continuous positive airway pressure, respiratory therapy assess nares and determine need for appliance change or resting period. 10/28/2022 0428 by Concepcion Garcia RN  Outcome: Progressing  Note: Multiple skin abnormalities noted upon assessment of pt. No new skin breakdown evident. Will continue to monitor for changes in skin integrity.

## 2022-10-28 NOTE — CARE COORDINATION
ONGOING DISCHARGE PLAN:    Patient is alert and oriented x4. Spoke with patient regarding discharge plan and patient confirms that plan is still to discharge to [address] [facility]     Diarrhea improving semi solid  Cholestyramine  Pt is refusing pancreatic enzyme  C-diff neg  MRI today     Will continue to follow for additional discharge needs.     Electronically signed by Adrianna Zarate RN on 10/28/2022 at 1:47 PM

## 2022-10-28 NOTE — PROGRESS NOTES
Progress Note    10/28/2022   4:30 PM    Name:  Malcom Naqvi  MRN:    [id number]     [place]:     [de-identified]   Room:  2108/2108-01   Day: 3     Admit Date: 10/25/2022 12:32 PM  PCP: DANDRE Fleming CNP    Subjective:     C/C:   Chief Complaint   Patient presents with    Fall     Hit head, no loc       Interval History: Status: not changed. Patient has increased generalized weakness. Patient had 3 soft bowel movements since yesterday. Vital signs reviewed relatively low blood pressure readings noted heart rate stable afebrile patient oxygen saturation is 99% on room air. Recent labs reviewed creatinine 1.05, hemoglobin 8.2 WBC 7.6, lactic acid 1.7, procalcitonin level 0.08. Chest x-ray report does not show any acute abnormality    ROS:   all 10 systems reviewed and are negative except as noted    Review of Systems   Constitutional:  Positive for fatigue. Negative for chills. HENT:  Negative for drooling, mouth sores, sneezing and trouble swallowing. Eyes:  Negative for redness and itching. Respiratory:  Negative for cough, chest tightness and shortness of breath. Cardiovascular:  Negative for chest pain, palpitations and leg swelling. Gastrointestinal:  Negative for abdominal pain, blood in stool, nausea and vomiting. Endocrine: Negative for heat intolerance and polyphagia. Genitourinary:  Negative for difficulty urinating, flank pain and pelvic pain. Musculoskeletal:  Negative for arthralgias, joint swelling and neck stiffness. Skin:  Negative for color change and pallor. Allergic/Immunologic: Negative for food allergies. Neurological:  Negative for dizziness, seizures and headaches. Hematological:  Does not bruise/bleed easily. Psychiatric/Behavioral:  Negative for agitation, behavioral problems and suicidal ideas. The patient is not hyperactive. Medications: Allergies:    Allergies   Allergen Reactions    Latex Rash    Pcn [Penicillins] Anaphylaxis Dilaudid [Hydromorphone] Other (See Comments)     Abnormal behaviors    Norco [Hydrocodone-Acetaminophen] Other (See Comments)     Abnormal behavior    Nitrofurantoin Palpitations    Sulfa Antibiotics Rash       Current Meds: warfarin (COUMADIN) tablet 0.5 mg, Once  0.9 % sodium chloride infusion, Continuous  sodium chloride flush 0.9 % injection 10 mL, PRN  albumin human 25 % IV solution 25 g, Daily  fludrocortisone (FLORINEF) tablet 0.1 mg, Daily  potassium chloride (KLOR-CON M) extended release tablet 20 mEq, BID WC  cholestyramine light packet 4 g, BID  midodrine (PROAMATINE) tablet 10 mg, TID  lipase-protease-amylase (ZENPEP) delayed release capsule 5,000 Units, TID WC  clonazePAM (KLONOPIN) tablet 0.5 mg, Daily PRN  ferrous sulfate (IRON 325) tablet 325 mg, Daily with breakfast  sodium chloride flush 0.9 % injection 10 mL, 2 times per day  sodium chloride flush 0.9 % injection 10 mL, PRN  0.9 % sodium chloride infusion, PRN  potassium chloride (KLOR-CON M) extended release tablet 40 mEq, PRN   Or  potassium bicarb-citric acid (EFFER-K) effervescent tablet 40 mEq, PRN   Or  potassium chloride 10 mEq/100 mL IVPB (Peripheral Line), PRN  magnesium sulfate 1000 mg in dextrose 5% 100 mL IVPB, PRN  ondansetron (ZOFRAN-ODT) disintegrating tablet 4 mg, Q8H PRN   Or  ondansetron (ZOFRAN) injection 4 mg, Q6H PRN  magnesium hydroxide (MILK OF MAGNESIA) 400 MG/5ML suspension 30 mL, Daily PRN  acetaminophen (TYLENOL) tablet 650 mg, Q6H PRN   Or  acetaminophen (TYLENOL) suppository 650 mg, Q6H PRN  ipratropium-albuterol (DUONEB) nebulizer solution 1 ampule, Q4H PRN  warfarin placeholder: dosing by pharmacy, RX Placeholder  levothyroxine (SYNTHROID) tablet 100 mcg, Daily  glucose chewable tablet 16 g, PRN  dextrose bolus 10% 125 mL, PRN   Or  dextrose bolus 10% 250 mL, PRN  glucagon (rDNA) injection 1 mg, PRN  dextrose 10 % infusion, Continuous PRN  insulin lispro (HUMALOG) injection vial 0-4 Units, TID WC  insulin lispro (HUMALOG) injection vial 0-4 Units, Nightly      Data:     Code Status:  Full Code    No family history on file. Social History     Socioeconomic History    Marital status:      Spouse name: Not on file    Number of children: Not on file    Years of education: Not on file    Highest education level: Not on file   Occupational History    Not on file   Tobacco Use    Smoking status: Never    Smokeless tobacco: Not on file   Substance and Sexual Activity    Alcohol use: No    Drug use: No    Sexual activity: Not on file   Other Topics Concern    Not on file   Social History Narrative    Not on file     Social Determinants of Health     Financial Resource Strain: Not on file   Food Insecurity: Not on file   Transportation Needs: Not on file   Physical Activity: Not on file   Stress: Not on file   Social Connections: Not on file   Intimate Partner Violence: Not on file   Housing Stability: Not on file       I/O (24Hr): Intake/Output Summary (Last 24 hours) at 10/28/2022 1630  Last data filed at 10/27/2022 1819  Gross per 24 hour   Intake 240 ml   Output --   Net 240 ml     Radiology:  XR ELBOW LEFT (MIN 3 VIEWS)    Result Date: 10/25/2022  1. Mild degenerative changes of the ulnohumeral joint. 2. No acute fracture or dislocation. XR HIP LEFT (2-3 VIEWS)    Result Date: 10/25/2022  No acute osseous abnormality in the bilateral hips or knees. Given the degree of osteopenia, nondisplaced fractures may be radiographically occult. If pain or concern for fracture persists, consider CT or MR imaging. XR HIP RIGHT (2-3 VIEWS)    Result Date: 10/25/2022  No acute osseous abnormality in the bilateral hips or knees. Given the degree of osteopenia, nondisplaced fractures may be radiographically occult. If pain or concern for fracture persists, consider CT or MR imaging. XR KNEE LEFT (3 VIEWS)    Result Date: 10/25/2022  No acute osseous abnormality in the bilateral hips or knees.   Given the degree of osteopenia, nondisplaced fractures may be radiographically occult. If pain or concern for fracture persists, consider CT or MR imaging. XR KNEE RIGHT (3 VIEWS)    Result Date: 10/25/2022  No acute osseous abnormality in the bilateral hips or knees. Given the degree of osteopenia, nondisplaced fractures may be radiographically occult. If pain or concern for fracture persists, consider CT or MR imaging. CT HEAD WO CONTRAST    Result Date: 10/25/2022  No acute intracranial abnormality identified by CT. CT FACIAL BONES WO CONTRAST    Result Date: 10/25/2022  Acute nondisplaced nasal bone fractures bilaterally. CT CERVICAL SPINE WO CONTRAST    Result Date: 10/25/2022  No acute cervical spine fracture.        Labs:  Recent Results (from the past 24 hour(s))   POC Glucose Fingerstick    Collection Time: 10/27/22  4:42 PM   Result Value Ref Range    POC Glucose 327 (H) 65 - 105 mg/dL   Occ Bld, Fecal Scrn    Collection Time: 10/27/22  4:52 PM   Result Value Ref Range    Occult Blood, Stool #1 NEGATIVE NEGATIVE    Date, Stool #1 [phone]     Time, Stool #1 Unknown    Urinalysis with Reflex to Culture    Collection Time: 10/27/22  4:54 PM    Specimen: Urine   Result Value Ref Range    Color, UA Dark Yellow (A) Yellow    Turbidity UA Clear Clear    Glucose, Ur MOD (A) NEGATIVE    Bilirubin Urine NEGATIVE NEGATIVE    Ketones, Urine SMALL (A) NEGATIVE    Specific Gravity, UA 1.031 (H) 1.000 - 1.030    Urine Hgb NEGATIVE NEGATIVE    pH, UA 5.5 5.0 - 8.0    Protein, UA TRACE (A) NEGATIVE    Urobilinogen, Urine Normal Normal    Nitrite, Urine NEGATIVE NEGATIVE    Leukocyte Esterase, Urine NEGATIVE NEGATIVE   Microscopic Urinalysis    Collection Time: 10/27/22  4:54 PM   Result Value Ref Range    WBC, UA 3 to 5 /HPF    RBC, UA 0 TO 2 /HPF    Casts UA HYALINE /LPF    Casts UA 6 TO 9 /LPF    Crystals, UA FEW (A) None /HPF    Crystals, UA CALCIUM OXALATE (A) None /HPF    Epithelial Cells UA 6 TO 9 /HPF    Bacteria, UA FEW (A) None   POC Glucose Fingerstick    Collection Time: 10/27/22  8:02 PM   Result Value Ref Range    POC Glucose 319 (H) 65 - 105 mg/dL   POC Glucose Fingerstick    Collection Time: 10/28/22  5:16 AM   Result Value Ref Range    POC Glucose 51 (L) 65 - 105 mg/dL   POC Glucose Fingerstick    Collection Time: 10/28/22  5:35 AM   Result Value Ref Range    POC Glucose 110 (H) 65 - 105 mg/dL   Comprehensive Metabolic Panel w/ Reflex to MG    Collection Time: 10/28/22  5:55 AM   Result Value Ref Range    Glucose 101 (H) 70 - 99 mg/dL    BUN 13 8 - 23 mg/dL    Creatinine 1.05 (H) 0.50 - 0.90 mg/dL    Est, Glom Filt Rate 54 (L) >60 mL/min/1.73m2    Calcium 7.4 (L) 8.6 - 10.4 mg/dL    Sodium 135 135 - 144 mmol/L    Potassium 3.7 3.7 - 5.3 mmol/L    Chloride 103 98 - 107 mmol/L    CO2 25 20 - 31 mmol/L    Anion Gap 7 (L) 9 - 17 mmol/L    Alkaline Phosphatase 161 (H) 35 - 104 U/L    ALT 53 (H) 5 - 33 U/L    AST 52 (H) <32 U/L    Total Bilirubin 0.4 0.3 - 1.2 mg/dL    Total Protein 4.1 (L) 6.4 - 8.3 g/dL    Albumin 2.1 (L) 3.5 - 5.2 g/dL   CBC auto differential    Collection Time: 10/28/22  5:55 AM   Result Value Ref Range    WBC 7.6 3.5 - 11.0 k/uL    RBC 2.97 (L) 4.0 - 5.2 m/uL    Hemoglobin 8.2 (L) 12.0 - 16.0 g/dL    Hematocrit 25.0 (L) 36 - 46 %    MCV 84.0 80 - 100 fL    MCH 27.7 26 - 34 pg    MCHC 33.0 31 - 37 g/dL    RDW 16.5 (H) 11.5 - 14.9 %    Platelets 983 285 - 033 k/uL    MPV 7.9 6.0 - 12.0 fL    Seg Neutrophils 47 36 - 66 %    Lymphocytes 36 24 - 44 %    Monocytes 15 (H) 1 - 7 %    Eosinophils % 1 0 - 4 %    Basophils 1 0 - 2 %    Segs Absolute 3.60 1.3 - 9.1 k/uL    Absolute Lymph # 2.70 1.0 - 4.8 k/uL    Absolute Mono # 1.10 0.1 - 1.3 k/uL    Absolute Eos # 0.10 0.0 - 0.4 k/uL    Basophils Absolute 0.10 0.0 - 0.2 k/uL   Protime-INR    Collection Time: 10/28/22  5:55 AM   Result Value Ref Range    Protime 27.4 (H) 11.8 - 14.6 sec    INR 2.6    Procalcitonin    Collection Time: 10/28/22  5:55 AM   Result Value Ref Range    Procalcitonin 0.08 <0.09 ng/mL   POC Glucose Fingerstick    Collection Time: 10/28/22  6:29 AM   Result Value Ref Range    POC Glucose 75 65 - 105 mg/dL   POC Glucose Fingerstick    Collection Time: 10/28/22  7:14 AM   Result Value Ref Range    POC Glucose 60 (L) 65 - 105 mg/dL   POC Glucose Fingerstick    Collection Time: 10/28/22  7:50 AM   Result Value Ref Range    POC Glucose 125 (H) 65 - 105 mg/dL   POC Glucose Fingerstick    Collection Time: 10/28/22  8:02 AM   Result Value Ref Range    POC Glucose 130 (H) 65 - 105 mg/dL   Culture, Blood 1    Collection Time: 10/28/22 10:23 AM    Specimen: Blood   Result Value Ref Range    Specimen Description . BLOOD     Culture NO GROWTH <24 HRS    Culture, Blood 1    Collection Time: 10/28/22 10:23 AM    Specimen: Blood   Result Value Ref Range    Specimen Description . BLOOD     Culture NO GROWTH <24 HRS    Lactic Acid    Collection Time: 10/28/22 10:24 AM   Result Value Ref Range    Lactic Acid 1.7 0.5 - 2.2 mmol/L   POC Glucose Fingerstick    Collection Time: 10/28/22 11:21 AM   Result Value Ref Range    POC Glucose 100 65 - 105 mg/dL       Physical Examination:        Vitals:  BP (!) 70/53   Pulse 82   Temp 97.3 °F (36.3 °C)   Resp 18   Ht 5' (1.524 m)   Wt 106 lb 0.7 oz (48.1 kg)   SpO2 99%   BMI 20.71 kg/m²   Temp (24hrs), Av.6 °F (36.4 °C), Min:97.3 °F (36.3 °C), Max:98.3 °F (36.8 °C)    Recent Labs     10/28/22  0714 10/28/22  0750 10/28/22  0802 10/28/22  1121   POCGLU 60* 125* 130* 100         Physical Exam  Vitals reviewed. HENT:      Head: Normocephalic. Right Ear: External ear normal.      Left Ear: External ear normal.      Nose: Nose normal.      Mouth/Throat:      Mouth: Mucous membranes are moist.      Pharynx: Oropharynx is clear. Eyes:      Conjunctiva/sclera: Conjunctivae normal.   Cardiovascular:      Rate and Rhythm: Normal rate and regular rhythm. Pulses: Normal pulses. Heart sounds: Normal heart sounds. Pulmonary:      Effort: Pulmonary effort is normal.      Breath sounds: Normal breath sounds. No wheezing or rales. Abdominal:      General: Bowel sounds are normal.      Palpations: Abdomen is soft. Tenderness: There is no abdominal tenderness. There is no right CVA tenderness or left CVA tenderness. Musculoskeletal:         General: No deformity. Cervical back: Normal range of motion and neck supple. Right lower leg: No edema. Left lower leg: No edema. Skin:     General: Skin is warm. Capillary Refill: Capillary refill takes less than 2 seconds. Coloration: Skin is not jaundiced. Neurological:      General: No focal deficit present. Mental Status: She is alert. Mental status is at baseline. Psychiatric:         Mood and Affect: Mood normal.         Behavior: Behavior normal.       Assessment:        Primary Problem  Generalized weakness     Principal Problem:    Generalized weakness  Active Problems:    COPD (chronic obstructive pulmonary disease) (HCC)    Paroxysmal atrial fibrillation (HCC)    Iron deficiency anemia    Hypothyroidism    Closed fracture of nasal bone    Elevated LFTs    Diabetes mellitus (HCC)    Exocrine pancreatic insufficiency    Diarrhea    Anemia    Frequent falls  Resolved Problems:    * No resolved hospital problems. *      Past Medical History:   Diagnosis Date    [facility]     Hypertension     Thyroid disease         Plan:        IV normal saline 75 mL/h  IV albumin 25 g daily for 2 days  Start Florinef 0.1 mg p.o. now  Increase midodrine 10  mg p.o. 3 times a day  Blood cultures x2.  UA for leukocyte esterase and nitrate   Jobst stockings bilateral knee-high  20-30 mmHg  Iron study shows iron deficiency anemia.   Ferrous sulfate 325 mg p.o. daily  MRI abdominal report shows fatty liver  Stool C. difficile is negative, GI pathogen panel is negative, FOBT is negative  Start KCl 20 M EQ p.o. twice daily  Continue Creon for exocrine pancreatic insufficiency. GI input noted. Cholestyramine twice daily   Bilateral carotid Doppler report is normal    EKG shows normal sinus rhythm. 2D echo 10/13/2022 from care reviewed shows an ejection fraction of 55% with no aortic stenosis  Cardiology input noted   TSH elevated at 37.09, T4 1.02, increase Synthroid to 100 mcg p.o. daily   CT brain report, CT cervical report does not show any acute abnormality. CT facial shows acute nondisplaced nasal bone fracture bilaterally.   Referral made for ENT follow-up as outpatient   X-ray left elbow report, x-ray left hip report, x-ray right hip report x-ray left knee, x-ray right knee report does not show any acute abnormality   DVT prophylaxis on Coumadin INR therapeutic   Nutritional supplements 3 times a day  EPCs  PT/OT to evaluate and treat  Replace electrolytes as per sliding scale  Home medications reviewed and appropriate medications continued  Reviewed labs and imaging studies from last 24 hours and results explained to patient    Electronically signed by Tegan Sen MD

## 2022-10-28 NOTE — DISCHARGE INSTR - COC
Continuity of Care Form    Patient Name: Ankit Wills   :  1940  MRN:  Ronald Strickland date:  10/25/2022  Discharge date:  22    Code Status Order: Full Code   Advance Directives:     Admitting Physician:  Ashlyn Guzman MD  PCP: Bonnie Worthington, APRN - CNP    Discharging Nurse: Shady Perez Unit/Room#: 2108/2108-01  Discharging Unit Phone Number: [phone]    Emergency Contact:   Extended Emergency Contact Information  Primary Emergency Contact: Bishop Martin  Address:   Home Phone: [phone]  Relation: Child  Secondary Emergency Contact: [address] [place] [address] Phone: [phone]  Relation: Child    Past Surgical History:  Past Surgical History:   Procedure Laterality Date    CARPAL TUNNEL RELEASE Bilateral     WHIPPLE PROCEDURE W/ LAPAROSCOPY N/A        Immunization History:   Immunization History   Administered Date(s) Administered    COVID-19, PFIZER PURPLE top, DILUTE for use, (age 15 y+), 30mcg/0.3mL 2021, 2021, 2021       Active Problems:  Patient Active Problem List   Diagnosis Code    Generalized weakness R53.1    COPD (chronic obstructive pulmonary disease) ([facility] J44.9    Paroxysmal atrial fibrillation (HCC) I48.0    Iron deficiency anemia D50.9    Hypothyroidism E03.9    Closed fracture of nasal bone S02. 2XXA    Elevated LFTs R79.89    Diabetes mellitus ([address] E11.9    Exocrine pancreatic insufficiency K86.81    Diarrhea R19.7    Anemia D64.9    Frequent falls R29.6       Isolation/Infection:   Isolation            No Isolation          Patient Infection Status       Infection Onset Added Last Indicated Last Indicated By Review Planned Expiration Resolved Resolved By    None active    Resolved    C-diff Rule Out 10/27/22 10/27/22 10/27/22 C DIFF TOXIN/ANTIGEN (Ordered)   10/27/22 Rule-Out Test Resulted            Nurse Assessment:  Last Vital Signs: BP (!) 88/42   Pulse 75   Temp 97.4 °F (36.3 °C) (Oral)   Resp 16   Ht 5' (1.524 m) Wt 106 lb 0.7 oz (48.1 kg)   SpO2 98%   BMI 20.71 kg/m²     Last documented pain score (0-10 scale): Pain Level: 0  Last Weight:   Wt Readings from Last 1 Encounters:   10/26/22 106 lb 0.7 oz (48.1 kg)     Mental Status:  oriented, alert, coherent, logical, thought processes intact, and able to concentrate and follow conversation    IV Access:  - None    Nursing Mobility/ADLs:  Walking   Independent  Transfer  Independent  Bathing  Independent  Dressing  Independent  [address] Delivery   none    Wound Care Documentation and Therapy:        Elimination:  Continence: Bowel: Yes  Bladder: Yes  Urinary Catheter: None   Colostomy/Ileostomy/Ileal Conduit: No       Date of Last BM: 11/1/22    Intake/Output Summary (Last 24 hours) at 10/28/2022 0829  Last data filed at 10/27/2022 1819  Gross per 24 hour   Intake 680 ml   Output --   Net 680 ml     I/O last 3 completed shifts: In: 65 [P.O.:680]  Out: -     Safety Concerns: At Risk for Falls    Impairments/Disabilities:      None    Nutrition Therapy:  Current Nutrition Therapy:   - Oral Diet:  Carb Control 4 carbs/meal (1800kcals/day)    Routes of Feeding: Oral  Liquids: No Restrictions  Daily Fluid Restriction: no  Last Modified Barium Swallow with Video (Video Swallowing Test): not done    Treatments at the Time of Hospital Discharge:   Respiratory Treatments: Duoneb q4h PRN  Oxygen Therapy:  is on oxygen at 3 L/min per nasal cannula. 4/5 L/min on exertion. Ventilator:    - No ventilator support    Rehab Therapies: Physical Therapy and Occupational Therapy  Weight Bearing Status/Restrictions: Other Medical Equipment (for information only, NOT a DME order): Other Treatments: Skilled Nursing assessment and monitoring. Medication education and monitoring per protocol.        Patient's personal belongings (please select all that are sent with patient):  Kyara    RN SIGNATURE:  Electronically signed by Yvonne Cheng RN on 11/1/22 at 1:46 PM EDT    CASE MANAGEMENT/SOCIAL WORK SECTION    Inpatient Status Date: 10/25/22    Readmission Risk Assessment Score:  Readmission Risk              Risk of Unplanned Readmission:  18           Discharging to Facility/ [address] Phone: [phone]  Fax: [phone]    Dialysis Facility (if applicable)   Name:  Address:  Dialysis Schedule:  Phone:  Fax:    / signature: Electronically signed by Richard Velasquez RN on 10/28/22 at 8:37 AM EDT    PHYSICIAN SECTION    Prognosis: Fair    Condition at Discharge: Stable    Rehab Potential (if transferring to Rehab): Fair    Recommended Labs or Other Treatments After Discharge: ENT follow up for nasal fracture    Physician Certification: I certify the above information and transfer of [address]  is necessary for the continuing treatment of the diagnosis listed and that she requires [facility] for less 30 days.      Update Admission H&P: No change in H&P    PHYSICIAN SIGNATURE:  Electronically signed by Mary Glover MD on 10/28/22 at 8:29 AM EDT

## 2022-10-28 NOTE — PROGRESS NOTES
[address] [facility]    OCCUPATIONAL THERAPY MISSED TREATMENT NOTE   INPATIENT   Date: 10/28/22  Patient Name: Mary Lou Livingston       Room:   MRN: [id number]   Account #: [de-identified]    : 1940  ([de-identified] y.o.)  Gender: female   Referring Practitioner: Javier Carballo MD  Diagnosis: Generalized weakness, multiple contusions, frequent falls, closed fracture of nasal bone             REASON FOR MISSED TREATMENT:  Attempt [id number]. Patient is tearful upon OT entry. Patient reports feelings of frustration with current medical journey including the current plans for testing, NPO status, and her current feeling of being \"shakey\" and her \"head feeling funny. \" RNs Fredy Perez and Rome Barba notified of patient's concerns and frustrations. RN Rome Barba present at end of attempt to assess manual BP. Patient declines OT treatment at this time.      Electronically signed by CHACE Rascon/L on 10/28/2022 at 10:58 AM

## 2022-10-28 NOTE — CONSULTS
Gastroenterology Consult Note      Patient: Ángel Daley  : 1940  Acct#:  [id number]     Date:  10/27/2022    Subjective:       History of Present Illness  Patient is a [de-identified] y.o.  female admitted with Generalized weakness [R53.1]  Multiple contusions [T07. XXXA]  Frequent falls [R29.6]  Closed fracture of nasal bone, initial encounter [S02. 2XXA] who is seen in consult for diarrhea    This elderly lady with a history of IPMN with high-grade dysplasia for which she underwent total pancreatectomy in 2022  She also had A. fib for which she is on Coumadin  Patient presented with fall  And broke her nose actually and she had a bruises on the right side of her face  She has severe diarrhea which been going on for few weeks since her surgery  She does follow with [address]. Jef Birch  She was on Creon at home  But she has some side effect of it with nausea  She think it would make her diarrhea worse  The dose was decreased and then it was stopped by her because she think it is making the diarrhea worse. She does have 5-6 nonbloody bowel movements    But in the last 2 days was worse  She had no fever no chills no nausea  No severe abdominal pain  She does have elevated liver enzyme on this admission  And living elevated since August  Did not have any imaging this admission but she had a CAT scan on 8/3/2022  ct on 8/3/22 showed   FINDINGS:     LOWER THORAX: Small left greater than right pleural effusions with   adjacent compressive atelectasis. LIVER: Noncirrhotic morphology. No focal hepatic lesions. Geographic   hypoattenuation in segment 4A, similar to prior. BILIARY SYSTEM: Status post cholecystectomy with hepaticojejunostomy. Expected small volume pneumobilia. No biliary dilation. PANCREAS: Status post total pancreatectomy. Intermediate attenuation 4.6   x 1.9 cm fluid collection in the resection bed.    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 ADRENAL GLANDS: No nodules. KIDNEYS / URETERS: No collecting system dilation, calculi, or solid   masses. BLADDER: Partially decompressed, limiting evaluation, but grossly   unremarkable. PELVIC ORGANS: Normal.     BOWEL: Status post Gracie-en-Y gastrojejunostomy. No bowel dilatation or   wall thickening. Small sliding hiatal hernia. PERITONEUM/RETROPERITONEUM: Small volume ascites in the paracolic   gutters and pelvis. Unorganized fluid in the garland hepatis. Mild diffuse   peritoneal thickening. Trace postoperative pneumoperitoneum. LYMPH NODES: Prominent mesenteric and para-aortic lymph nodes, likely   reactive. VESSELS: Normal caliber aorta with moderate atherosclerotic   calcification. The splenic vein and artery have been ligated. The portal   superior mesenteric veins are patent. BONES: No aggressive lesions. Degenerative changes of the spine. BODY WALL: Postoperative changes along the anterior abdominal wall. Mild   body wall and presacral edema. Labs show no leucocytosis hgb 10.0 inr 2.2 alk phos 177 alt 59 ast 53 bili is normal acute hepatitis panel is normal. Iron 87 with 77% saturation. She reports a 35# weight loss in the last several months. No fevers chills dysphagia melena hematemesis hematochezia rash. Endoscopy states she had colonoscopy 6 years ago that removed benign polyps-no report available. No egd.  Has had multiple EUS's of pancreas last on 6/6/22 showed high grade dysplasia    Family reports mom with colon and liver cancer, uncle with colon cancer no pancreatic or stomach cancer no uc/crohns  Social no smoking no etoh or illicit drugs           Past Medical History:   Diagnosis Date    Atrial fibrillation ([facility] [address]     Hypertension     Thyroid disease       Past Surgical History:   Procedure Laterality Date    CARPAL TUNNEL RELEASE Bilateral     WHIPPLE PROCEDURE W/ LAPAROSCOPY N/A       Past Endoscopic History  As above    Admission Meds  No current facility-administered medications on file prior to encounter. Current Outpatient Medications on File Prior to Encounter   Medication Sig Dispense Refill    furosemide (LASIX) 40 MG tablet Take 40 mg by mouth daily      pantoprazole (PROTONIX) 20 MG tablet Take 20 mg by mouth daily      lipase-protease-amylase (CREON) 6000-34774 units delayed release capsule Take 1 capsule by mouth 3 times daily (with meals)      potassium chloride (KLOR-CON M) 10 MEQ extended release tablet Take 20 mEq by mouth daily      insulin NPH (HUMULIN N;NOVOLIN N) 100 UNIT/ML injection vial Inject 5 Units into the skin every morning      insulin lispro, 1 Unit Dial, (HUMALOG KWIKPEN) 100 UNIT/ML SOPN Inject 1-4 Units into the skin 3 times daily (before meals)      ferrous sulfate (IRON 325) 325 (65 Fe) MG tablet Take 325 mg by mouth every other day      levothyroxine (SYNTHROID) 88 MCG tablet Take 88 mcg by mouth Daily      clonazePAM (KLONOPIN) 0.5 MG tablet Take 0.5 mg by mouth daily. warfarin (COUMADIN) 2.5 MG tablet Take 1.25 mg by mouth daily Per [facility]      metoprolol (TOPROL-XL) 50 MG XL tablet Take 50 mg by mouth in the morning and at bedtime Indications: ASH         Patient   Does Use ASA, NSAID No  Allergies  Allergies   Allergen Reactions    Latex Rash    Pcn [Penicillins] Anaphylaxis    Dilaudid [Hydromorphone] Other (See Comments)     Abnormal behaviors    Norco [Hydrocodone-Acetaminophen] Other (See Comments)     Abnormal behavior    Nitrofurantoin Palpitations    Sulfa Antibiotics Rash        Social   Social History     Tobacco Use    Smoking status: Never    Smokeless tobacco: Not on file   Substance Use Topics    Alcohol use: No        PSYCH HISTORY:  Depression No  Anxiety No  Suicide No       No family history on file. No family history of colon cancer, Crohn's disease, or ulcerative colitis.      Review of Systems  Constitutional: negative  Eyes: Bruising on the right side of the face and around the eye  Ears, nose, mouth, throat, and face: negative  Respiratory: negative  Cardiovascular: negative  Gastrointestinal: negative  Genitourinary:negative  Integument/breast: negative  Hematologic/lymphatic: negative  Musculoskeletal:negative  Endocrine: negative           Physical Exam  Blood pressure (!) 97/50, pulse (!) 109, temperature 97.6 °F (36.4 °C), temperature source Oral, resp. rate 18, height 5' (1.524 m), weight 106 lb 0.7 oz (48.1 kg), SpO2 94 %.          General Appearance: alert and oriented to person, place and time, well-developed and well-nourished, in no acute distress  Skin: warm and dry, no rash or erythema, ecchymosis on the right side of the face and around the  Head: normocephalic and atraumatic  Eyes: pupils equal, round, and reactive to light, extraocular eye movements intact, conjunctivae normal, the skin around the eye and the right side of the face is bruised  ENT: hearing grossly normal bilaterally  Neck: neck supple and non tender without mass, no thyromegaly or thyroid nodules, no cervical lymphadenopathy   Pulmonary/Chest: clear to auscultation bilaterally- no wheezes, rales or rhonchi, normal air movement, no respiratory distress  Cardiovascular: normal rate, regular rhythm, normal S1 and S2, no murmurs, rubs, clicks or gallops, distal pulses intact, no carotid bruits  Abdomen: soft, non-tender, non-distended, normal bowel sounds, no masses or organomegaly  Extremities: no cyanosis, clubbing or edema  Musculoskeletal: normal range of motion, no joint swelling, deformity or tenderness  Neurologic: no cranial nerve deficit and muscle strength normal    Data Review:    Recent Labs     10/25/22  1253 10/26/22  0544 10/27/22  0449   WBC 6.0 7.8 6.9   HGB 10.5* 10.7* 10.0*   HCT 32.0* 32.6* 31.3*   MCV 84.4 84.9 87.0    286 185     Recent Labs     10/25/22  1253 10/26/22  0544 10/27/22  0449   * 135 135   K 4.8 3.9 3.3*   CL 99 100 103   CO2 26 27 24   BUN 11 10 9   CREATININE 0.96* 0.86 0.70     Recent Labs     10/26/22  0544 10/27/22  0449   AST 74* 53*   ALT 73* 59*   BILITOT 0.4 0.4   ALKPHOS 194* 177*     No results for input(s): LIPASE, AMYLASE in the last 72 hours. Recent Labs     10/25/22  1253 10/26/22  0544 10/27/22  0449   PROTIME 28.6* 27.9* 24.4*   INR 2.7 2.6 2.2     No results for input(s): PTT in the last 72 hours. No results for input(s): OCCULTBLD in the last 72 hours. CEA:  No results found for: CEA  Ca 125:  No results found for:   Ca 19-9:  No results found for:   Ca 15-3:  No results found for:   AFP:  No components found for: AFAFP  Beta HCG:  No components found for: BHCG  Neuron Specific Enolase:  No results found for: NSE  Imaging Studies:                           All appropriate imaging studies and reports reviewed: No                 Assessment:     Principal Problem:    Generalized weakness  Active Problems:    COPD (chronic obstructive pulmonary disease) (HCC)    Paroxysmal atrial fibrillation (HCC)    Iron deficiency anemia    Hypothyroidism    Closed fracture of nasal bone    Elevated LFTs    Diabetes mellitus ([address]    Exocrine pancreatic insufficiency    Diarrhea    Anemia  Resolved Problems:    * No resolved hospital problems. 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   Please feel free to contact me with any questions or concerns.      Eleno Paez MD

## 2022-10-28 NOTE — PROGRESS NOTES
Spiritual care went to see the patient. The patient is very tearful about the removal of her pancreas and not understanding what diabetes is.

## 2022-10-28 NOTE — PLAN OF CARE
[facility]    Gastroenterology Daily Progress Note      Patient:   Lupillo Valentin   :    1940   Facility:   [facility]  Date:     10/28/2022  Consultant:   DANDRE Lopez CNP, CNP      SUBJECTIVE  [de-identified] y.o. female admitted 10/25/2022 with Generalized weakness [R53.1]  Multiple contusions [T07. XXXA]  Frequent falls [R29.6]  Closed fracture of nasal bone, initial encounter [S02. 2XXA] and seen for diarrhea, pancreatic insufficiency elevated lft's . The pt was seen and examined. Awaiting mri of abd. Lft's remain elevated, w/u ongoing. No c/o abdominal pain or nausea.          OBJECTIVE  Scheduled Meds:   warfarin  0.5 mg Oral Once    fludrocortisone  0.1 mg Oral Daily    potassium chloride  20 mEq Oral BID WC    cholestyramine light  4 g Oral BID    midodrine  10 mg Oral TID    lipase-protease-amylase  5,000 Units Oral TID WC    ferrous sulfate  325 mg Oral Daily with breakfast    sodium chloride flush  10 mL IntraVENous 2 times per day    warfarin placeholder: dosing by pharmacy   Other RX Placeholder    levothyroxine  100 mcg Oral Daily    insulin lispro  0-4 Units SubCUTAneous TID WC    insulin lispro  0-4 Units SubCUTAneous Nightly       Vital Signs:  BP (!) 88/42   Pulse 75   Temp 97.4 °F (36.3 °C) (Oral)   Resp 16   Ht 5' (1.524 m)   Wt 106 lb 0.7 oz (48.1 kg)   SpO2 98%   BMI 20.71 kg/m²    Review of Systems - History obtained from chart review and the patient  General ROS: positive for  - fatigue  Respiratory ROS: no cough, shortness of breath, or wheezing  Cardiovascular ROS: no chest pain or dyspnea on exertion  Gastrointestinal ROS: positive for - diarrhea     Physical Exam:     General Appearance: alert and oriented to person, place and time, well-developed and well-nourished, in no acute distress  Skin: warm and dry, no rash or erythema  Head: normocephalic and atraumatic eccymosis to face on the right  Eyes: pupils equal, round, and reactive to light, extraocular eye movements intact, conjunctivae normal  ENT: hearing grossly normal bilaterally  Neck: neck supple and non tender without mass, no thyromegaly or thyroid nodules, no cervical lymphadenopathy   Pulmonary/Chest: clear to auscultation bilaterally- no wheezes, rales or rhonchi, normal air movement, no respiratory distress  Cardiovascular: hypotensive normal rate, regular rhythm, normal S1 and S2, no murmurs, rubs, clicks or gallops, distal pulses intact, no carotid bruits  Abdomen: soft, non-tender, non-distended, normal bowel sounds, no masses or organomegaly  Extremities: no cyanosis, clubbing or edema  Musculoskeletal: normal range of motion, no joint swelling, deformity or tenderness  Neurologic: no cranial nerve deficit and muscle strength normal    Lab and Imaging Review     CBC  Recent Labs     10/26/22  0544 10/27/22  0449 10/28/22  0555   WBC 7.8 6.9 7.6   HGB 10.7* 10.0* 8.2*   HCT 32.6* 31.3* 25.0*   MCV 84.9 87.0 84.0    185 227       BMP  Recent Labs     10/26/22  0544 10/27/22  0449 10/28/22  0555    135 135   K 3.9 3.3* 3.7    103 103   CO2 27 24 25   BUN 10 9 13   CREATININE 0.86 0.70 1.05*   GLUCOSE 137* 136* 101*   CALCIUM 7.0* 6.8* 7.4*       LFTS  Recent Labs     10/26/22  0544 10/27/22  0449 10/28/22  0555   ALKPHOS 194* 177* 161*   ALT 73* 59* 53*   AST 74* 53* 52*   PROT 4.9* 4.4* 4.1*   BILITOT 0.4 0.4 0.4   LABALBU 2.3* 1.9* 2.1*         PT/INR  Recent Labs     10/26/22  0544 10/27/22  0449 10/28/22  0555   PROTIME 27.9* 24.4* 27.4*   INR 2.6 2.2 2.6         ANEMIA STUDIES  Recent Labs     10/25/22  1253 10/26/22  0544   LABIRON 77*  --    TIBC 113*  --    [id number]  --  1087   FOLATE  --  18.9      Latest Reference Range & Units 10/27/22 09:50   AFP (Alpha Fetoprotein) <8.4 ug/L 2.3      Latest Reference Range & Units 10/27/22 09:50   Ceruloplasmin 16 - 45 mg/dL 7 (L)   (L): Data is abnormally low   Latest Reference Range & Units 10/27/22 09:50   A-1 Antitrypsin 90 - 200 mg/dL 75 (L)   (L): Data is abnormally low   Latest Reference Range & Units 10/26/22 05:44   Hep A IgM NONREACTIVE  NONREACTIVE   Hepatitis B Surface Ag NONREACTIVE  NONREACTIVE   Hepatitis C Ab NONREACTIVE  NONREACTIVE   Hep B Core Ab, IgM NONREACTIVE  NONREACTIVE     ct on 8/3/22 showed   FINDINGS:     LOWER THORAX: Small left greater than right pleural effusions with   adjacent compressive atelectasis. LIVER: Noncirrhotic morphology. No focal hepatic lesions. Geographic   hypoattenuation in segment 4A, similar to prior. BILIARY SYSTEM: Status post cholecystectomy with hepaticojejunostomy. Expected small volume pneumobilia. No biliary dilation. PANCREAS: Status post total pancreatectomy. Intermediate attenuation 4.6   x 1.9 cm fluid collection in the resection bed. 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 ADRENAL GLANDS: No nodules. KIDNEYS / URETERS: No collecting system dilation, calculi, or solid   masses. BLADDER: Partially decompressed, limiting evaluation, but grossly   unremarkable. PELVIC ORGANS: Normal.     BOWEL: Status post Gracie-en-Y gastrojejunostomy. No bowel dilatation or   wall thickening. Small sliding hiatal hernia. PERITONEUM/RETROPERITONEUM: Small volume ascites in the paracolic   gutters and pelvis. Unorganized fluid in the garland hepatis. Mild diffuse   peritoneal thickening. Trace postoperative pneumoperitoneum. LYMPH NODES: Prominent mesenteric and para-aortic lymph nodes, likely   reactive. VESSELS: Normal caliber aorta with moderate atherosclerotic   calcification. The splenic vein and artery have been ligated. The portal   superior mesenteric veins are patent. BONES: No aggressive lesions. Degenerative changes of the spine. BODY WALL: Postoperative changes along the anterior abdominal wall. Mild   body wall and presacral edema.        ASSESSMENT/plan  Diarrhea likely to post cholecystectomy syndrome and pancreatic insufficiency, Status post total pancreatectomy because of high-grade dysplasia and IPMN  -pt stopped her creon at home and refused yesterdays dose, did take today  C diff is negative and stool cultures are pending  Cholestyramine  ppi    2.elevated lft  Mri today  Liver ongoing    Time spent reviewing chart assessing pt and d/w md around 30 mminutes      This plan was formulated in collaboration with Dr. Sridevi Hopkins  . Electronically signed by: DANDRE Valerio CNP on 10/28/2022 at 9:21 AM     Attending Physician Statement          I have discussed the care of Kaia Nunez and   I have examined the patient myselft independently, and taken ros and hpi , including pertinent history and exam findings,  with the author of this note . I have reviewed the key elements of all parts of the encounter with the nurse practitioner/resident. I agree with the assessment, plan and orders as documented by the above health care provider     More than 50% of the time on this visit was spent by me   hysical Exam  Blood pressure (!) 88/42, pulse 75, temperature 97.4 °F (36.3 °C), temperature source Oral, resp. rate 16, height 5' (1.524 m), weight 106 lb 0.7 oz (48.1 kg), SpO2 98 %.          General Appearance: alert and oriented to person, place and time, well-developed and well-nourished, in no acute distress  Skin: warm and dry, no rash or erythema  Head: normocephalic and atraumatic  Eyes: pupils equal, round, and reactive to light, extraocular eye movements intact, conjunctivae normal  ENT: hearing grossly normal bilaterally  Neck: neck supple and non tender without mass, no thyromegaly or thyroid nodules, no cervical lymphadenopathy   Pulmonary/Chest: clear to auscultation bilaterally- no wheezes, rales or rhonchi, normal air movement, no respiratory distress  Cardiovascular: normal rate, regular rhythm, normal S1 and S2, no murmurs, rubs, clicks or gallops, distal pulses intact, no carotid bruits  Abdomen: soft, non-tender, non-distended, normal bowel sounds, no masses or organomegaly  Extremities: no cyanosis, clubbing or edema  Musculoskeletal: normal range of motion, no joint swelling, deformity or tenderness  Neurologic: no cranial nerve deficit and muscle strength normal    Data Review:    Recent Labs     10/26/22  0544 10/27/22  0449 10/28/22  0555   WBC 7.8 6.9 7.6   HGB 10.7* 10.0* 8.2*   HCT 32.6* 31.3* 25.0*   MCV 84.9 87.0 84.0    185 227     Recent Labs     10/26/22  0544 10/27/22  0449 10/28/22  0555    135 135   K 3.9 3.3* 3.7    103 103   CO2 27 24 25   BUN 10 9 13   CREATININE 0.86 0.70 1.05*     Recent Labs     10/26/22  0544 10/27/22  0449 10/28/22  0555   AST 74* 53* 52*   ALT 73* 59* 53*   BILITOT 0.4 0.4 0.4   ALKPHOS 194* 177* 161*     No results for input(s): LIPASE, AMYLASE in the last 72 hours. Recent Labs     10/26/22  0544 10/27/22  0449 10/28/22  0555   PROTIME 27.9* 24.4* 27.4*   INR 2.6 2.2 2.6     No results for input(s): PTT in the last 72 hours. No results for input(s): OCCULTBLD in the last 72 hours.   CEA:  No results found for: CEA  Ca 125:  No results found for:   Ca 19-9:  No results found for:   Ca 15-3:  No results found for:   AFP:  No components found for: AFAFP  Beta HCG:  No components found for: BHCG  Neuron Specific Enolase:  No results found for: NSE      Additional :    Mild improvement in the diarrhea she said her stool is little bit darker right now and semisolid  2 bowel movements only today  Seems to be responding to cholestyramine we will continue with that  She is refusing the pancreatic enzyme  C. difficile is negative  Because of the elevation the LFTs  Await MRI results from today        Electronically signed by Shellie Khoury MD

## 2022-10-28 NOTE — PROGRESS NOTES
training;Verbal cues  Sit to Stand: Contact-guard assistance;Stand-by assistance  Stand to Sit: Stand-by assistance  Gait Training  Gait Training: Yes  Gait  Overall Level of Assistance: Contact-guard assistance (CGA for safety)  Interventions: Verbal cues  Base of Support: Narrowed  Speed/Jesenia: Slow  Step Length: Left shortened;Right shortened  Distance (ft): 47 Feet (10', 47'x2, 15)  Assistive Device: Walker, rolling     PT Exercises  Resistive Exercises: Seated BLE Ankle pumps, marches, LAQ 2x10 using 2lb ankle weight; Rest breaks provided prn Cues for form/technique to maximize strengthening benefit         [facility] Mobility Inpatient   How much difficulty turning over in bed?: A Little  How much difficulty sitting down on / standing up from a chair with arms?: A Little  How much difficulty moving from lying on back to sitting on side of bed?: A Little  How much help from another person moving to and from a bed to a chair?: A Little  How much help from another person needed to walk in hospital room?: A Little  How much help from another person for climbing 3-5 steps with a railing?: A Lot  AM-PAC Inpatient Mobility Raw Score : 17  AM-PAC Inpatient T-Scale Score : 42.13  Mobility Inpatient CMS 0-100% Score: 50.57  Mobility Inpatient CMS G-Code Modifier : CK      Goals  Short Term Goals  Time Frame for Short Term Goals: 5-7 treatments/ week  Short Term Goal 1: pt to demonstrate independent bed mobility on a flat surface  Short Term Goal 2: pt to demonstrate MODIFIED independent transfers using wheeled walker  Short Term Goal 3: pt to demonstrate MODIFIED independent gait 150-200' using wheeled walker for community distances  Short Term Goal 4: pt to demonstrate good ambulatory distance using wheeled walker  Short Term Goal 5: pt to demonstrate ability to ascend/ descend 2 steps w/o rail w/ SBA x 1 for home entry  Short Term Goal 6: pt to demonstrate good technique for exercise program for balance activities and general strengthening exercises  Patient Goals   Patient Goals : return home    Education  Patient Education  Education Given To: Patient  Education Provided: Plan of Care;Energy Conservation;Transfer Training  Education Method: Verbal  Education Outcome: Verbalized understanding;Demonstrated understanding;Continued education needed    Therapy Time   Individual Concurrent Group Co-treatment   Time In 1442         Time Out 1530         Minutes 48         Timed Code Treatment Minutes: Milagro Serrano 9, PT

## 2022-10-28 NOTE — PROGRESS NOTES
Pharmacy Note  Warfarin Consult follow-up      Recent Labs     10/26/22  0544 10/27/22  0449 10/28/22  0555   INR 2.6 2.2 2.6     Recent Labs     10/26/22  0544 10/27/22  0449 10/28/22  0555   HGB 10.7* 10.0* 8.2*   HCT 32.6* 31.3* 25.0*    185 227       Significant Drug-Drug Interactions:  New warfarin drug-drug interactions: none  Discontinued drug-drug interactions:  none  Current warfarin drug-drug interactions: Levothyroxine      Date             INR        Dose given previous day  Dose scheduled for today  10/28/2022      2.6       Missed by TAMY Marsh     0.5 mg        Notes:     Pharmacy note was to give 1.25 mg last night but  then order was not entered but actually serendipitous error since the  INR went from 2.2 to 2.6 without any dose yesterday. Will drop dose to 0.5 mg today. Daily PT/INR while inpatient. Yee Castaneda. Ph.  10/28/2022  8:44 AM

## 2022-10-28 NOTE — PROGRESS NOTES
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 Galina Engel; facilitated communication between Dr. Galina Engel and patient; patient told Ghassan Mae she has a hard time understanding and hearing \"people who have accents\"; patient stated she was interested in receiving information regarding any outpatient services available to help her manage her diabetes; writer spoke to patient's nurse Fredy Perez, in this regard; listening presence and support; welcomed prayer;      10/28/22 4865   Encounter Summary   Encounter Overview/Reason  Spiritual/Emotional Needs   Service Provided For: Patient   Referral/Consult From: Nursing Supervisor/Manager; Other    Support System Children   Last Encounter  10/28/22   Complexity of Encounter Moderate   Begin Time 1645   End Time  1715   Total Time Calculated 30 min   Spiritual/Emotional needs   Type Spiritual Support   Grief, Loss, and Adjustments   Type Grief and loss; Life Adjustments; Adjustment to illness   Assessment/Intervention/Outcome   Assessment Anxious; Hopeful;Powerlessness;Sad;Complicated grieving;Coping   Intervention Active listening;Discussed illness injury and its impact; Explored/Affirmed feelings, thoughts, concerns;Explored Coping Skills/Resources;Grief Care;Prayer (assurance of)/[place];Sustaining Presence/Ministry of presence   Outcome Comfort;Coping;Engaged in conversation;Expressed feelings, needs, and concerns;Expressed Gratitude;Grieving;Receptive

## 2022-10-28 NOTE — PROGRESS NOTES
Date:   10/28/2022  Patient name: Acosta Giles  Date of admission:  10/25/2022 12:32 PM  MRN:   [id number]  YOB: 1940  PCP: DANDRE Alvarez CNP    Reason for Admission: Generalized weakness [R53.1]  Multiple contusions [T07. XXXA]  Frequent falls [R29.6]  Closed fracture of nasal bone, initial encounter [S02. 2XXA]    Cardiology follow-up: History of paroxysmal A. 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  History of cholecystectomy     CT facial bones without contrast showed acute nondisplaced nasal bone fracture bilaterally  CT brain showed no acute intracranial abnormality  CT cervical spine showed no acute cervical spine fracture     2D echo 10/13/2022  Ejection fraction 55 to 60%  Trace aortic regurgitation mild to moderate mitral regurgitation  Right ventricular systolic pressure 30 mmHg     ECG 8/6/2022 normal sinus rhythm heart rate 77     Venous Doppler lower extremities 10/13/2022 no DVT    History of present illness  Brooke Dinh is a 79-year-old female who lives alone, normally independent in her activities of daily living, drives car , who came to the ER status post fall. Patient said she was walking and she tripped over her cane and she fell onto her knees and then her face and she sustained a cut into her eyebrows. No loss of consciousness. She did mention she has been having dizziness over the last couple of weeks. She is having persistent diarrhea she has lost about 30 pounds weight.   She had a total pancreatectomy in July 2022     On admission blood pressure was 120/50, heart rate 70 oxygen saturation 99%, temperature 37.1     Lab work on admission  INR 2.7  WBC 6.0, hemoglobin 10.5, platelets 413, MCV 85  TSH 37.09, free thyroxine 1.02  Sodium 133, potassium 4.8, BUN 11, creatinine 0.96, glucose 185, albumin 2.3     Current evaluation  Patient seen and examined, discussed with the patient's nurse  As per patient's nurse report no diarrhea today  ECG monitor sinus rhythm  She is a still running low blood pressure  She is on fludrocortisone and midodrine    Sodium 135, potassium 3.7, creatinine 1.05, BUN 13, albumin 2.1  Hemoglobin 8.2, INR 2.6    Medications:   Scheduled Meds:   warfarin  0.5 mg Oral Once    albumin human  25 g IntraVENous Daily    fludrocortisone  0.1 mg Oral Daily    potassium chloride  20 mEq Oral BID WC    cholestyramine light  4 g Oral BID    midodrine  10 mg Oral TID    lipase-protease-amylase  5,000 Units Oral TID WC    ferrous sulfate  325 mg Oral Daily with breakfast    sodium chloride flush  10 mL IntraVENous 2 times per day    warfarin placeholder: dosing by pharmacy   Other RX Placeholder    levothyroxine  100 mcg Oral Daily    insulin lispro  0-4 Units SubCUTAneous TID WC    insulin lispro  0-4 Units SubCUTAneous Nightly     Continuous Infusions:   sodium chloride 75 mL/hr at 10/28/22 1108    sodium chloride      dextrose       CBC:   Recent Labs     10/26/22  0544 10/27/22  0449 10/28/22  0555   WBC 7.8 6.9 7.6   HGB 10.7* 10.0* 8.2*    185 227     BMP:    Recent Labs     10/26/22  0544 10/27/22  0449 10/28/22  0555    135 135   K 3.9 3.3* 3.7    103 103   CO2 27 24 25   BUN 10 9 13   CREATININE 0.86 0.70 1.05*   GLUCOSE 137* 136* 101*     Hepatic:   Recent Labs     10/26/22  0544 10/27/22  0449 10/28/22  0555   AST 74* 53* 52*   ALT 73* 59* 53*   BILITOT 0.4 0.4 0.4   ALKPHOS 194* 177* 161*     Troponin: No results for input(s): TROPONINI in the last 72 hours.  BNP: No results for input(s): BNP in the last 72 hours. Lipids: No results for input(s): CHOL, HDL in the last 72 hours. Invalid input(s): LDLCALCU  INR:   Recent Labs     10/26/22  0544 10/27/22  0449 10/28/22  0555   INR 2.6 2.2 2.6       Objective:   Vitals: BP (!) 70/53   Pulse 82   Temp 97.3 °F (36.3 °C)   Resp 18   Ht 5' (1.524 m)   Wt 106 lb 0.7 oz (48.1 kg)   SpO2 99%   BMI 20.71 kg/m²   General appearance: alert and cooperative with exam  HEENT: Head: Normal, normocephalic, atraumatic. Neck: supple, symmetrical, trachea midline  Lungs: diminished breath sounds bibasilar  Heart: regular rate and rhythm  Abdomen:  Soft bowel sounds present  Extremities:  Mild edema bilateral  Neurologic: Mental status: Alert, oriented, thought content appropriate    EKG: normal sinus rhythm, low voltage artifacts. ECHO: reviewed.    Ejection fraction: 60%    Assessment / Acute Cardiac Problems:   Patient admitted status post fall no loss of consciousness  History of paroxysmal A. fib at present in sinus rhythm  Normal LV systolic function  Peripheral edema most likely combination of hypoproteinemia and anemia  Chronic diarrhea weight loss  Malnutrition  Status post total pancreatectomy       Patient Active Problem List:     Generalized weakness     COPD (chronic obstructive pulmonary disease) (HCC)     Paroxysmal atrial fibrillation (HCC)     Iron deficiency anemia     Hypothyroidism     Closed fracture of nasal bone     Elevated LFTs     Diabetes mellitus ([address]     Exocrine pancreatic insufficiency     Diarrhea     Anemia     Frequent falls      Plan of Treatment:   Medications reviewed  Continue with current dose of fludrocortisone, midodrine  Patient well-hydrated  Give 25 g albumin today and tomorrow IV      Electronically signed by Douglas Huff MD on 10/28/2022 at 4:41 PM

## 2022-10-29 LAB
ABSOLUTE EOS #: 0 K/UL (ref 0–0.4)
ABSOLUTE LYMPH #: 2.15 K/UL (ref 1–4.8)
ABSOLUTE MONO #: 0.61 K/UL (ref 0.1–1.3)
ANION GAP SERPL CALCULATED.3IONS-SCNC: 6 MMOL/L (ref 9–17)
BASOPHILS # BLD: 1 % (ref 0–2)
BASOPHILS ABSOLUTE: 0.06 K/UL (ref 0–0.2)
BILIRUBIN URINE: NEGATIVE
BUN BLDV-MCNC: 12 MG/DL (ref 8–23)
CALCIUM SERPL-MCNC: 7.4 MG/DL (ref 8.6–10.4)
CHLORIDE BLD-SCNC: 106 MMOL/L (ref 98–107)
CO2: 24 MMOL/L (ref 20–31)
COLOR: YELLOW
COMMENT UA: ABNORMAL
CREAT SERPL-MCNC: 0.91 MG/DL (ref 0.5–0.9)
EOSINOPHILS RELATIVE PERCENT: 0 % (ref 0–4)
GFR SERPL CREATININE-BSD FRML MDRD: >60 ML/MIN/1.73M2
GLUCOSE BLD-MCNC: 127 MG/DL (ref 65–105)
GLUCOSE BLD-MCNC: 155 MG/DL (ref 70–99)
GLUCOSE BLD-MCNC: 221 MG/DL (ref 65–105)
GLUCOSE BLD-MCNC: 276 MG/DL (ref 65–105)
GLUCOSE BLD-MCNC: 350 MG/DL (ref 65–105)
GLUCOSE URINE: ABNORMAL
HCT VFR BLD CALC: 22.1 % (ref 36–46)
HEMOGLOBIN: 7.3 G/DL (ref 12–16)
INR BLD: 2.6
KETONES, URINE: ABNORMAL
LEUKOCYTE ESTERASE, URINE: NEGATIVE
LYMPHOCYTES # BLD: 39 % (ref 24–44)
MCH RBC QN AUTO: 27.6 PG (ref 26–34)
MCHC RBC AUTO-ENTMCNC: 33 G/DL (ref 31–37)
MCV RBC AUTO: 83.9 FL (ref 80–100)
MONOCYTES # BLD: 11 % (ref 1–7)
MORPHOLOGY: NORMAL
NITRITE, URINE: NEGATIVE
PDW BLD-RTO: 16.6 % (ref 11.5–14.9)
PH UA: 5 (ref 5–8)
PLATELET # BLD: 218 K/UL (ref 150–450)
PMV BLD AUTO: 7.6 FL (ref 6–12)
POTASSIUM SERPL-SCNC: 3.8 MMOL/L (ref 3.7–5.3)
PROTEIN UA: NEGATIVE
PROTHROMBIN TIME: 27.5 SEC (ref 11.8–14.6)
RBC # BLD: 2.64 M/UL (ref 4–5.2)
SEG NEUTROPHILS: 49 % (ref 36–66)
SEGMENTED NEUTROPHILS ABSOLUTE COUNT: 2.68 K/UL (ref 1.3–9.1)
SMOOTH MUSCLE ANTIBODY: 16 UNITS (ref 0–19)
SODIUM BLD-SCNC: 136 MMOL/L (ref 135–144)
SPECIFIC GRAVITY UA: 1.02 (ref 1–1.03)
TURBIDITY: CLEAR
URINE HGB: NEGATIVE
UROBILINOGEN, URINE: NORMAL
WBC # BLD: 5.5 K/UL (ref 3.5–11)

## 2022-10-29 PROCEDURE — 6370000000 HC RX 637 (ALT 250 FOR IP): Performed by: INTERNAL MEDICINE

## 2022-10-29 PROCEDURE — 6360000002 HC RX W HCPCS: Performed by: INTERNAL MEDICINE

## 2022-10-29 PROCEDURE — 6370000000 HC RX 637 (ALT 250 FOR IP): Performed by: FAMILY MEDICINE

## 2022-10-29 PROCEDURE — 6370000000 HC RX 637 (ALT 250 FOR IP): Performed by: NURSE PRACTITIONER

## 2022-10-29 PROCEDURE — P9047 ALBUMIN (HUMAN), 25%, 50ML: HCPCS | Performed by: INTERNAL MEDICINE

## 2022-10-29 PROCEDURE — APPSS30 APP SPLIT SHARED TIME 16-30 MINUTES: Performed by: NURSE PRACTITIONER

## 2022-10-29 PROCEDURE — 2580000003 HC RX 258: Performed by: FAMILY MEDICINE

## 2022-10-29 PROCEDURE — 80048 BASIC METABOLIC PNL TOTAL CA: CPT

## 2022-10-29 PROCEDURE — 99232 SBSQ HOSP IP/OBS MODERATE 35: CPT | Performed by: INTERNAL MEDICINE

## 2022-10-29 PROCEDURE — 85025 COMPLETE CBC W/AUTO DIFF WBC: CPT

## 2022-10-29 PROCEDURE — 36415 COLL VENOUS BLD VENIPUNCTURE: CPT

## 2022-10-29 PROCEDURE — 6370000000 HC RX 637 (ALT 250 FOR IP)

## 2022-10-29 PROCEDURE — 82947 ASSAY GLUCOSE BLOOD QUANT: CPT

## 2022-10-29 PROCEDURE — 81003 URINALYSIS AUTO W/O SCOPE: CPT

## 2022-10-29 PROCEDURE — 2060000000 HC ICU INTERMEDIATE R&B

## 2022-10-29 PROCEDURE — 85610 PROTHROMBIN TIME: CPT

## 2022-10-29 PROCEDURE — 82525 ASSAY OF COPPER: CPT

## 2022-10-29 RX ORDER — WARFARIN SODIUM 1 MG/1
0.5 TABLET ORAL
Status: COMPLETED | OUTPATIENT
Start: 2022-10-29 | End: 2022-10-29

## 2022-10-29 RX ORDER — CLONAZEPAM 0.5 MG/1
0.5 TABLET ORAL DAILY PRN
Qty: 5 TABLET | Refills: 0 | Status: SHIPPED | OUTPATIENT
Start: 2022-10-29 | End: 2022-11-03

## 2022-10-29 RX ADMIN — PANCRELIPASE LIPASE, PANCRELIPASE PROTEASE, PANCRELIPASE AMYLASE 10000 UNITS: 5000; 17000; 24000 CAPSULE, DELAYED RELEASE ORAL at 16:45

## 2022-10-29 RX ADMIN — METOPROLOL TARTRATE 25 MG: 25 TABLET, FILM COATED ORAL at 23:35

## 2022-10-29 RX ADMIN — CHOLESTYRAMINE 4 G: 4 POWDER, FOR SUSPENSION ORAL at 08:27

## 2022-10-29 RX ADMIN — ALBUMIN (HUMAN) 25 G: 0.25 INJECTION, SOLUTION INTRAVENOUS at 14:34

## 2022-10-29 RX ADMIN — CHOLESTYRAMINE 4 G: 4 POWDER, FOR SUSPENSION ORAL at 20:51

## 2022-10-29 RX ADMIN — CLONAZEPAM 0.5 MG: 0.5 TABLET ORAL at 17:01

## 2022-10-29 RX ADMIN — INSULIN LISPRO 4 UNITS: 100 INJECTION, SOLUTION INTRAVENOUS; SUBCUTANEOUS at 16:42

## 2022-10-29 RX ADMIN — FERROUS SULFATE TAB 325 MG (65 MG ELEMENTAL FE) 325 MG: 325 (65 FE) TAB at 08:27

## 2022-10-29 RX ADMIN — MIDODRINE HYDROCHLORIDE 10 MG: 10 TABLET ORAL at 08:27

## 2022-10-29 RX ADMIN — FLUDROCORTISONE ACETATE 0.1 MG: 0.1 TABLET ORAL at 08:30

## 2022-10-29 RX ADMIN — PANCRELIPASE LIPASE, PANCRELIPASE PROTEASE, PANCRELIPASE AMYLASE 5000 UNITS: 5000; 17000; 24000 CAPSULE, DELAYED RELEASE ORAL at 08:30

## 2022-10-29 RX ADMIN — SODIUM CHLORIDE, PRESERVATIVE FREE 10 ML: 5 INJECTION INTRAVENOUS at 20:52

## 2022-10-29 RX ADMIN — WARFARIN SODIUM 0.5 MG: 1 TABLET ORAL at 16:38

## 2022-10-29 RX ADMIN — POTASSIUM CHLORIDE 20 MEQ: 1500 TABLET, EXTENDED RELEASE ORAL at 16:41

## 2022-10-29 RX ADMIN — SODIUM CHLORIDE, PRESERVATIVE FREE 10 ML: 5 INJECTION INTRAVENOUS at 08:30

## 2022-10-29 RX ADMIN — POTASSIUM CHLORIDE 20 MEQ: 1500 TABLET, EXTENDED RELEASE ORAL at 08:26

## 2022-10-29 RX ADMIN — LEVOTHYROXINE SODIUM 100 MCG: 0.1 TABLET ORAL at 06:46

## 2022-10-29 RX ADMIN — MIDODRINE HYDROCHLORIDE 10 MG: 10 TABLET ORAL at 14:32

## 2022-10-29 RX ADMIN — SODIUM CHLORIDE: 9 INJECTION, SOLUTION INTRAVENOUS at 02:34

## 2022-10-29 NOTE — PROGRESS NOTES
GI Progress notes    10/29/2022   1:33 PM    Name:  Haylee Gleason  MRN:    [id number]     [place]:     [de-identified]   Room:  [address] [place], [place] [address] Day: 4     Admit Date: 10/25/2022 12:32 PM  PCP: DANDRE Rhodes CNP    Subjective:     C/C:   Chief Complaint   Patient presents with    Fall     Hit head, no loc       Interval History: Status: improved. Patient seen and examined. No acute events overnight. Patient tolerating diet well. Reports 3 loose bowel movements so far this morning. Foul-smelling. Patient refused PERT in the past due to diarrhea? MRI MRCP showed mild hepatic steatosis, no biliary ductal dilatation. Status post cholecystectomy. No evidence of mass or abnormality in the pancreatectomy bed    No repeat liver battery today  ROS:  Constitutional: negative for chills, fevers and sweats  Gastrointestinal: negative for abdominal pain, constipation, diarrhea, nausea and vomiting  Neurological: negative for dizziness and headaches    Medications: Allergies:    Allergies   Allergen Reactions    Latex Rash    Pcn [Penicillins] Anaphylaxis    Dilaudid [Hydromorphone] Other (See Comments)     Abnormal behaviors    Norco [Hydrocodone-Acetaminophen] Other (See Comments)     Abnormal behavior    Nitrofurantoin Palpitations    Sulfa Antibiotics Rash       Current Meds: warfarin (COUMADIN) tablet 0.5 mg, Once  0.9 % sodium chloride infusion, Continuous  sodium chloride flush 0.9 % injection 10 mL, PRN  albumin human 25 % IV solution 25 g, Daily  fludrocortisone (FLORINEF) tablet 0.1 mg, Daily  potassium chloride (KLOR-CON M) extended release tablet 20 mEq, BID WC  cholestyramine light packet 4 g, BID  midodrine (PROAMATINE) tablet 10 mg, TID  lipase-protease-amylase (ZENPEP) delayed release capsule 5,000 Units, TID WC  clonazePAM (KLONOPIN) tablet 0.5 mg, Daily PRN  ferrous sulfate (IRON 325) tablet 325 mg, Daily with breakfast  sodium chloride flush 0.9 % injection 10 mL, 2 times per day  sodium chloride flush 0.9 % injection 10 mL, PRN  0.9 % sodium chloride infusion, PRN  potassium chloride (KLOR-CON M) extended release tablet 40 mEq, PRN   Or  potassium bicarb-citric acid (EFFER-K) effervescent tablet 40 mEq, PRN   Or  potassium chloride 10 mEq/100 mL IVPB (Peripheral Line), PRN  magnesium sulfate 1000 mg in dextrose 5% 100 mL IVPB, PRN  ondansetron (ZOFRAN-ODT) disintegrating tablet 4 mg, Q8H PRN   Or  ondansetron (ZOFRAN) injection 4 mg, Q6H PRN  magnesium hydroxide (MILK OF MAGNESIA) 400 MG/5ML suspension 30 mL, Daily PRN  acetaminophen (TYLENOL) tablet 650 mg, Q6H PRN   Or  acetaminophen (TYLENOL) suppository 650 mg, Q6H PRN  ipratropium-albuterol (DUONEB) nebulizer solution 1 ampule, Q4H PRN  warfarin placeholder: dosing by pharmacy, RX Placeholder  levothyroxine (SYNTHROID) tablet 100 mcg, Daily  glucose chewable tablet 16 g, PRN  dextrose bolus 10% 125 mL, PRN   Or  dextrose bolus 10% 250 mL, PRN  glucagon (rDNA) injection 1 mg, PRN  dextrose 10 % infusion, Continuous PRN  insulin lispro (HUMALOG) injection vial 0-4 Units, TID WC  insulin lispro (HUMALOG) injection vial 0-4 Units, Nightly        Data:     Code Status:  Full Code    No family history on file.     Social History     Socioeconomic History    Marital status:      Spouse name: Not on file    Number of children: Not on file    Years of education: Not on file    Highest education level: Not on file   Occupational History    Not on file   Tobacco Use    Smoking status: Never    Smokeless tobacco: Not on file   Substance and Sexual Activity    Alcohol use: No    Drug use: No    Sexual activity: Not on file   Other Topics Concern    Not on file   Social History Narrative    Not on file     Social Determinants of Health     Financial Resource Strain: Not on file   Food Insecurity: Not on file   Transportation Needs: Not on file   Physical Activity: Not on file   Stress: Not on file   Social Connections: Not on file Intimate Partner Violence: Not on file   Housing Stability: Not on file       Vitals:  BP (!) 109/57   Pulse 90   Temp 97.6 °F (36.4 °C) (Oral)   Resp 16   Ht 5' (1.524 m)   Wt 112 lb 3.4 oz (50.9 kg)   SpO2 96%   BMI 21.92 kg/m²   Temp (24hrs), Av.4 °F (36.3 °C), Min:97.3 °F (36.3 °C), Max:97.6 °F (36.4 °C)    Recent Labs     10/28/22  1603 10/28/22  2106 10/29/22  0644 10/29/22  1116   POCGLU 213* 316* 127* 221*       I/O (24Hr):     Intake/Output Summary (Last 24 hours) at 10/29/2022 1333  Last data filed at 10/29/2022 1240  Gross per 24 hour   Intake 240 ml   Output --   Net 240 ml       Labs:      CBC:   Lab Results   Component Value Date/Time    WBC 5.5 10/29/2022 04:56 AM    RBC 2.64 10/29/2022 04:56 AM    RBC 4.42 2012 01:10 PM    HGB 7.3 10/29/2022 04:56 AM    HCT 22.1 10/29/2022 04:56 AM    MCV 83.9 10/29/2022 04:56 AM    MCH 27.6 10/29/2022 04:56 AM    MCHC 33.0 10/29/2022 04:56 AM    RDW 16.6 10/29/2022 04:56 AM     10/29/2022 04:56 AM     2012 01:10 PM    MPV 7.6 10/29/2022 04:56 AM     CBC with Differential:    Lab Results   Component Value Date/Time    WBC 5.5 10/29/2022 04:56 AM    RBC 2.64 10/29/2022 04:56 AM    RBC 4.42 2012 01:10 PM    HGB 7.3 10/29/2022 04:56 AM    HCT 22.1 10/29/2022 04:56 AM     10/29/2022 04:56 AM     2012 01:10 PM    MCV 83.9 10/29/2022 04:56 AM    MCH 27.6 10/29/2022 04:56 AM    MCHC 33.0 10/29/2022 04:56 AM    RDW 16.6 10/29/2022 04:56 AM    LYMPHOPCT 39 10/29/2022 04:56 AM    MONOPCT 11 10/29/2022 04:56 AM    BASOPCT 1 10/29/2022 04:56 AM    MONOSABS 0.61 10/29/2022 04:56 AM    LYMPHSABS 2.15 10/29/2022 04:56 AM    EOSABS 0.00 10/29/2022 04:56 AM    BASOSABS 0.06 10/29/2022 04:56 AM    DIFFTYPE NOT REPORTED 2017 04:50 PM     Hemoglobin/Hematocrit:    Lab Results   Component Value Date/Time    HGB 7.3 10/29/2022 04:56 AM    HCT 22.1 10/29/2022 04:56 AM     CMP:    Lab Results   Component Value Date/Time  10/29/2022 04:56 AM    K 3.8 10/29/2022 04:56 AM     10/29/2022 04:56 AM    CO2 24 10/29/2022 04:56 AM    BUN 12 10/29/2022 04:56 AM    CREATININE 0.91 10/29/2022 04:56 AM    GFRAA >60 08/11/2022 09:05 AM    LABGLOM >60 10/29/2022 04:56 AM    GLUCOSE 155 10/29/2022 04:56 AM    PROT 4.1 10/28/2022 05:55 AM    LABALBU 2.1 10/28/2022 05:55 AM    CALCIUM 7.4 10/29/2022 04:56 AM    BILITOT 0.4 10/28/2022 05:55 AM    ALKPHOS 161 10/28/2022 05:55 AM    AST 52 10/28/2022 05:55 AM    ALT 53 10/28/2022 05:55 AM     BMP:    Lab Results   Component Value Date/Time     10/29/2022 04:56 AM    K 3.8 10/29/2022 04:56 AM     10/29/2022 04:56 AM    CO2 24 10/29/2022 04:56 AM    BUN 12 10/29/2022 04:56 AM    LABALBU 2.1 10/28/2022 05:55 AM    CREATININE 0.91 10/29/2022 04:56 AM    CALCIUM 7.4 10/29/2022 04:56 AM    GFRAA >60 08/11/2022 09:05 AM    LABGLOM >60 10/29/2022 04:56 AM    GLUCOSE 155 10/29/2022 04:56 AM     PT/INR:    Lab Results   Component Value Date/Time    PROTIME 27.5 10/29/2022 04:56 AM    INR 2.6 10/29/2022 04:56 AM     PTT:    Lab Results   Component Value Date/Time    APTT 35.1 10/25/2022 12:53 PM   [APTT}    Physical Examination:        General appearance: alert, cooperative and no distress  Mental Status: oriented to person, place and time and normal affect  Lungs: clear to auscultation bilaterally, normal effort  Heart: regular rate and rhythm, no murmur,  Abdomen: soft, nontender, nondistended, bowel sounds present all four quadrants, no masses, hepatomegaly or splenomegaly  Extremities: no edema, redness or tenderness in the calves  Skin: no gross lesions, rashes, or induration    Assessment:        Primary Problem  Generalized weakness     Active Hospital Problems    Diagnosis Date Noted    Diarrhea [R19.7] 10/27/2022     Priority: Medium    Anemia [D64.9] 10/27/2022     Priority: Medium    Frequent falls [R29.6] 10/27/2022     Priority: Medium    Elevated LFTs [R79.89] 10/26/2022 Priority: Medium    Diabetes mellitus ([facility] [E11.9] 10/26/2022     Priority: Medium    Exocrine pancreatic insufficiency [K86.81] 10/26/2022     Priority: Medium    Generalized weakness [R53.1] 10/25/2022     Priority: Medium    Iron deficiency anemia [D50.9] 10/25/2022     Priority: Medium    Hypothyroidism [E03.9] 10/25/2022     Priority: Medium    Closed fracture of nasal bone [S02. 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 Liver disease work-up ongoing  Ceruloplasmin low  24 urine copper    Time spent reviewing the chart, seeing the patient, and discussing with the attending MD around 30 minutes. Explained to the patient and d/W Nursing Staff  Will F/U with you  Please call or Page for any issues or change in status  Thanks    Electronically signed by DANDRE Medellin NP on 10/29/2022 at 1:33 PM       GI attending physician note. Patient seen with DANDRE     I independently performed an evaluation on [id number]. I have reviewed the above documentation completed by the Nurse Practitioner and agree with the history, examination, and management plan. Recommendations discussed. Patient chart reviewed. Patient had total pancreatectomy her cancer of the pancreas. Patient was supposed to be on pancreatic enzymes, she is not taking. Patient imaging studies in the past did not reveal her colon was loaded with stool.   At present she is having 3-4 bowel movements large amount, foul odor. At present abdominal examination benign. Advised pancreatic supplements. Also we will keep her on PPI therapy. Will order pancreatic elastase, stool for fat. If stable may be followed as an outpatient.

## 2022-10-29 NOTE — PLAN OF CARE
Problem: Discharge Planning  Goal: Discharge to home or other facility with appropriate resources  Outcome: Progressing     Problem: Safety - Adult  Goal: Free from fall injury  Outcome: Progressing     Problem: ABCDS Injury Assessment  Goal: Absence of physical injury  Outcome: Progressing     Problem: Cardiovascular - Adult  Goal: Maintains optimal cardiac output and hemodynamic stability  Outcome: Progressing     Problem: Cardiovascular - Adult  Goal: Absence of cardiac dysrhythmias or at baseline  Outcome: Progressing     Problem: Chronic Conditions and Co-morbidities  Goal: Patient's chronic conditions and co-morbidity symptoms are monitored and maintained or improved  Outcome: Progressing     Problem: Nutrition Deficit:  Goal: Optimize nutritional status  Outcome: Progressing     Problem: Pain  Goal: Verbalizes/displays adequate comfort level or baseline comfort level  Outcome: Progressing     Problem: Skin/Tissue Integrity  Goal: Absence of new skin breakdown  Description: 1. Monitor for areas of redness and/or skin breakdown  2. Assess vascular access sites hourly  3. Every 4-6 hours minimum:  Change oxygen saturation probe site  4. Every 4-6 hours:  If on nasal continuous positive airway pressure, respiratory therapy assess nares and determine need for appliance change or resting period.   Outcome: Progressing

## 2022-10-29 NOTE — PROGRESS NOTES
Patient was seen and examined. No new complaints from surgery standpoint. Patient is here for medical reasons. Afebrile vital signs are stable. Bruising on the face is stable. Tolerating diet. Continue medical management. I will sign off the case. Please call me as needed.

## 2022-10-29 NOTE — PLAN OF CARE
Problem: Discharge Planning  Goal: Discharge to home or other facility with appropriate resources  10/29/2022 0634 by Carlito Goss RN  Outcome: Progressing     Problem: Safety - Adult  Goal: Free from fall injury  10/29/2022 0634 by Carlito Goss RN  Outcome: Progressing     Problem: ABCDS Injury Assessment  Goal: Absence of physical injury  10/29/2022 0634 by Carlito Goss RN  Outcome: Progressing     Problem: Cardiovascular - Adult  Goal: Maintains optimal cardiac output and hemodynamic stability  10/29/2022 0634 by Carlito Goss RN  Outcome: Progressing     Problem: Chronic Conditions and Co-morbidities  Goal: Patient's chronic conditions and co-morbidity symptoms are monitored and maintained or improved  10/29/2022 0634 by Carlito Goss RN  Outcome: Progressing     Problem: Pain  Goal: Verbalizes/displays adequate comfort level or baseline comfort level  10/29/2022 0634 by Carlito Goss RN  Outcome: Progressing

## 2022-10-29 NOTE — PROGRESS NOTES
Pharmacy Note  Warfarin Consult follow-up      Recent Labs     10/27/22  0449 10/28/22  0555 10/29/22  0456   INR 2.2 2.6 2.6     Recent Labs     10/27/22  0449 10/28/22  0555 10/29/22  0456   HGB 10.0* 8.2* 7.3*   HCT 31.3* 25.0* 22.1*    227 218       Significant Drug-Drug Interactions:  Current warfarin drug-drug interactions: Cholestyramine and clonazepam      Date             INR        Dose given previous day  Dose scheduled for today  10/29/2022            2.6       0.5mg           0.5mg        Notes:                   INR therapeutic will schedule 0.5mg dose today. Daily PT/INR while inpatient.      [facility]    10/29/2022   7:01 AM

## 2022-10-29 NOTE — PROGRESS NOTES
[facility]    Progress Note    10/29/2022    8:34 AM    Name:   Anne Shay  MRN:     [id number]     [place]:      [de-identified]   Room:   [address] [place] [address] Day:  4  Admit Date:  10/25/2022 12:32 PM    PCP:   DANDRE Mirza CNP  Code Status:  Full Code    Subjective:     C/C:   Chief Complaint   Patient presents with    Fall     Hit head, no loc     Interval History Status: improved. Patient admitted after a fall, states that she is doing well today. Denies any new complaints. Tolerating oral okay but states that she had 3 loose stools last night. Afebrile  BP stable  Respiratory status stable on room air  No leucocytosis  Hemoglobin   Dropped to 7.3  Platelets stable  Renal function stable      Review of Systems:     Constitutional:  negative for chills, fevers, sweats  Respiratory:  negative for cough, dyspnea on exertion, shortness of breath, wheezing  Cardiovascular:  negative for chest pain, chest pressure/discomfort, palpitations  Gastrointestinal:  negative for abdominal pain, constipation, diarrhea, nausea, vomiting  Neurological:  negative for  headache    Medications: Allergies:     Allergies   Allergen Reactions    Latex Rash    Pcn [Penicillins] Anaphylaxis    Dilaudid [Hydromorphone] Other (See Comments)     Abnormal behaviors    Norco [Hydrocodone-Acetaminophen] Other (See Comments)     Abnormal behavior    Nitrofurantoin Palpitations    Sulfa Antibiotics Rash       Current Meds:   Scheduled Meds:    warfarin  0.5 mg Oral Once    albumin human  25 g IntraVENous Daily    fludrocortisone  0.1 mg Oral Daily    potassium chloride  20 mEq Oral BID WC    cholestyramine light  4 g Oral BID    midodrine  10 mg Oral TID    lipase-protease-amylase  5,000 Units Oral TID WC    ferrous sulfate  325 mg Oral Daily with breakfast    sodium chloride flush  10 mL IntraVENous 2 times per day    warfarin placeholder: dosing by pharmacy   Other RX Placeholder    levothyroxine  100 mcg Oral Daily    insulin lispro  0-4 Units SubCUTAneous TID     insulin lispro  0-4 Units SubCUTAneous Nightly     Continuous Infusions:    sodium chloride 75 mL/hr at 10/29/22 0234    sodium chloride      dextrose       PRN Meds: sodium chloride flush, clonazePAM, sodium chloride flush, sodium chloride, potassium chloride **OR** potassium alternative oral replacement **OR** potassium chloride, magnesium sulfate, ondansetron **OR** ondansetron, magnesium hydroxide, acetaminophen **OR** acetaminophen, ipratropium-albuterol, glucose, dextrose bolus **OR** dextrose bolus, glucagon (rDNA), dextrose    Data:     Past Medical History:   has a past medical history of Atrial fibrillation ([address] Hypertension, and Thyroid disease. Social History:   reports that she has never smoked. She does not have any smokeless tobacco history on file. She reports that she does not drink alcohol and does not use drugs. Family History: No family history on file. Vitals:  BP (!) 96/55   Pulse 88   Temp 97.3 °F (36.3 °C) (Oral)   Resp 16   Ht 5' (1.524 m)   Wt 112 lb 3.4 oz (50.9 kg)   SpO2 98%   BMI 21.92 kg/m²   Temp (24hrs), Av.4 °F (36.3 °C), Min:97.3 °F (36.3 °C), Max:97.5 °F (36.4 °C)    Recent Labs     10/28/22  1459 10/28/22  1603 10/28/22  2106 10/29/22  0644   POCGLU 151* 213* 316* 127*       I/O (24Hr):   No intake or output data in the 24 hours ending 10/29/22 0834    Labs:  Hematology:  Recent Labs     10/27/22  0449 10/28/22  0555 10/29/22  0456   WBC 6.9 7.6 5.5   RBC 3.60* 2.97* 2.64*   HGB 10.0* 8.2* 7.3*   HCT 31.3* 25.0* 22.1*   MCV 87.0 84.0 83.9   MCH 27.9 27.7 27.6   MCHC 32.0 33.0 33.0   RDW 17.0* 16.5* 16.6*    227 218   MPV 8.6 7.9 7.6   INR 2.2 2.6 2.6     Chemistry:  Recent Labs     10/27/22  0449 10/28/22  0555 10/29/22  0456    135 136   K 3.3* 3.7 3.8    103 106   CO2 24 25 24   GLUCOSE 136* 101* 155*   BUN 9 13 12   CREATININE 0.70 1.05* 0.91*   MG 2.0  --   --    ANIONGAP 8* 7* 6*   LABGLOM >60 54* >60   CALCIUM 6.8* 7.4* 7.4*     Recent Labs     10/27/22  0449 10/27/22  0949 10/28/22  0555 10/28/22  0629 10/28/22  0802 10/28/22  1121 10/28/22  1459 10/28/22  1603 10/28/22  2106 10/29/22  0644   PROT 4.4*  --  4.1*  --   --   --   --   --   --   --    LABALBU 1.9*  --  2.1*  --   --   --   --   --   --   --    AST 53*  --  52*  --   --   --   --   --   --   --    ALT 59*  --  53*  --   --   --   --   --   --   --    ALKPHOS 177*  --  161*  --   --   --   --   --   --   --    BILITOT 0.4  --  0.4  --   --   --   --   --   --   --    POCGLU  --    < >  --    < > 130* 100 151* 213* 316* 127*    < > = values in this interval not displayed. ABG:No results found for: POCPH, PHART, PH, POCPCO2, HVJ9ABB, PCO2, POCPO2, PO2ART, PO2, POCHCO3, PHN9ELR, HCO3, NBEA, PBEA, BEART, BE, THGBART, THB, NOR2ISC, GRMA4NYY, C4MDIPQS, O2SAT, FIO2  No results found for: SPECIAL  Lab Results   Component Value Date/Time    CULTURE NO GROWTH 12 HOURS 10/28/2022 10:23 AM    CULTURE NO GROWTH 12 HOURS 10/28/2022 10:23 AM       Radiology:  XR ELBOW LEFT (MIN 3 VIEWS)    Result Date: 10/25/2022  1. Mild degenerative changes of the ulnohumeral joint. 2. No acute fracture or dislocation. XR HIP LEFT (2-3 VIEWS)    Result Date: 10/25/2022  No acute osseous abnormality in the bilateral hips or knees. Given the degree of osteopenia, nondisplaced fractures may be radiographically occult. If pain or concern for fracture persists, consider CT or MR imaging. XR HIP RIGHT (2-3 VIEWS)    Result Date: 10/25/2022  No acute osseous abnormality in the bilateral hips or knees. Given the degree of osteopenia, nondisplaced fractures may be radiographically occult. If pain or concern for fracture persists, consider CT or MR imaging. XR KNEE LEFT (3 VIEWS)    Result Date: 10/25/2022  No acute osseous abnormality in the bilateral hips or knees.   Given the degree of osteopenia, nondisplaced fractures may be radiographically occult. If pain or concern for fracture persists, consider CT or MR imaging. XR KNEE RIGHT (3 VIEWS)    Result Date: 10/25/2022  No acute osseous abnormality in the bilateral hips or knees. Given the degree of osteopenia, nondisplaced fractures may be radiographically occult. If pain or concern for fracture persists, consider CT or MR imaging. CT HEAD WO CONTRAST    Result Date: 10/25/2022  No acute intracranial abnormality identified by CT. CT FACIAL BONES WO CONTRAST    Result Date: 10/25/2022  Acute nondisplaced nasal bone fractures bilaterally. CT CERVICAL SPINE WO CONTRAST    Result Date: 10/25/2022  No acute cervical spine fracture. XR CHEST PORTABLE    Result Date: 10/28/2022  No evidence of acute cardiopulmonary disease. 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 No evidence of mass or abnormality in the pancreatectomy bed. Mild hepatic steatosis. No biliary ductal dilatation. Status post cholecystectomy.        Physical Examination:        General appearance:  alert, cooperative and no distress  Mental Status:  oriented to person, place and time and normal affect  Lungs:  clear to auscultation bilaterally, normal effort  Heart:  regular rate and rhythm, no murmur  Abdomen:  soft, nontender, nondistended, normal bowel sounds,   Extremities:   trace edema present      Assessment:        Hospital Problems             Last Modified POA    * (Principal) Generalized weakness 10/25/2022 Yes    COPD (chronic obstructive pulmonary disease) ([address] 10/25/2022 Yes    Overview Signed 10/25/2022  3:16 PM by Galo Tim MD     Formatting of this note might be different from the original.  no medications, mild, PFT scheduled 7/1/22, no home O2, no hospitalization or intubation         Paroxysmal atrial fibrillation ([address] 10/25/2022 Yes    Iron deficiency anemia 10/25/2022 Yes Hypothyroidism 10/25/2022 Yes    Closed fracture of nasal bone 10/25/2022 Yes    Elevated LFTs 10/26/2022 Yes    Diabetes mellitus ([address] 10/26/2022 Yes    Exocrine pancreatic insufficiency 10/26/2022 Yes    Diarrhea 10/27/2022 Yes    Anemia 10/27/2022 Yes    Frequent falls 10/27/2022 Yes       Plan:          Frequent falls-  PT/OT for gait training. Patient has nasal bone fracture, needs outpatient follow-up    AFib-  currently on warfarin   Patient has history of pancreatitis status post pancreatectomy, has diarrhea, GI following, elevated liver enzymes, workup is in progress,  started on Zenpep. Also on cholestyramine   Hypotension-  patient is on Florinef and midodrine. Also albumin ordered per Cardiology   Anemia, patient is on ferrous sulfate, she is currently on warfarin and hemoglobin has been steadily dropping, will monitor closely   Hypokalemia-  potassium supplements   Hypothyroidism-  Synthroid 100 mcg.  TSH high but free T4 normal, synthroid was increased to 509 Diane Wills MD  10/29/2022  8:34 AM

## 2022-10-30 LAB
ABSOLUTE EOS #: 0.1 K/UL (ref 0–0.4)
ABSOLUTE LYMPH #: 1.7 K/UL (ref 1–4.8)
ABSOLUTE MONO #: 0.6 K/UL (ref 0.1–1.3)
ANION GAP SERPL CALCULATED.3IONS-SCNC: 8 MMOL/L (ref 9–17)
BASOPHILS # BLD: 1 % (ref 0–2)
BASOPHILS ABSOLUTE: 0 K/UL (ref 0–0.2)
BUN BLDV-MCNC: 10 MG/DL (ref 8–23)
CALCIUM SERPL-MCNC: 7.6 MG/DL (ref 8.6–10.4)
CHLORIDE BLD-SCNC: 102 MMOL/L (ref 98–107)
CO2: 26 MMOL/L (ref 20–31)
CREAT SERPL-MCNC: 0.94 MG/DL (ref 0.5–0.9)
EOSINOPHILS RELATIVE PERCENT: 2 % (ref 0–4)
GFR SERPL CREATININE-BSD FRML MDRD: >60 ML/MIN/1.73M2
GLUCOSE BLD-MCNC: 159 MG/DL (ref 65–105)
GLUCOSE BLD-MCNC: 244 MG/DL (ref 65–105)
GLUCOSE BLD-MCNC: 252 MG/DL (ref 65–105)
GLUCOSE BLD-MCNC: 296 MG/DL (ref 65–105)
GLUCOSE BLD-MCNC: 299 MG/DL (ref 70–99)
HCT VFR BLD CALC: 27.6 % (ref 36–46)
HEMOGLOBIN: 9.2 G/DL (ref 12–16)
INR BLD: 2.2
LIVER-KIDNEY MICROSOMAL AB: NORMAL
LYMPHOCYTES # BLD: 36 % (ref 24–44)
MCH RBC QN AUTO: 28.1 PG (ref 26–34)
MCHC RBC AUTO-ENTMCNC: 33.1 G/DL (ref 31–37)
MCV RBC AUTO: 84.8 FL (ref 80–100)
MONOCYTES # BLD: 13 % (ref 1–7)
PDW BLD-RTO: 16.7 % (ref 11.5–14.9)
PLATELET # BLD: 235 K/UL (ref 150–450)
PMV BLD AUTO: 7.7 FL (ref 6–12)
POTASSIUM SERPL-SCNC: 3.6 MMOL/L (ref 3.7–5.3)
PROTHROMBIN TIME: 24 SEC (ref 11.8–14.6)
RBC # BLD: 3.26 M/UL (ref 4–5.2)
SEG NEUTROPHILS: 48 % (ref 36–66)
SEGMENTED NEUTROPHILS ABSOLUTE COUNT: 2.3 K/UL (ref 1.3–9.1)
SODIUM BLD-SCNC: 136 MMOL/L (ref 135–144)
WBC # BLD: 4.9 K/UL (ref 3.5–11)

## 2022-10-30 PROCEDURE — 6370000000 HC RX 637 (ALT 250 FOR IP): Performed by: NURSE PRACTITIONER

## 2022-10-30 PROCEDURE — 36415 COLL VENOUS BLD VENIPUNCTURE: CPT

## 2022-10-30 PROCEDURE — 2580000003 HC RX 258: Performed by: FAMILY MEDICINE

## 2022-10-30 PROCEDURE — 6370000000 HC RX 637 (ALT 250 FOR IP): Performed by: FAMILY MEDICINE

## 2022-10-30 PROCEDURE — 82947 ASSAY GLUCOSE BLOOD QUANT: CPT

## 2022-10-30 PROCEDURE — 82103 ALPHA-1-ANTITRYPSIN TOTAL: CPT

## 2022-10-30 PROCEDURE — 80048 BASIC METABOLIC PNL TOTAL CA: CPT

## 2022-10-30 PROCEDURE — APPSS30 APP SPLIT SHARED TIME 16-30 MINUTES: Performed by: NURSE PRACTITIONER

## 2022-10-30 PROCEDURE — 2060000000 HC ICU INTERMEDIATE R&B

## 2022-10-30 PROCEDURE — 85025 COMPLETE CBC W/AUTO DIFF WBC: CPT

## 2022-10-30 PROCEDURE — 6370000000 HC RX 637 (ALT 250 FOR IP)

## 2022-10-30 PROCEDURE — 85610 PROTHROMBIN TIME: CPT

## 2022-10-30 PROCEDURE — 82104 ALPHA-1-ANTITRYPSIN PHENO: CPT

## 2022-10-30 PROCEDURE — 99232 SBSQ HOSP IP/OBS MODERATE 35: CPT | Performed by: INTERNAL MEDICINE

## 2022-10-30 RX ORDER — WARFARIN SODIUM 1 MG/1
1 TABLET ORAL
Status: COMPLETED | OUTPATIENT
Start: 2022-10-30 | End: 2022-10-30

## 2022-10-30 RX ADMIN — CLONAZEPAM 0.5 MG: 0.5 TABLET ORAL at 20:39

## 2022-10-30 RX ADMIN — SODIUM CHLORIDE, PRESERVATIVE FREE 10 ML: 5 INJECTION INTRAVENOUS at 20:39

## 2022-10-30 RX ADMIN — POTASSIUM CHLORIDE 20 MEQ: 1500 TABLET, EXTENDED RELEASE ORAL at 08:08

## 2022-10-30 RX ADMIN — INSULIN LISPRO 2 UNITS: 100 INJECTION, SOLUTION INTRAVENOUS; SUBCUTANEOUS at 11:22

## 2022-10-30 RX ADMIN — MIDODRINE HYDROCHLORIDE 10 MG: 10 TABLET ORAL at 08:08

## 2022-10-30 RX ADMIN — MIDODRINE HYDROCHLORIDE 10 MG: 10 TABLET ORAL at 20:39

## 2022-10-30 RX ADMIN — PANCRELIPASE LIPASE, PANCRELIPASE PROTEASE, PANCRELIPASE AMYLASE 10000 UNITS: 5000; 17000; 24000 CAPSULE, DELAYED RELEASE ORAL at 17:50

## 2022-10-30 RX ADMIN — CHOLESTYRAMINE 4 G: 4 POWDER, FOR SUSPENSION ORAL at 20:39

## 2022-10-30 RX ADMIN — PANCRELIPASE LIPASE, PANCRELIPASE PROTEASE, PANCRELIPASE AMYLASE 10000 UNITS: 5000; 17000; 24000 CAPSULE, DELAYED RELEASE ORAL at 08:07

## 2022-10-30 RX ADMIN — INSULIN LISPRO 2 UNITS: 100 INJECTION, SOLUTION INTRAVENOUS; SUBCUTANEOUS at 08:18

## 2022-10-30 RX ADMIN — SODIUM CHLORIDE, PRESERVATIVE FREE 10 ML: 5 INJECTION INTRAVENOUS at 08:08

## 2022-10-30 RX ADMIN — FERROUS SULFATE TAB 325 MG (65 MG ELEMENTAL FE) 325 MG: 325 (65 FE) TAB at 08:08

## 2022-10-30 RX ADMIN — LEVOTHYROXINE SODIUM 100 MCG: 0.1 TABLET ORAL at 05:42

## 2022-10-30 RX ADMIN — PANCRELIPASE LIPASE, PANCRELIPASE PROTEASE, PANCRELIPASE AMYLASE 10000 UNITS: 5000; 17000; 24000 CAPSULE, DELAYED RELEASE ORAL at 12:45

## 2022-10-30 RX ADMIN — FLUDROCORTISONE ACETATE 0.1 MG: 0.1 TABLET ORAL at 09:43

## 2022-10-30 RX ADMIN — WARFARIN SODIUM 1 MG: 1 TABLET ORAL at 20:39

## 2022-10-30 NOTE — PROGRESS NOTES
GI Progress notes    10/30/2022   1:01 PM    Name:  Todd Colon  MRN:    [id number]     [place]:     [de-identified]   Room:  [facility] Day: 5     Admit Date: 10/25/2022 12:32 PM  PCP: DANDRE Garcia CNP    Subjective:     C/C:   Chief Complaint   Patient presents with    Fall     Hit head, no loc       Interval History: Status: improved. Patient seen and examined  No acute events overnight  Tolerating Zenpep. Reports stools are more formed today  Tolerating diet well. 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 No prior hx of liver issues per patient. ROS:  Constitutional: negative for chills, fevers and sweats  Gastrointestinal: negative for abdominal pain, constipation, nausea and vomiting  Neurological: negative for dizziness and headaches    Medications: Allergies:    Allergies   Allergen Reactions    Latex Rash    Pcn [Penicillins] Anaphylaxis    Dilaudid [Hydromorphone] Other (See Comments)     Abnormal behaviors    Norco [Hydrocodone-Acetaminophen] Other (See Comments)     Abnormal behavior    Nitrofurantoin Palpitations    Sulfa Antibiotics Rash       Current Meds: warfarin (COUMADIN) tablet 1 mg, Once  lipase-protease-amylase (ZENPEP) delayed release capsule 10,000 Units, TID WC  0.9 % sodium chloride infusion, Continuous  sodium chloride flush 0.9 % injection 10 mL, PRN  fludrocortisone (FLORINEF) tablet 0.1 mg, Daily  cholestyramine light packet 4 g, BID  midodrine (PROAMATINE) tablet 10 mg, TID  clonazePAM (KLONOPIN) tablet 0.5 mg, Daily PRN  ferrous sulfate (IRON 325) tablet 325 mg, Daily with breakfast  sodium chloride flush 0.9 % injection 10 mL, 2 times per day  sodium chloride flush 0.9 % injection 10 mL, PRN  0.9 % sodium chloride infusion, PRN  potassium chloride (KLOR-CON M) extended release tablet 40 mEq, PRN   Or  potassium bicarb-citric acid (EFFER-K) effervescent tablet 40 mEq, PRN   Or  potassium chloride 10 mEq/100 mL IVPB (Peripheral Line), PRN  magnesium sulfate 1000 mg in dextrose 5% 100 mL IVPB, PRN  ondansetron (ZOFRAN-ODT) disintegrating tablet 4 mg, Q8H PRN   Or  ondansetron (ZOFRAN) injection 4 mg, Q6H PRN  magnesium hydroxide (MILK OF MAGNESIA) 400 MG/5ML suspension 30 mL, Daily PRN  acetaminophen (TYLENOL) tablet 650 mg, Q6H PRN   Or  acetaminophen (TYLENOL) suppository 650 mg, Q6H PRN  ipratropium-albuterol (DUONEB) nebulizer solution 1 ampule, Q4H PRN  warfarin placeholder: dosing by pharmacy, RX Placeholder  levothyroxine (SYNTHROID) tablet 100 mcg, Daily  glucose chewable tablet 16 g, PRN  dextrose bolus 10% 125 mL, PRN   Or  dextrose bolus 10% 250 mL, PRN  glucagon (rDNA) injection 1 mg, PRN  dextrose 10 % infusion, Continuous PRN  insulin lispro (HUMALOG) injection vial 0-4 Units, TID WC  insulin lispro (HUMALOG) injection vial 0-4 Units, Nightly        Data:     Code Status:  Full Code    No family history on file.     Social History     Socioeconomic History    Marital status:      Spouse name: Not on file    Number of children: Not on file    Years of education: Not on file    Highest education level: Not on file   Occupational History    Not on file   Tobacco Use    Smoking status: Never    Smokeless tobacco: Not on file   Substance and Sexual Activity    Alcohol use: No    Drug use: No    Sexual activity: Not on file   Other Topics Concern    Not on file   Social History Narrative    Not on file     Social Determinants of Health     Financial Resource Strain: Not on file   Food Insecurity: Not on file   Transportation Needs: Not on file   Physical Activity: Not on file   Stress: Not on file   Social Connections: Not on file   Intimate Partner Violence: Not on file   Housing Stability: Not on file       Vitals:  BP (!) 104/57   Pulse 77   Temp 97.5 °F (36.4 °C) (Oral)   Resp 16   Ht 5' (1.524 m)   Wt 115 lb 11.9 oz (52.5 kg)   SpO2 99%   BMI 22.60 kg/m²   Temp (24hrs), Av.8 °F (36.6 °C), Min:97.5 °F (36.4 °C), Max:97.9 °F (36.6 °C)    Recent Labs     10/29/22  1615 10/29/22  2019 10/30/22  0629 10/30/22  1102   POCGLU 350* 276* 252* 296*       I/O (24Hr):     Intake/Output Summary (Last 24 hours) at 10/30/2022 1301  Last data filed at 10/30/2022 0946  Gross per 24 hour   Intake 840 ml   Output 1025 ml   Net -185 ml       Labs:      CBC:   Lab Results   Component Value Date/Time    WBC 4.9 10/30/2022 06:26 AM    RBC 3.26 10/30/2022 06:26 AM    RBC 4.42 2012 01:10 PM    HGB 9.2 10/30/2022 06:26 AM    HCT 27.6 10/30/2022 06:26 AM    MCV 84.8 10/30/2022 06:26 AM    MCH 28.1 10/30/2022 06:26 AM    MCHC 33.1 10/30/2022 06:26 AM    RDW 16.7 10/30/2022 06:26 AM     10/30/2022 06:26 AM     2012 01:10 PM    MPV 7.7 10/30/2022 06:26 AM     CBC with Differential:    Lab Results   Component Value Date/Time    WBC 4.9 10/30/2022 06:26 AM    RBC 3.26 10/30/2022 06:26 AM    RBC 4.42 2012 01:10 PM    HGB 9.2 10/30/2022 06:26 AM    HCT 27.6 10/30/2022 06:26 AM     10/30/2022 06:26 AM     2012 01:10 PM    MCV 84.8 10/30/2022 06:26 AM    MCH 28.1 10/30/2022 06:26 AM    MCHC 33.1 10/30/2022 06:26 AM    RDW 16.7 10/30/2022 06:26 AM    LYMPHOPCT 36 10/30/2022 06:26 AM    MONOPCT 13 10/30/2022 06:26 AM    BASOPCT 1 10/30/2022 06:26 AM    MONOSABS 0.60 10/30/2022 06:26 AM    LYMPHSABS 1.70 10/30/2022 06:26 AM    EOSABS 0.10 10/30/2022 06:26 AM    BASOSABS 0.00 10/30/2022 06:26 AM    DIFFTYPE NOT REPORTED 2017 04:50 PM     Hemoglobin/Hematocrit:    Lab Results   Component Value Date/Time    HGB 9.2 10/30/2022 06:26 AM    HCT 27.6 10/30/2022 06:26 AM     CMP:    Lab Results   Component Value Date/Time     10/30/2022 06:26 AM    K 3.6 10/30/2022 06:26 AM     10/30/2022 06:26 AM    CO2 26 10/30/2022 06:26 AM    BUN 10 10/30/2022 06:26 AM    CREATININE 0.94 10/30/2022 06:26 AM    GFRAA >60 2022 09:05 AM LABGLOM >60 10/30/2022 06:26 AM    GLUCOSE 299 10/30/2022 06:26 AM    PROT 4.1 10/28/2022 05:55 AM    LABALBU 2.1 10/28/2022 05:55 AM    CALCIUM 7.6 10/30/2022 06:26 AM    BILITOT 0.4 10/28/2022 05:55 AM    ALKPHOS 161 10/28/2022 05:55 AM    AST 52 10/28/2022 05:55 AM    ALT 53 10/28/2022 05:55 AM     BMP:    Lab Results   Component Value Date/Time     10/30/2022 06:26 AM    K 3.6 10/30/2022 06:26 AM     10/30/2022 06:26 AM    CO2 26 10/30/2022 06:26 AM    BUN 10 10/30/2022 06:26 AM    LABALBU 2.1 10/28/2022 05:55 AM    CREATININE 0.94 10/30/2022 06:26 AM    CALCIUM 7.6 10/30/2022 06:26 AM    GFRAA >60 08/11/2022 09:05 AM    LABGLOM >60 10/30/2022 06:26 AM    GLUCOSE 299 10/30/2022 06:26 AM     PT/INR:    Lab Results   Component Value Date/Time    PROTIME 24.0 10/30/2022 06:26 AM    INR 2.2 10/30/2022 06:26 AM     PTT:    Lab Results   Component Value Date/Time    APTT 35.1 10/25/2022 12:53 PM   [APTT}    Physical Examination:        General appearance: alert, cooperative and no distress  Mental Status: oriented to person, place and time and normal affect  Abdomen: soft, nontender, nondistended, bowel sounds present   Extremities: no edema, redness or tenderness in the calves  Skin: no gross lesions, rashes, or induration    Assessment:        Primary Problem  Generalized weakness     Active Hospital Problems    Diagnosis Date Noted    Diarrhea [R19.7] 10/27/2022     Priority: Medium    Anemia [D64.9] 10/27/2022     Priority: Medium    Frequent falls [R29.6] 10/27/2022     Priority: Medium    Elevated LFTs [R79.89] 10/26/2022     Priority: Medium    Diabetes mellitus ([address] [E11.9] 10/26/2022     Priority: Medium    Exocrine pancreatic insufficiency [K86.81] 10/26/2022     Priority: Medium    Generalized weakness [R53.1] 10/25/2022     Priority: Medium    Iron deficiency anemia [D50.9] 10/25/2022     Priority: Medium    Hypothyroidism [E03.9] 10/25/2022     Priority: Medium    Closed fracture of nasal bone [S02. 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 Liver disease work-up ongoing  Ceruloplasmin low  24 urine copper  AAT deficiency  Add phenotype     Time spent reviewing the chart, seeing the patient, and discussing with the attending MD around 30 minutes. Explained to the patient and d/W Nursing Staff  Will F/U with you  Please call or Page for any issues or change in status  Thanks    Electronically signed by Aurelia Lombard, APRN - NP on 10/30/2022 at 1:01 PM       GI attending physician note. Patient seen with DANDRE    I independently performed an evaluation on [place]. I have reviewed the above documentation completed by the Nurse Practitioner and agree with the history, examination, and management plan. Recommendations discussed.          Liver disease work-up in progress

## 2022-10-30 NOTE — PROGRESS NOTES
Physician Progress Note      Misty Cassidy  [facility] #:                  [id number]  :                       1940  ADMIT DATE:       10/25/2022 12:32 PM  [address] [place] DATE:  RESPONDING  PROVIDER #:        Ashley Walker MD          QUERY TEXT:    Pt admitted for evaluation of generalized weakness, lightheadedness, and   frequent loose stools. Through work up completed. GI consultant notes   \"Diarrhea, likely secondary to pancreatic insufficiency\". If possible, please   document in progress notes and discharge summary if there is a relationship   between any of the following    The medical record reflects the following:  -Risk Factors: s/p total pancreatectomy due to high-grade dysplasia, IPMN  -Clinical Indicators: Pt admitted s/p fall, reports she has been having   dizziness for 2 weeks. Positive Orthostatic's in the ED. Blood pressure   running on the low side since admission. Continued diarrhea. Albumin and NS   for fluid replacement. Cardiology consulted and notes \"GI consulted,   patients' problem is GI related, keep pt well hydrated\". GI consult note   states, pt underwent pancreatectomy 2022, severe diarrhea since her   surgery. Progress note 10/29: \"Diarrhea most likely r/t pancreatic   insufficiency\"  -Treatment: IV Albumin, IVFs, MRI MRCP, GI + Cardiology consult. PERT,   Ceruloplasmin, Cholestyramine  Options provided:  -- Generalized weakness, lightheadedness and diarrhea related to pancreatic   insufficiency  -- Generalized weakness, lightheadedness and diarrhea unrelated to pancreatic   insufficiency but is due to, please specify etiology. -- Other - I will add my own diagnosis  -- Disagree - Not applicable / Not valid  -- Disagree - Clinically unable to determine / Unknown  -- Refer to Clinical Documentation Reviewer    PROVIDER RESPONSE TEXT:    This patient's generalized weakness, lightheadedness and diarrhea is related   to pancreatic insufficiency.     Query created by: Sheila Zeng on 10/29/2022 2:19 PM      Electronically signed by:  Richard Zhao MD 10/30/2022 12:36 PM

## 2022-10-30 NOTE — PROGRESS NOTES
[facility]    Progress Note    10/30/2022    9:37 AM    Name:   Roxi Kim  MRN:     [id number]     [place]:      [de-identified]   Room:   [address] [place] [address] Day:  5  Admit Date:  10/25/2022 12:32 PM    PCP:   DANDRE Power CNP  Code Status:  Full Code    Subjective:     C/C:   Chief Complaint   Patient presents with    Fall     Hit head, no loc     Interval History Status: improved. Patient admitted after a fall, states that she is doing well today. Denies any new complaints. States that her stools are starting to be more formed. Patient is currently on the generic Creon, states that she is tolerating it better. Does have some swelling in right upper extremity which seems to be improving. Afebrile  BP stable  Respiratory status stable on room air  No leucocytosis  Hemoglobin   stable at 9.2  Platelets stable  Renal function stable      Review of Systems:     Constitutional:  negative for chills, fevers, sweats  Respiratory:  negative for cough, dyspnea on exertion, shortness of breath, wheezing  Cardiovascular:  negative for chest pain, chest pressure/discomfort, palpitations  Gastrointestinal:  negative for abdominal pain, constipation, diarrhea, nausea, vomiting  Neurological:  negative for  headache    Medications: Allergies:     Allergies   Allergen Reactions    Latex Rash    Pcn [Penicillins] Anaphylaxis    Dilaudid [Hydromorphone] Other (See Comments)     Abnormal behaviors    Norco [Hydrocodone-Acetaminophen] Other (See Comments)     Abnormal behavior    Nitrofurantoin Palpitations    Sulfa Antibiotics Rash       Current Meds:   Scheduled Meds:    warfarin  1 mg Oral Once    lipase-protease-amylase  10,000 Units Oral TID WC    fludrocortisone  0.1 mg Oral Daily    cholestyramine light  4 g Oral BID    midodrine  10 mg Oral TID    ferrous sulfate  325 mg Oral Daily with breakfast    sodium chloride flush  10 mL IntraVENous 2 times per day    warfarin placeholder: dosing by pharmacy   Other RX Placeholder    levothyroxine  100 mcg Oral Daily    insulin lispro  0-4 Units SubCUTAneous TID     insulin lispro  0-4 Units SubCUTAneous Nightly     Continuous Infusions:    sodium chloride 75 mL/hr at 10/29/22 0234    sodium chloride      dextrose       PRN Meds: sodium chloride flush, clonazePAM, sodium chloride flush, sodium chloride, potassium chloride **OR** potassium alternative oral replacement **OR** potassium chloride, magnesium sulfate, ondansetron **OR** ondansetron, magnesium hydroxide, acetaminophen **OR** acetaminophen, ipratropium-albuterol, glucose, dextrose bolus **OR** dextrose bolus, glucagon (rDNA), dextrose    Data:     Past Medical History:   has a past medical history of Atrial fibrillation ([address] Hypertension, and Thyroid disease. Social History:   reports that she has never smoked. She does not have any smokeless tobacco history on file. She reports that she does not drink alcohol and does not use drugs. Family History: No family history on file. Vitals:  BP (!) 104/57   Pulse 77   Temp 97.5 °F (36.4 °C) (Oral)   Resp 16   Ht 5' (1.524 m)   Wt 115 lb 11.9 oz (52.5 kg)   SpO2 99%   BMI 22.60 kg/m²   Temp (24hrs), Av.7 °F (36.5 °C), Min:97.5 °F (36.4 °C), Max:97.9 °F (36.6 °C)    Recent Labs     10/29/22  1116 10/29/22  1615 10/29/22  2019 10/30/22  0629   POCGLU 221* 350* 276* 252*       I/O (24Hr):     Intake/Output Summary (Last 24 hours) at 10/30/2022 0937  Last data filed at 10/30/2022 0934  Gross per 24 hour   Intake 1080 ml   Output 725 ml   Net 355 ml       Labs:  Hematology:  Recent Labs     10/28/22  0555 10/29/22  0456 10/30/22  0626   WBC 7.6 5.5 4.9   RBC 2.97* 2.64* 3.26*   HGB 8.2* 7.3* 9.2*   HCT 25.0* 22.1* 27.6*   MCV 84.0 83.9 84.8   MCH 27.7 27.6 28.1   MCHC 33.0 33.0 33.1   RDW 16.5* 16.6* 16.7*    218 235   MPV 7.9 7.6 7.7   INR 2.6 2.6 2.2     Chemistry:  Recent Labs 10/28/22  0555 10/29/22  0456 10/30/22  0626    136 136   K 3.7 3.8 3.6*    106 102   CO2 25 24 26   GLUCOSE 101* 155* 299*   BUN 13 12 10   CREATININE 1.05* 0.91* 0.94*   ANIONGAP 7* 6* 8*   LABGLOM 54* >60 >60   CALCIUM 7.4* 7.4* 7.6*     Recent Labs     10/28/22  0555 10/28/22  0629 10/28/22  2106 10/29/22  0644 10/29/22  1116 10/29/22  1615 10/29/22  2019 10/30/22  0629   PROT 4.1*  --   --   --   --   --   --   --    LABALBU 2.1*  --   --   --   --   --   --   --    AST 52*  --   --   --   --   --   --   --    ALT 53*  --   --   --   --   --   --   --    ALKPHOS 161*  --   --   --   --   --   --   --    BILITOT 0.4  --   --   --   --   --   --   --    POCGLU  --    < > 316* 127* 221* 350* 276* 252*    < > = values in this interval not displayed. ABG:No results found for: POCPH, PHART, PH, POCPCO2, KMB9YYJ, PCO2, POCPO2, PO2ART, PO2, POCHCO3, JDO4TGE, HCO3, NBEA, PBEA, BEART, BE, THGBART, THB, JEJ1GZK, KNVY4GWF, U8YSEFSL, O2SAT, FIO2  No results found for: SPECIAL  Lab Results   Component Value Date/Time    CULTURE NO GROWTH 1 DAY 10/28/2022 10:23 AM    CULTURE NO GROWTH 1 DAY 10/28/2022 10:23 AM       Radiology:  XR ELBOW LEFT (MIN 3 VIEWS)    Result Date: 10/25/2022  1. Mild degenerative changes of the ulnohumeral joint. 2. No acute fracture or dislocation. XR HIP LEFT (2-3 VIEWS)    Result Date: 10/25/2022  No acute osseous abnormality in the bilateral hips or knees. Given the degree of osteopenia, nondisplaced fractures may be radiographically occult. If pain or concern for fracture persists, consider CT or MR imaging. XR HIP RIGHT (2-3 VIEWS)    Result Date: 10/25/2022  No acute osseous abnormality in the bilateral hips or knees. Given the degree of osteopenia, nondisplaced fractures may be radiographically occult. If pain or concern for fracture persists, consider CT or MR imaging.      XR KNEE LEFT (3 VIEWS)    Result Date: 10/25/2022  No acute osseous abnormality in the bilateral hips or knees. Given the degree of osteopenia, nondisplaced fractures may be radiographically occult. If pain or concern for fracture persists, consider CT or MR imaging. XR KNEE RIGHT (3 VIEWS)    Result Date: 10/25/2022  No acute osseous abnormality in the bilateral hips or knees. Given the degree of osteopenia, nondisplaced fractures may be radiographically occult. If pain or concern for fracture persists, consider CT or MR imaging. CT HEAD WO CONTRAST    Result Date: 10/25/2022  No acute intracranial abnormality identified by CT. CT FACIAL BONES WO CONTRAST    Result Date: 10/25/2022  Acute nondisplaced nasal bone fractures bilaterally. CT CERVICAL SPINE WO CONTRAST    Result Date: 10/25/2022  No acute cervical spine fracture. XR CHEST PORTABLE    Result Date: 10/28/2022  No evidence of acute cardiopulmonary disease. 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 No evidence of mass or abnormality in the pancreatectomy bed. Mild hepatic steatosis. No biliary ductal dilatation. Status post cholecystectomy.        Physical Examination:        General appearance:  alert, cooperative and no distress  Mental Status:  oriented to person, place and time and normal affect  Lungs:  clear to auscultation bilaterally, normal effort  Heart:  regular rate and rhythm, no murmur  Abdomen:  soft, nontender, nondistended, normal bowel sounds,   Extremities:   trace edema present      Assessment:        Hospital Problems             Last Modified POA    * (Principal) Generalized weakness 10/25/2022 Yes    COPD (chronic obstructive pulmonary disease) ([address] 10/25/2022 Yes    Overview Signed 10/25/2022  3:16 PM by Author MD Desmond     Formatting of this note might be different from the original.  no medications, mild, PFT scheduled 7/1/22, no home O2, no hospitalization or intubation         Paroxysmal atrial fibrillation ([address] 10/25/2022 Yes    Iron deficiency anemia 10/25/2022 Yes    Hypothyroidism 10/25/2022 Yes    Closed fracture of nasal bone 10/25/2022 Yes    Elevated LFTs 10/26/2022 Yes    Diabetes mellitus ([address] 10/26/2022 Yes    Exocrine pancreatic insufficiency 10/26/2022 Yes    Diarrhea 10/27/2022 Yes    Anemia 10/27/2022 Yes    Frequent falls 10/27/2022 Yes       Plan:          Frequent falls-  PT/OT for gait training. Patient has nasal bone fracture, needs outpatient follow-up    AFib-  currently on warfarin   Patient has history of pancreatitis status post pancreatectomy, has diarrhea, GI following, elevated liver enzymes, workup is in progress,  started on Zenpep. Also on cholestyramine   Hypotension-  patient is on Florinef and midodrine. Also albumin ordered per Cardiology   Anemia, patient is on ferrous sulfate, she is currently on warfarin and hemoglobin has been dropping  intermittently, stable today, will monitor closely   Hypokalemia-  potassium supplements   Hypothyroidism-  Synthroid 100 mcg.  TSH high but free T4 normal, synthroid was increased to [address] Diane Wills MD  10/30/2022  9:37 AM

## 2022-10-30 NOTE — PROGRESS NOTES
Date:   10/30/2022  Patient name: Kaia Nunez  Date of admission:  10/25/2022 12:32 PM  MRN:   [id number]  YOB: 1940  PCP: DANDRE Alvarado CNP    Reason for Admission: Generalized weakness [R53.1]  Multiple contusions [T07. XXXA]  Frequent falls [R29.6]  Closed fracture of nasal bone, initial encounter [S02. 2XXA]    Cardiology follow-up: History of paroxysmal A. 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  History of cholecystectomy     CT facial bones without contrast showed acute nondisplaced nasal bone fracture bilaterally  CT brain showed no acute intracranial abnormality  CT cervical spine showed no acute cervical spine fracture     2D echo 10/13/2022  Ejection fraction 55 to 60%  Trace aortic regurgitation mild to moderate mitral regurgitation  Right ventricular systolic pressure 30 mmHg     ECG 8/6/2022 normal sinus rhythm heart rate 77     Venous Doppler lower extremities 10/13/2022 no DVT    History of present illness  Viji Billymaria r Khan is a 72-year-old female who lives alone, normally independent in her activities of daily living, drives car , who came to the ER status post fall. Patient said she was walking and she tripped over her cane and she fell onto her knees and then her face and she sustained a cut into her eyebrows. No loss of consciousness. She did mention she has been having dizziness over the last couple of weeks. She is having persistent diarrhea she has lost about 30 pounds weight.   She had a total pancreatectomy in July 2022     On admission blood pressure was 120/50, heart rate 70 oxygen saturation 99%, temperature 37.1     Lab work on admission  INR 2.7  WBC 6.0, hemoglobin 10.5, platelets 597, MCV 85  TSH 37.09, free thyroxine 1.02  Sodium 133, potassium 4.8, BUN 11, creatinine 0.96, glucose 185, albumin 2.3     Current evaluation  Patient seen and examined, discussed with the patient's nurse  She was having her food  She did not appear in any significant distress  She continues to have tachycardia when she got out of bed  She had about 4 bowel movements today  She denied any chest pain or palpitation  Moderate peripheral edema  Afebrile oxygen saturation 98%  Blood pressure 110/60, heart rate 98    Sodium 136, potassium 3.6, creatinine 0.94, BUN 10, glucose 296  Hemoglobin 9.2, WBC 4.9, platelets 961      Medications:   Scheduled Meds:   warfarin  1 mg Oral Once    lipase-protease-amylase  10,000 Units Oral TID WC    fludrocortisone  0.1 mg Oral Daily    cholestyramine light  4 g Oral BID    midodrine  10 mg Oral TID    ferrous sulfate  325 mg Oral Daily with breakfast    sodium chloride flush  10 mL IntraVENous 2 times per day    warfarin placeholder: dosing by pharmacy   Other RX Placeholder    levothyroxine  100 mcg Oral Daily    insulin lispro  0-4 Units SubCUTAneous TID     insulin lispro  0-4 Units SubCUTAneous Nightly     Continuous Infusions:   sodium chloride Stopped (10/30/22 0959)    sodium chloride      dextrose       CBC:   Recent Labs     10/28/22  0555 10/29/22  0456 10/30/22  0626   WBC 7.6 5.5 4.9   HGB 8.2* 7.3* 9.2*    218 235     BMP:    Recent Labs     10/28/22  0555 10/29/22  0456 10/30/22  0626    136 136   K 3.7 3.8 3.6*    106 102   CO2 25 24 26   BUN 13 12 10   CREATININE 1.05* 0.91* 0.94*   GLUCOSE 101* 155* 299*     Hepatic:   Recent Labs     10/28/22  0555   AST 52*   ALT 53*   BILITOT 0.4   ALKPHOS 161*     Troponin: No results for input(s): TROPONINI in the last 72 hours. BNP: No results for input(s): BNP in the last 72 hours. Lipids: No results for input(s): CHOL, HDL in the last 72 hours. Invalid input(s): LDLCALCU  INR:   Recent Labs     10/28/22  0555 10/29/22  0456 10/30/22  0626   INR 2.6 2.6 2.2       Objective:   Vitals: /65   Pulse 80   Temp 97.8 °F (36.6 °C) (Oral)   Resp 20   Ht 5' (1.524 m)   Wt 115 lb 11.9 oz (52.5 kg)   SpO2 100%   BMI 22.60 kg/m²   General appearance: alert and cooperative with exam  HEENT: Head: Normal, normocephalic, atraumatic. Neck: supple, symmetrical, trachea midline  Lungs: diminished breath sounds bibasilar  Heart: regular rate and rhythm  Abdomen:  Soft bowel sounds present  Extremities: edema ++  Neurologic: Mental status: Alert, oriented, thought content appropriate    EKG: normal sinus rhythm, low voltage artifacts. ECHO: reviewed.    Ejection fraction: 60%    Assessment / Acute Cardiac Problems:   Patient admitted status post fall no loss of consciousness  History of paroxysmal A. fib at present in sinus rhythm  Normal LV systolic function  Peripheral edema most likely combination of hypoproteinemia and anemia  Chronic diarrhea weight loss  Malnutrition  Status post total pancreatectomy    Patient Active Problem List:     Generalized weakness     COPD (chronic obstructive pulmonary disease) (HCC)     Paroxysmal atrial fibrillation (HCC)     Iron deficiency anemia     Hypothyroidism     Closed fracture of nasal bone     Elevated LFTs     Diabetes mellitus ([address]     Exocrine pancreatic insufficiency     Diarrhea     Anemia     Frequent falls      Plan of Treatment:   Medications reviewed  Continue current dose of midodrine, fludrocortisone, levothyroxine, warfarin  Continue current dose of insulin    Patient's main problem is malnutrition status post pancreatectomy  Do not use diuretics      Electronically signed by Ryann Bautista MD on 10/30/2022 at 6:26 PM

## 2022-10-30 NOTE — PROGRESS NOTES
Pharmacy Note  Warfarin Consult follow-up      Recent Labs     10/28/22  0555 10/29/22  0456 10/30/22  0626   INR 2.6 2.6 2.2     Recent Labs     10/28/22  0555 10/29/22  0456 10/30/22  0626   HGB 8.2* 7.3* 9.2*   HCT 25.0* 22.1* 27.6*    218 235       Significant Drug-Drug Interactions:  New warfarin drug-drug interactions: none  Discontinued drug-drug interactions: none  Current warfarin drug-drug interactions: Cholestyramine and clonazepam, levothyroxine, florinef, acetaminophen      Date             INR        Dose given previous day  Dose scheduled for today  10/30/2022      2/2         0.5 mg      1 mg        Notes:     INR went from 2.6 to 2.2 in one day so will increase dose to 1 mg today. Daily PT/INR while inpatient. Edison Pete Valenzuela. Ph.  10/30/2022  7:50 AM

## 2022-10-30 NOTE — CARE COORDINATION
ONGOING DISCHARGE PLAN:    Plan remains for patient to be discharged to [address] [place] [address] with pre-cert started 42/86.     Electronically signed by Elva Armstrong RN on 10/30/2022 at 4:07 PM

## 2022-10-30 NOTE — PROGRESS NOTES
RN spoke with Dr. Jesica Orellana regarding pt's tachycardia, new orders placed for one time dose of Metoprolol 25 mg. Will continue to monitor.

## 2022-10-31 PROBLEM — R19.7 DIARRHEA: Status: RESOLVED | Noted: 2022-10-27 | Resolved: 2022-10-31

## 2022-10-31 LAB
ABSOLUTE EOS #: 0.14 K/UL (ref 0–0.4)
ABSOLUTE LYMPH #: 1.97 K/UL (ref 1–4.8)
ABSOLUTE MONO #: 0.38 K/UL (ref 0.1–1.3)
ALBUMIN SERPL-MCNC: 2.9 G/DL (ref 3.5–5.2)
ALP BLD-CCNC: 179 U/L (ref 35–104)
ALT SERPL-CCNC: 46 U/L (ref 5–33)
ANION GAP SERPL CALCULATED.3IONS-SCNC: 8 MMOL/L (ref 9–17)
AST SERPL-CCNC: 39 U/L
BASOPHILS # BLD: 0 % (ref 0–2)
BASOPHILS ABSOLUTE: 0 K/UL (ref 0–0.2)
BILIRUB SERPL-MCNC: 0.6 MG/DL (ref 0.3–1.2)
BILIRUBIN DIRECT: 0.3 MG/DL
BILIRUBIN, INDIRECT: 0.3 MG/DL (ref 0–1)
BUN BLDV-MCNC: 10 MG/DL (ref 8–23)
CALCIUM SERPL-MCNC: 7.6 MG/DL (ref 8.6–10.4)
CHLORIDE BLD-SCNC: 101 MMOL/L (ref 98–107)
CO2: 26 MMOL/L (ref 20–31)
CREAT SERPL-MCNC: 0.71 MG/DL (ref 0.5–0.9)
EBV EARLY ANTIGEN IGG: 49 U/ML
EBV INTERPRETATION: ABNORMAL
EBV NUCLEAR AG AB: 800 U/ML
EOSINOPHILS RELATIVE PERCENT: 3 % (ref 0–4)
EPSTEIN-BARR VCA IGG: 1631 U/ML
EPSTEIN-BARR VCA IGM: 18 U/ML
GFR SERPL CREATININE-BSD FRML MDRD: >60 ML/MIN/1.73M2
GLUCOSE BLD-MCNC: 158 MG/DL (ref 65–105)
GLUCOSE BLD-MCNC: 270 MG/DL (ref 65–105)
GLUCOSE BLD-MCNC: 300 MG/DL (ref 70–99)
GLUCOSE BLD-MCNC: 348 MG/DL (ref 65–105)
GLUCOSE BLD-MCNC: 392 MG/DL (ref 65–105)
HCT VFR BLD CALC: 28.2 % (ref 36–46)
HEMOGLOBIN: 9.4 G/DL (ref 12–16)
INR BLD: 2.1
LYMPHOCYTES # BLD: 42 % (ref 24–44)
MCH RBC QN AUTO: 28.2 PG (ref 26–34)
MCHC RBC AUTO-ENTMCNC: 33.3 G/DL (ref 31–37)
MCV RBC AUTO: 84.6 FL (ref 80–100)
MONOCYTES # BLD: 8 % (ref 1–7)
MORPHOLOGY: ABNORMAL
MORPHOLOGY: ABNORMAL
PDW BLD-RTO: 16.7 % (ref 11.5–14.9)
PLATELET # BLD: 221 K/UL (ref 150–450)
PMV BLD AUTO: 7.8 FL (ref 6–12)
POTASSIUM SERPL-SCNC: 3.7 MMOL/L (ref 3.7–5.3)
PROTHROMBIN TIME: 23.4 SEC (ref 11.8–14.6)
RBC # BLD: 3.33 M/UL (ref 4–5.2)
SEG NEUTROPHILS: 47 % (ref 36–66)
SEGMENTED NEUTROPHILS ABSOLUTE COUNT: 2.21 K/UL (ref 1.3–9.1)
SODIUM BLD-SCNC: 135 MMOL/L (ref 135–144)
TOTAL PROTEIN: 5 G/DL (ref 6.4–8.3)
WBC # BLD: 4.7 K/UL (ref 3.5–11)

## 2022-10-31 PROCEDURE — 6370000000 HC RX 637 (ALT 250 FOR IP): Performed by: NURSE PRACTITIONER

## 2022-10-31 PROCEDURE — 97116 GAIT TRAINING THERAPY: CPT

## 2022-10-31 PROCEDURE — 2060000000 HC ICU INTERMEDIATE R&B

## 2022-10-31 PROCEDURE — 2580000003 HC RX 258: Performed by: FAMILY MEDICINE

## 2022-10-31 PROCEDURE — 97110 THERAPEUTIC EXERCISES: CPT

## 2022-10-31 PROCEDURE — 99232 SBSQ HOSP IP/OBS MODERATE 35: CPT | Performed by: INTERNAL MEDICINE

## 2022-10-31 PROCEDURE — 85025 COMPLETE CBC W/AUTO DIFF WBC: CPT

## 2022-10-31 PROCEDURE — 36415 COLL VENOUS BLD VENIPUNCTURE: CPT

## 2022-10-31 PROCEDURE — 80048 BASIC METABOLIC PNL TOTAL CA: CPT

## 2022-10-31 PROCEDURE — 6370000000 HC RX 637 (ALT 250 FOR IP)

## 2022-10-31 PROCEDURE — 97535 SELF CARE MNGMENT TRAINING: CPT

## 2022-10-31 PROCEDURE — 97530 THERAPEUTIC ACTIVITIES: CPT

## 2022-10-31 PROCEDURE — 80076 HEPATIC FUNCTION PANEL: CPT

## 2022-10-31 PROCEDURE — 85610 PROTHROMBIN TIME: CPT

## 2022-10-31 PROCEDURE — APPSS30 APP SPLIT SHARED TIME 16-30 MINUTES: Performed by: NURSE PRACTITIONER

## 2022-10-31 PROCEDURE — 6370000000 HC RX 637 (ALT 250 FOR IP): Performed by: FAMILY MEDICINE

## 2022-10-31 RX ORDER — FERROUS SULFATE 325(65) MG
325 TABLET ORAL
Qty: 30 TABLET | Refills: 3 | Status: SHIPPED | OUTPATIENT
Start: 2022-11-01

## 2022-10-31 RX ORDER — LEVOTHYROXINE SODIUM 0.1 MG/1
100 TABLET ORAL DAILY
Qty: 30 TABLET | Refills: 3 | Status: SHIPPED | OUTPATIENT
Start: 2022-11-01

## 2022-10-31 RX ORDER — IPRATROPIUM BROMIDE AND ALBUTEROL SULFATE 2.5; .5 MG/3ML; MG/3ML
3 SOLUTION RESPIRATORY (INHALATION) EVERY 4 HOURS PRN
Qty: 360 ML | Refills: 0 | Status: SHIPPED | OUTPATIENT
Start: 2022-10-31

## 2022-10-31 RX ORDER — MIDODRINE HYDROCHLORIDE 10 MG/1
10 TABLET ORAL 3 TIMES DAILY
Qty: 30 TABLET | Refills: 0
Start: 2022-10-31

## 2022-10-31 RX ORDER — WARFARIN SODIUM 2.5 MG/1
1.25 TABLET ORAL
Status: COMPLETED | OUTPATIENT
Start: 2022-10-31 | End: 2022-10-31

## 2022-10-31 RX ORDER — FLUDROCORTISONE ACETATE 0.1 MG/1
0.1 TABLET ORAL DAILY
Qty: 5 TABLET | Refills: 3
Start: 2022-11-01 | End: 2022-12-01

## 2022-10-31 RX ORDER — FUROSEMIDE 20 MG/1
20 TABLET ORAL DAILY
Status: DISCONTINUED | OUTPATIENT
Start: 2022-10-31 | End: 2022-11-01 | Stop reason: HOSPADM

## 2022-10-31 RX ORDER — ONDANSETRON 4 MG/1
4 TABLET, ORALLY DISINTEGRATING ORAL EVERY 8 HOURS PRN
Qty: 30 TABLET | Refills: 0 | Status: SHIPPED | OUTPATIENT
Start: 2022-10-31

## 2022-10-31 RX ORDER — FUROSEMIDE 20 MG/1
20 TABLET ORAL DAILY
Qty: 60 TABLET | Refills: 3 | Status: SHIPPED | OUTPATIENT
Start: 2022-10-31

## 2022-10-31 RX ADMIN — MIDODRINE HYDROCHLORIDE 10 MG: 10 TABLET ORAL at 20:09

## 2022-10-31 RX ADMIN — INSULIN LISPRO 4 UNITS: 100 INJECTION, SOLUTION INTRAVENOUS; SUBCUTANEOUS at 20:42

## 2022-10-31 RX ADMIN — WARFARIN SODIUM 1.25 MG: 2.5 TABLET ORAL at 18:27

## 2022-10-31 RX ADMIN — INSULIN LISPRO 2 UNITS: 100 INJECTION, SOLUTION INTRAVENOUS; SUBCUTANEOUS at 09:15

## 2022-10-31 RX ADMIN — SODIUM CHLORIDE, PRESERVATIVE FREE 10 ML: 5 INJECTION INTRAVENOUS at 20:10

## 2022-10-31 RX ADMIN — LEVOTHYROXINE SODIUM 100 MCG: 0.1 TABLET ORAL at 06:29

## 2022-10-31 RX ADMIN — CHOLESTYRAMINE 4 G: 4 POWDER, FOR SUSPENSION ORAL at 20:09

## 2022-10-31 RX ADMIN — PANCRELIPASE LIPASE, PANCRELIPASE PROTEASE, PANCRELIPASE AMYLASE 10000 UNITS: 5000; 17000; 24000 CAPSULE, DELAYED RELEASE ORAL at 12:18

## 2022-10-31 RX ADMIN — FERROUS SULFATE TAB 325 MG (65 MG ELEMENTAL FE) 325 MG: 325 (65 FE) TAB at 10:22

## 2022-10-31 RX ADMIN — PANCRELIPASE LIPASE, PANCRELIPASE PROTEASE, PANCRELIPASE AMYLASE 10000 UNITS: 5000; 17000; 24000 CAPSULE, DELAYED RELEASE ORAL at 09:16

## 2022-10-31 RX ADMIN — MIDODRINE HYDROCHLORIDE 10 MG: 10 TABLET ORAL at 14:41

## 2022-10-31 RX ADMIN — MIDODRINE HYDROCHLORIDE 10 MG: 10 TABLET ORAL at 09:16

## 2022-10-31 RX ADMIN — FLUDROCORTISONE ACETATE 0.1 MG: 0.1 TABLET ORAL at 09:59

## 2022-10-31 RX ADMIN — INSULIN LISPRO 3 UNITS: 100 INJECTION, SOLUTION INTRAVENOUS; SUBCUTANEOUS at 17:43

## 2022-10-31 RX ADMIN — SODIUM CHLORIDE, PRESERVATIVE FREE 10 ML: 5 INJECTION INTRAVENOUS at 09:17

## 2022-10-31 RX ADMIN — CLONAZEPAM 0.5 MG: 0.5 TABLET ORAL at 15:21

## 2022-10-31 RX ADMIN — PANCRELIPASE LIPASE, PANCRELIPASE PROTEASE, PANCRELIPASE AMYLASE 10000 UNITS: 5000; 17000; 24000 CAPSULE, DELAYED RELEASE ORAL at 17:44

## 2022-10-31 ASSESSMENT — ENCOUNTER SYMPTOMS
CHEST TIGHTNESS: 0
ALLERGIC/IMMUNOLOGIC NEGATIVE: 1
NAUSEA: 0
COUGH: 0
DIARRHEA: 1
TROUBLE SWALLOWING: 0
SHORTNESS OF BREATH: 0
COLOR CHANGE: 0
ABDOMINAL PAIN: 0
ABDOMINAL DISTENTION: 0

## 2022-10-31 NOTE — PROGRESS NOTES
Physical Therapy  Facility/Department: [facility] PROGRESSIVE CARE  Daily Treatment Note  NAME: Laine Mcknight  : 1940  MRN: [id number]    Date of Service: 10/31/2022    Discharge Recommendations:  Patient would benefit from continued therapy after discharge   PT Equipment Recommendations  Equipment Needed: No    Patient Diagnosis(es): The primary encounter diagnosis was Frequent falls. Diagnoses of Closed fracture of nasal bone, initial encounter, Multiple contusions, and Anxiety were also pertinent to this visit. Assessment   Activity Tolerance: Patient limited by fatigue;Patient limited by endurance  Equipment Needed: No     Plan    Physcial Therapy Plan  General Plan: 5-7 times per week  Specific Instructions for Next Treatment: advance gait distance using wheeled walker and progress to steps, instruct in exercise program  Current Treatment Recommendations: Strengthening;Gait training;Balance training;Functional mobility training;Stair training;Home exercise program;Transfer training; Safety education & training;Patient/Caregiver education & training; Endurance training;Equipment evaluation, education, & procurement  PT Plan of Care:  (5-7 treatments/ week)     Restrictions  Restrictions/Precautions  Restrictions/Precautions: Fall Risk, Up as Tolerated (peripheral IV right antecubital)  Required Braces or Orthoses?: No     Subjective    Subjective  Subjective: \"I feel weak all over even my brain. \"  Pain: denies  Orientation  Overall Orientation Status: Within Functional Limits  Orientation Level: Oriented X4;Oriented to place;Oriented to time;Oriented to situation;Oriented to person  Cognition  Overall Cognitive Status: WFL     Objective   Vitals  O2 Device: None (Room air)  Bed Mobility Training  Bed Mobility Training: Yes  Rolling: Stand-by assistance  Supine to Sit: Stand-by assistance  Scooting: Stand-by assistance  Balance  Sitting: Without support  Standing: With support  Transfer Training  Transfer Training: Yes (with RW)  Interventions: Safety awareness training;Verbal cues;Demonstration  Sit to Stand: Contact-guard assistance  Stand to Sit: Contact-guard assistance  Bed to Chair: Contact-guard assistance  Gait Training  Gait Training: Yes  Gait  Overall Level of Assistance: Contact-guard assistance (CGA for safety)  Interventions: Verbal cues; Safety awareness training;Demonstration  Base of Support: Narrowed  Speed/Jesenia: Slow  Distance (ft): 35 Feet (45 and 30')  Assistive Device: Walker, rolling     PT Exercises  A/AROM Exercises: completed supine bilateral LE exercises x 20 reps for ankle pumps, quad sets, heel slides, hip abd/ add, SAQs and SLR     Safety Devices  Type of Devices: Patient at risk for falls; All fall risk precautions in place; Left in chair;Call light within reach; Chair alarm in place;Nurse notified;Gait belt (nurse Myriam)       Goals  Short Term Goals  Time Frame for Short Term Goals: 5-7 treatments/ week  Short Term Goal 1: pt to demonstrate independent bed mobility on a flat surface  Short Term Goal 2: pt to demonstrate MODIFIED independent transfers using wheeled walker  Short Term Goal 3: pt to demonstrate MODIFIED independent gait 150-200' using wheeled walker for community distances  Short Term Goal 4: pt to demonstrate good ambulatory distance using wheeled walker  Short Term Goal 5: pt to demonstrate ability to ascend/ descend 2 steps w/o rail w/ SBA x 1 for home entry  Short Term Goal 6: pt to demonstrate good technique for exercise program for balance activities and general strengthening exercises  Patient Goals   Patient Goals : return home    Education  Patient Education  Education Given To: Patient  Education Provided: Role of Therapy;Plan of Care  Education Method: Demonstration;Verbal  Education Outcome: Continued education needed    Therapy Time   Individual Concurrent Group Co-treatment   Time In 1327         Time Out 1357         Minutes 30         Timed Code Treatment Minutes: 11 [facility] Mobility Inpatient   How much difficulty turning over in bed?: A Little  How much difficulty sitting down on / standing up from a chair with arms?: A Little  How much difficulty moving from lying on back to sitting on side of bed?: A Little  How much help from another person moving to and from a bed to a chair?: A Little  How much help from another person needed to walk in hospital room?: A Little  How much help from another person for climbing 3-5 steps with a railing?: Total  AM-PAC Inpatient Mobility Raw Score : 16  AM-PAC Inpatient T-Scale Score : 40.78  Mobility Inpatient CMS 0-100% Score: 54.16  Mobility Inpatient CMS G-Code Modifier : ADRIANO Shin PT

## 2022-10-31 NOTE — CARE COORDINATION
Authorization has  for patient. Authorization came back 10-28 and was only good for 48 hours. Patient will need new authorization. JESSICA spoke with admissions at [address] [place] [address] to see if authorization could be extended. Admissions reported that they need updated physical/occupational notes to see if authorization can be restarted. JESSICA spoke with OT, Pablito Bell and requested that OT/PT see patient before 3:00pm.  Pablito Bell reported that OT notes will be in before then, but PT will be after lunch time. JESSICA informed facility. Patient will need both PT/OT notes to restart authorization.  Electronically signed by CURRY Joshi on 10/31/2022 at 11:34 AM

## 2022-10-31 NOTE — PROGRESS NOTES
[facility] Progress Note        10/31/2022   4:12 PM    Name:  Jenni Gao  MRN:    [id number]     [place]:     [de-identified]   Room:  210/2108-  IP Day: 6     Admit Date: 10/25/2022 12:32 PM  PCP: DANDRE Lynne CNP    Subjective:     C/C:   Chief Complaint   Patient presents with    Fall     Hit head, no loc       Interval History: Status: improved. Pt is doing well, still c/o dizziness and weakness. Bowel movements have lessened. Has had only 4 so far today. ROS:   all 10 systems reviewed and are negative except as noted    Review of Systems   Constitutional:  Negative for activity change, appetite change and fever. HENT:  Negative for congestion and trouble swallowing. Respiratory:  Negative for cough, chest tightness and shortness of breath. Cardiovascular:  Negative for chest pain, palpitations and leg swelling. Gastrointestinal:  Positive for diarrhea. Negative for abdominal distention, abdominal pain and nausea. Genitourinary:  Negative for difficulty urinating. Musculoskeletal:  Negative for gait problem. Skin:  Negative for color change. Allergic/Immunologic: Negative. Neurological:  Positive for weakness and light-headedness. Negative for dizziness and tremors. Hematological: Negative. Psychiatric/Behavioral: Negative. Medications: Allergies:    Allergies   Allergen Reactions    Latex Rash    Pcn [Penicillins] Anaphylaxis    Dilaudid [Hydromorphone] Other (See Comments)     Abnormal behaviors    Norco [Hydrocodone-Acetaminophen] Other (See Comments)     Abnormal behavior    Nitrofurantoin Palpitations    Sulfa Antibiotics Rash       Current Meds: warfarin (COUMADIN) tablet 1.25 mg, Once  furosemide (LASIX) tablet 20 mg, Daily  lipase-protease-amylase (ZENPEP) delayed release capsule 10,000 Units, TID WC  0.9 % sodium chloride infusion, Continuous  sodium chloride flush 0.9 % injection 10 mL, PRN  fludrocortisone (FLORINEF) tablet 0.1 mg, Daily  cholestyramine light packet 4 g, BID  midodrine (PROAMATINE) tablet 10 mg, TID  clonazePAM (KLONOPIN) tablet 0.5 mg, Daily PRN  ferrous sulfate (IRON 325) tablet 325 mg, Daily with breakfast  sodium chloride flush 0.9 % injection 10 mL, 2 times per day  sodium chloride flush 0.9 % injection 10 mL, PRN  0.9 % sodium chloride infusion, PRN  potassium chloride (KLOR-CON M) extended release tablet 40 mEq, PRN   Or  potassium bicarb-citric acid (EFFER-K) effervescent tablet 40 mEq, PRN   Or  potassium chloride 10 mEq/100 mL IVPB (Peripheral Line), PRN  magnesium sulfate 1000 mg in dextrose 5% 100 mL IVPB, PRN  ondansetron (ZOFRAN-ODT) disintegrating tablet 4 mg, Q8H PRN   Or  ondansetron (ZOFRAN) injection 4 mg, Q6H PRN  magnesium hydroxide (MILK OF MAGNESIA) 400 MG/5ML suspension 30 mL, Daily PRN  acetaminophen (TYLENOL) tablet 650 mg, Q6H PRN   Or  acetaminophen (TYLENOL) suppository 650 mg, Q6H PRN  ipratropium-albuterol (DUONEB) nebulizer solution 1 ampule, Q4H PRN  warfarin placeholder: dosing by pharmacy, RX Placeholder  levothyroxine (SYNTHROID) tablet 100 mcg, Daily  glucose chewable tablet 16 g, PRN  dextrose bolus 10% 125 mL, PRN   Or  dextrose bolus 10% 250 mL, PRN  glucagon (rDNA) injection 1 mg, PRN  dextrose 10 % infusion, Continuous PRN  insulin lispro (HUMALOG) injection vial 0-4 Units, TID WC  insulin lispro (HUMALOG) injection vial 0-4 Units, Nightly        Data:     Code Status:  Full Code    No family history on file.     Social History     Socioeconomic History    Marital status:      Spouse name: Not on file    Number of children: Not on file    Years of education: Not on file    Highest education level: Not on file   Occupational History    Not on file   Tobacco Use    Smoking status: Never    Smokeless tobacco: Not on file   Substance and Sexual Activity    Alcohol use: No    Drug use: No    Sexual activity: Not on file   Other Topics Concern    Not on file Social History Narrative    Not on file     Social Determinants of Health     Financial Resource Strain: Not on file   Food Insecurity: Not on file   Transportation Needs: Not on file   Physical Activity: Not on file   Stress: Not on file   Social Connections: Not on file   Intimate Partner Violence: Not on file   Housing Stability: Not on file       I/O (24Hr): Intake/Output Summary (Last 24 hours) at 10/31/2022 1612  Last data filed at 10/31/2022 1223  Gross per 24 hour   Intake 1170 ml   Output 250 ml   Net 920 ml     Radiology:  XR ELBOW LEFT (MIN 3 VIEWS)    Result Date: 10/25/2022  1. Mild degenerative changes of the ulnohumeral joint. 2. No acute fracture or dislocation. XR HIP LEFT (2-3 VIEWS)    Result Date: 10/25/2022  No acute osseous abnormality in the bilateral hips or knees. Given the degree of osteopenia, nondisplaced fractures may be radiographically occult. If pain or concern for fracture persists, consider CT or MR imaging. XR HIP RIGHT (2-3 VIEWS)    Result Date: 10/25/2022  No acute osseous abnormality in the bilateral hips or knees. Given the degree of osteopenia, nondisplaced fractures may be radiographically occult. If pain or concern for fracture persists, consider CT or MR imaging. XR KNEE LEFT (3 VIEWS)    Result Date: 10/25/2022  No acute osseous abnormality in the bilateral hips or knees. Given the degree of osteopenia, nondisplaced fractures may be radiographically occult. If pain or concern for fracture persists, consider CT or MR imaging. XR KNEE RIGHT (3 VIEWS)    Result Date: 10/25/2022  No acute osseous abnormality in the bilateral hips or knees. Given the degree of osteopenia, nondisplaced fractures may be radiographically occult. If pain or concern for fracture persists, consider CT or MR imaging. CT HEAD WO CONTRAST    Result Date: 10/25/2022  No acute intracranial abnormality identified by CT.      CT FACIAL BONES WO CONTRAST    Result Date: 10/25/2022  Acute nondisplaced nasal bone fractures bilaterally. CT CERVICAL SPINE WO CONTRAST    Result Date: 10/25/2022  No acute cervical spine fracture. XR CHEST PORTABLE    Result Date: 10/28/2022  No evidence of acute cardiopulmonary disease. 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 No evidence of mass or abnormality in the pancreatectomy bed. Mild hepatic steatosis. No biliary ductal dilatation. Status post cholecystectomy.        Labs:  Recent Results (from the past 24 hour(s))   POC Glucose Fingerstick    Collection Time: 10/30/22  8:35 PM   Result Value Ref Range    POC Glucose 244 (H) 65 - 105 mg/dL   POC Glucose Fingerstick    Collection Time: 10/31/22  7:20 AM   Result Value Ref Range    POC Glucose 270 (H) 65 - 105 mg/dL   Protime-INR    Collection Time: 10/31/22  8:34 AM   Result Value Ref Range    Protime 23.4 (H) 11.8 - 14.6 sec    INR 2.1    Basic Metabolic Panel    Collection Time: 10/31/22  8:34 AM   Result Value Ref Range    Glucose 300 (H) 70 - 99 mg/dL    BUN 10 8 - 23 mg/dL    Creatinine 0.71 0.50 - 0.90 mg/dL    Est, Glom Filt Rate >60 >60 mL/min/1.73m2    Calcium 7.6 (L) 8.6 - 10.4 mg/dL    Sodium 135 135 - 144 mmol/L    Potassium 3.7 3.7 - 5.3 mmol/L    Chloride 101 98 - 107 mmol/L    CO2 26 20 - 31 mmol/L    Anion Gap 8 (L) 9 - 17 mmol/L   CBC with Auto Differential    Collection Time: 10/31/22  8:34 AM   Result Value Ref Range    WBC 4.7 3.5 - 11.0 k/uL    RBC 3.33 (L) 4.0 - 5.2 m/uL    Hemoglobin 9.4 (L) 12.0 - 16.0 g/dL    Hematocrit 28.2 (L) 36 - 46 %    MCV 84.6 80 - 100 fL    MCH 28.2 26 - 34 pg    MCHC 33.3 31 - 37 g/dL    RDW 16.7 (H) 11.5 - 14.9 %    Platelets 680 390 - 240 k/uL    MPV 7.8 6.0 - 12.0 fL    Seg Neutrophils 47 36 - 66 %    Lymphocytes 42 24 - 44 %    Monocytes 8 (H) 1 - 7 %    Eosinophils % 3 0 - 4 %    Basophils 0 0 - 2 %    Segs Absolute 2.21 1.3 - 9.1 k/uL    Absolute Lymph # 1.97 1.0 - 4.8 k/uL    Absolute Mono # 0.38 0.1 - 1.3 k/uL    Absolute Eos # 0.14 0.0 - 0.4 k/uL    Basophils Absolute 0.00 0.0 - 0.2 k/uL    Morphology ANISOCYTOSIS PRESENT     Morphology FEW TARGET CELLS    Hepatic Function Panel    Collection Time: 10/31/22  8:34 AM   Result Value Ref Range    Albumin 2.9 (L) 3.5 - 5.2 g/dL    Alkaline Phosphatase 179 (H) 35 - 104 U/L    ALT 46 (H) 5 - 33 U/L    AST 39 (H) <32 U/L    Total Bilirubin 0.6 0.3 - 1.2 mg/dL    Bilirubin, Direct 0.3 <0.31 mg/dL    Bilirubin, Indirect 0.3 0.00 - 1.00 mg/dL    Total Protein 5.0 (L) 6.4 - 8.3 g/dL   POC Glucose Fingerstick    Collection Time: 10/31/22  3:41 PM   Result Value Ref Range    POC Glucose 348 (H) 65 - 105 mg/dL       Physical Examination:        Vitals:  /64   Pulse (!) 118   Temp 97.8 °F (36.6 °C) (Oral)   Resp 20   Ht 5' (1.524 m)   Wt 114 lb 3.2 oz (51.8 kg)   SpO2 97%   BMI 22.30 kg/m²   Temp (24hrs), Av.6 °F (36.4 °C), Min:97.5 °F (36.4 °C), Max:97.8 °F (36.6 °C)    Recent Labs     10/30/22  1557 10/30/22  2035 10/31/22  0720 10/31/22  1541   POCGLU 159* 244* 270* 348*         Physical Exam  Constitutional:       General: She is not in acute distress. Appearance: Normal appearance. HENT:      Head: Normocephalic. Comments: Bruising around nose      Nose: No congestion. Mouth/Throat:      Mouth: Mucous membranes are moist.   Eyes:      Pupils: Pupils are equal, round, and reactive to light. Cardiovascular:      Rate and Rhythm: Normal rate and regular rhythm. Pulses: Normal pulses. Pulmonary:      Effort: Pulmonary effort is normal. No respiratory distress. Breath sounds: Normal breath sounds. Abdominal:      General: Abdomen is flat. Bowel sounds are normal.      Palpations: Abdomen is soft. Musculoskeletal:         General: No swelling. Skin:     General: Skin is warm and dry. Capillary Refill: Capillary refill takes 2 to 3 seconds.    Neurological:      General: No focal deficit present. Mental Status: She is alert and oriented to person, place, and time. Psychiatric:         Mood and Affect: Mood normal.       Assessment:        Primary Problem  Generalized weakness     Principal Problem:    Generalized weakness  Active Problems:    COPD (chronic obstructive pulmonary disease) (HCC)    Paroxysmal atrial fibrillation (HCC)    Iron deficiency anemia    Hypothyroidism    Closed fracture of nasal bone    Elevated LFTs    Diabetes mellitus ([facility] [address]    Exocrine pancreatic insufficiency    Anemia    Frequent falls  Resolved Problems:    Diarrhea      Past Medical History:   Diagnosis Date    Atrial fibrillation ([facility] [address]     Hypertension     Thyroid disease         Plan:          Frequent falls-  PT/OT for gait training. Patient has nasal bone fracture, needs outpatient follow-up   AFib-  currently on warfarin  Diarrhea - improving. 4 BM's today  Hypotension-  patient is on Florinef and midodrine. will DC IVF   Anemia, patient is on ferrous sulfate, she is currently on warfarin hgb improved 9.4 today. will monitor closely  Hypokalemia-  improved potassium supplements  PT OT eval and treat  OK to dc when dc arrangements made     Electronically signed by DANDRE Gutierres - NP     DANDRE Obrien, CNP  [facility]   [address] [place] [address] Dr Lizette Briceno, [address] [facility] Attending    Pt seen and examined today   I have discussed the care of pt, including pertinent history and exam findings,  with the NP Kayode Martins . I have reviewed the key elements of all parts of the encounter .   I agree with the assessment, plan and orders as documented     Electronically signed by Balbir Felton MD on 10/31/2022 at 10:41 PM

## 2022-10-31 NOTE — PLAN OF CARE
Problem: Discharge Planning  Goal: Discharge to home or other facility with appropriate resources  Outcome: Progressing     Problem: Safety - Adult  Goal: Free from fall injury  Outcome: Progressing  Note: No falls noted this shift. Patient ambulates with x1 staff assistance without difficulty. Bed kept in low position. Safe environment maintained. Bedside table & call light in reach. Uses call light appropriately when needing assistance. Problem: Cardiovascular - Adult  Goal: Maintains optimal cardiac output and hemodynamic stability  Outcome: Progressing  Note: Patient maintained BP WDL throughout shift. Problem: Pain  Goal: Verbalizes/displays adequate comfort level or baseline comfort level  Outcome: Progressing  Note: No pain at this time. 0/10 pain scale. Problem: Skin/Tissue Integrity  Goal: Absence of new skin breakdown  Description: 1. Monitor for areas of redness and/or skin breakdown  2. Assess vascular access sites hourly  3. Every 4-6 hours minimum:  Change oxygen saturation probe site  4. Every 4-6 hours:  If on nasal continuous positive airway pressure, respiratory therapy assess nares and determine need for appliance change or resting period. Outcome: Progressing  Note: Multiple skin abnormalities noted upon assessment of pt. No new skin breakdown evident. Will continue to monitor for changes in skin integrity.

## 2022-10-31 NOTE — PROGRESS NOTES
Pharmacy Note  Warfarin Consult follow-up      Recent Labs     10/29/22  0456 10/30/22  0626 10/31/22  0834   INR 2.6 2.2 2.1     Recent Labs     10/29/22  0456 10/30/22  0626 10/31/22  0834   HGB 7.3* 9.2* 9.4*   HCT 22.1* 27.6* 28.2*    235 221       Significant Drug-Drug Interactions:  New warfarin drug-drug interactions: none   Discontinued drug-drug interactions: none   Current warfarin drug-drug interactions: cholestyramine, florinef      Date             INR        Dose given previous day  Dose scheduled for today  10/31/2022            2.1 (goal 2-3)       1 mg            1.25 mg     Notes:                   INR therapeutic at 2.1 (goal 2-3). Will give home dose of 1.25 mg. Hgb and platelets stable. Daily PT/INR while inpatient.      Jasmyne Farris PharmD, BCPS  10/31/2022 9:55 AM

## 2022-10-31 NOTE — PLAN OF CARE
Problem: Discharge Planning  Goal: Discharge to home or other facility with appropriate resources  10/31/2022 1046 by Sharmaine Winters RN  Outcome: Completed  10/31/2022 0355 by Luis Jj RN  Outcome: Progressing     Problem: Safety - Adult  Goal: Free from fall injury  10/31/2022 1046 by Sharmaine Winters RN  Outcome: Completed  10/31/2022 0355 by Luis Jj RN  Outcome: Progressing  Note: No falls noted this shift. Patient ambulates with x1 staff assistance without difficulty. Bed kept in low position. Safe environment maintained. Bedside table & call light in reach. Uses call light appropriately when needing assistance. Problem: ABCDS Injury Assessment  Goal: Absence of physical injury  Outcome: Completed     Problem: Cardiovascular - Adult  Goal: Maintains optimal cardiac output and hemodynamic stability  10/31/2022 1046 by Sharmaine Winters RN  Outcome: Completed  10/31/2022 0355 by Luis Jj RN  Outcome: Progressing  Note: Patient maintained BP WDL throughout shift. Goal: Absence of cardiac dysrhythmias or at baseline  Outcome: Completed     Problem: Chronic Conditions and Co-morbidities  Goal: Patient's chronic conditions and co-morbidity symptoms are monitored and maintained or improved  Outcome: Completed     Problem: Nutrition Deficit:  Goal: Optimize nutritional status  Outcome: Completed     Problem: Pain  Goal: Verbalizes/displays adequate comfort level or baseline comfort level  10/31/2022 1046 by Sharmaine Winters RN  Outcome: Completed  10/31/2022 0355 by Luis Jj RN  Outcome: Progressing  Note: No pain at this time. 0/10 pain scale. Problem: Skin/Tissue Integrity  Goal: Absence of new skin breakdown  10/31/2022 1046 by Sharmaine Winters RN  Outcome: Completed  10/31/2022 0355 by Luis Jj RN  Outcome: Progressing  Note: Multiple skin abnormalities noted upon assessment of pt. No new skin breakdown evident. Will continue to monitor for changes in skin integrity.

## 2022-10-31 NOTE — PROGRESS NOTES
GI Progress notes    10/31/2022   12:10 PM    Name:  Kaia Nunez  MRN:    [id number]     [place]:     [de-identified]   Room:  [facility]/[address] Day: 6     Admit Date: 10/25/2022 12:32 PM  PCP: DANDRE Alvarado CNP    Subjective:     C/C:   Chief Complaint   Patient presents with    Fall     Hit head, no loc       Interval History: Status: improved. Patient seen and examined  No acute events overnight  Clinically doing well  Tolerating diet well  BM more formed. Had 1 BM so far today. Urine copper and AAT with phenotype pending    ROS:  Constitutional: negative for chills, fevers and sweats  Gastrointestinal: negative for abdominal pain, constipation, diarrhea, nausea and vomiting  Neurological: negative for dizziness and headaches    Medications: Allergies:    Allergies   Allergen Reactions    Latex Rash    Pcn [Penicillins] Anaphylaxis    Dilaudid [Hydromorphone] Other (See Comments)     Abnormal behaviors    Norco [Hydrocodone-Acetaminophen] Other (See Comments)     Abnormal behavior    Nitrofurantoin Palpitations    Sulfa Antibiotics Rash       Current Meds: warfarin (COUMADIN) tablet 1.25 mg, Once  lipase-protease-amylase (ZENPEP) delayed release capsule 10,000 Units, TID WC  0.9 % sodium chloride infusion, Continuous  sodium chloride flush 0.9 % injection 10 mL, PRN  fludrocortisone (FLORINEF) tablet 0.1 mg, Daily  cholestyramine light packet 4 g, BID  midodrine (PROAMATINE) tablet 10 mg, TID  clonazePAM (KLONOPIN) tablet 0.5 mg, Daily PRN  ferrous sulfate (IRON 325) tablet 325 mg, Daily with breakfast  sodium chloride flush 0.9 % injection 10 mL, 2 times per day  sodium chloride flush 0.9 % injection 10 mL, PRN  0.9 % sodium chloride infusion, PRN  potassium chloride (KLOR-CON M) extended release tablet 40 mEq, PRN   Or  potassium bicarb-citric acid (EFFER-K) effervescent tablet 40 mEq, PRN   Or  potassium chloride 10 mEq/100 mL IVPB (Peripheral Line), PRN  magnesium sulfate 1000 mg in dextrose 5% 100 mL IVPB, PRN  ondansetron (ZOFRAN-ODT) disintegrating tablet 4 mg, Q8H PRN   Or  ondansetron (ZOFRAN) injection 4 mg, Q6H PRN  magnesium hydroxide (MILK OF MAGNESIA) 400 MG/5ML suspension 30 mL, Daily PRN  acetaminophen (TYLENOL) tablet 650 mg, Q6H PRN   Or  acetaminophen (TYLENOL) suppository 650 mg, Q6H PRN  ipratropium-albuterol (DUONEB) nebulizer solution 1 ampule, Q4H PRN  warfarin placeholder: dosing by pharmacy, RX Placeholder  levothyroxine (SYNTHROID) tablet 100 mcg, Daily  glucose chewable tablet 16 g, PRN  dextrose bolus 10% 125 mL, PRN   Or  dextrose bolus 10% 250 mL, PRN  glucagon (rDNA) injection 1 mg, PRN  dextrose 10 % infusion, Continuous PRN  insulin lispro (HUMALOG) injection vial 0-4 Units, TID WC  insulin lispro (HUMALOG) injection vial 0-4 Units, Nightly        Data:     Code Status:  Full Code    No family history on file.     Social History     Socioeconomic History    Marital status:      Spouse name: Not on file    Number of children: Not on file    Years of education: Not on file    Highest education level: Not on file   Occupational History    Not on file   Tobacco Use    Smoking status: Never    Smokeless tobacco: Not on file   Substance and Sexual Activity    Alcohol use: No    Drug use: No    Sexual activity: Not on file   Other Topics Concern    Not on file   Social History Narrative    Not on file     Social Determinants of Health     Financial Resource Strain: Not on file   Food Insecurity: Not on file   Transportation Needs: Not on file   Physical Activity: Not on file   Stress: Not on file   Social Connections: Not on file   Intimate Partner Violence: Not on file   Housing Stability: Not on file       Vitals:  /62   Pulse 82   Temp 97.6 °F (36.4 °C) (Oral)   Resp 18   Ht 5' (1.524 m)   Wt 114 lb 3.2 oz (51.8 kg)   SpO2 96%   BMI 22.30 kg/m²   Temp (24hrs), Av.6 °F (36.4 °C), Min:97.5 °F (36.4 °C), Max:97.8 °F (36.6 °C)    Recent Labs 10/30/22  1102 10/30/22  1557 10/30/22  2035 10/31/22  0720   POCGLU 296* 159* 244* 270*       I/O (24Hr):     Intake/Output Summary (Last 24 hours) at 10/31/2022 1210  Last data filed at 10/31/2022 0724  Gross per 24 hour   Intake 1190 ml   Output 700 ml   Net 490 ml       Labs:      CBC:   Lab Results   Component Value Date/Time    WBC 4.7 10/31/2022 08:34 AM    RBC 3.33 10/31/2022 08:34 AM    RBC 4.42 03/21/2012 01:10 PM    HGB 9.4 10/31/2022 08:34 AM    HCT 28.2 10/31/2022 08:34 AM    MCV 84.6 10/31/2022 08:34 AM    MCH 28.2 10/31/2022 08:34 AM    MCHC 33.3 10/31/2022 08:34 AM    RDW 16.7 10/31/2022 08:34 AM     10/31/2022 08:34 AM     03/21/2012 01:10 PM    MPV 7.8 10/31/2022 08:34 AM     CBC with Differential:    Lab Results   Component Value Date/Time    WBC 4.7 10/31/2022 08:34 AM    RBC 3.33 10/31/2022 08:34 AM    RBC 4.42 03/21/2012 01:10 PM    HGB 9.4 10/31/2022 08:34 AM    HCT 28.2 10/31/2022 08:34 AM     10/31/2022 08:34 AM     03/21/2012 01:10 PM    MCV 84.6 10/31/2022 08:34 AM    MCH 28.2 10/31/2022 08:34 AM    MCHC 33.3 10/31/2022 08:34 AM    RDW 16.7 10/31/2022 08:34 AM    LYMPHOPCT 42 10/31/2022 08:34 AM    MONOPCT 8 10/31/2022 08:34 AM    BASOPCT 0 10/31/2022 08:34 AM    MONOSABS 0.38 10/31/2022 08:34 AM    LYMPHSABS 1.97 10/31/2022 08:34 AM    EOSABS 0.14 10/31/2022 08:34 AM    BASOSABS 0.00 10/31/2022 08:34 AM    DIFFTYPE NOT REPORTED 09/13/2017 04:50 PM     Hemoglobin/Hematocrit:    Lab Results   Component Value Date/Time    HGB 9.4 10/31/2022 08:34 AM    HCT 28.2 10/31/2022 08:34 AM     CMP:    Lab Results   Component Value Date/Time     10/31/2022 08:34 AM    K 3.7 10/31/2022 08:34 AM     10/31/2022 08:34 AM    CO2 26 10/31/2022 08:34 AM    BUN 10 10/31/2022 08:34 AM    CREATININE 0.71 10/31/2022 08:34 AM    GFRAA >60 08/11/2022 09:05 AM    LABGLOM >60 10/31/2022 08:34 AM    GLUCOSE 300 10/31/2022 08:34 AM    PROT 5.0 10/31/2022 08:34 AM    LABALBU 2.9 10/31/2022 08:34 AM    CALCIUM 7.6 10/31/2022 08:34 AM    BILITOT 0.6 10/31/2022 08:34 AM    ALKPHOS 179 10/31/2022 08:34 AM    AST 39 10/31/2022 08:34 AM    ALT 46 10/31/2022 08:34 AM     BMP:    Lab Results   Component Value Date/Time     10/31/2022 08:34 AM    K 3.7 10/31/2022 08:34 AM     10/31/2022 08:34 AM    CO2 26 10/31/2022 08:34 AM    BUN 10 10/31/2022 08:34 AM    LABALBU 2.9 10/31/2022 08:34 AM    CREATININE 0.71 10/31/2022 08:34 AM    CALCIUM 7.6 10/31/2022 08:34 AM    GFRAA >60 08/11/2022 09:05 AM    LABGLOM >60 10/31/2022 08:34 AM    GLUCOSE 300 10/31/2022 08:34 AM     PT/INR:    Lab Results   Component Value Date/Time    PROTIME 23.4 10/31/2022 08:34 AM    INR 2.1 10/31/2022 08:34 AM     PTT:    Lab Results   Component Value Date/Time    APTT 35.1 10/25/2022 12:53 PM   [APTT}    Physical Examination:        General appearance: alert, cooperative and no distress  Mental Status: oriented to person, place and time and normal affect  Abdomen: soft, nontender, nondistended, bowel sounds present   Extremities: no edema, redness or tenderness in the calves  Skin: no gross lesions, rashes, or induration    Assessment:        Primary Problem  Generalized weakness     Active Hospital Problems    Diagnosis Date Noted    Diarrhea [R19.7] 10/27/2022     Priority: Medium    Anemia [D64.9] 10/27/2022     Priority: Medium    Frequent falls [R29.6] 10/27/2022     Priority: Medium    Elevated LFTs [R79.89] 10/26/2022     Priority: Medium    Diabetes mellitus ([address] [E11.9] 10/26/2022     Priority: Medium    Exocrine pancreatic insufficiency [K86.81] 10/26/2022     Priority: Medium    Generalized weakness [R53.1] 10/25/2022     Priority: Medium    Iron deficiency anemia [D50.9] 10/25/2022     Priority: Medium    Hypothyroidism [E03.9] 10/25/2022     Priority: Medium    Closed fracture of nasal bone [S02. 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 Liver disease work-up ongoing  Ceruloplasmin low  24 urine copper  AAT deficiency  Add phenotype  May d/c per GI and follow-up outpatient  GI signing off    Time spent reviewing the chart, seeing the patient, and discussing with the attending MD around 30 minutes. Explained to the patient and d/W Nursing Staff  Will F/U with you  Please call or Page for any issues or change in status  Thanks    Electronically signed by DANDRE Lawson NP on 10/31/2022 at 12:10 PM       GI attending physician note. Patient seen with DANDRE     I independently performed an evaluation on [facility]. I have reviewed the above documentation completed by the Nurse Practitioner and agree with the history, examination, and management plan. Recommendations discussed.     's some of the labs pending. Patient appetite improved. Frequency of bowel movements markedly decreased. Overall she is feeling better. From GI point of view may be discharged and advised to follow-up with physician at [place] and also with her family physician.   If she has any further GI issues, and wishes to follow-up with our office, she is advised to contact the office as an outpatient

## 2022-10-31 NOTE — PROGRESS NOTES
[address]   INPATIENT OCCUPATIONAL THERAPY  PROGRESS NOTE  Date: 10/31/2022  Patient Name: Laura Navarro       Room:   MRN: [id number]    : 1940  ([de-identified] y.o.)  Gender: female   Referring Practitioner: Radha Sow MD  Diagnosis: Generalized weakness, multiple contusions, frequent falls, closed fracture of nasal bone    Restrictions/Precautions  Restrictions/Precautions  Restrictions/Precautions: Fall Risk;Up as Tolerated (peripheral IV right antecubital)  Required Braces or Orthoses?: No    Vitals  BP Location: Right upper arm  O2 Device: None (Room air)    Subjective  Subjective  Subjective: \"my legs are getting wobbly and I think that I need to geyt back to bed. .\" pt states this after standing at sink x 4 minutes for grooming tasks. BUE trembling noted  Subjective  Pain: pt denies pain  Comments: pt alert and cooperative    Objective  Orientation  Overall Orientation Status: Within Functional Limits  Orientation Level: Oriented X4  Cognition  Overall Cognitive Status: WFL  ADL  Feeding: Setup  Grooming: Contact guard assistance  Grooming Skilled Clinical Factors: Standing at sink to wash face/hands  UE Bathing: Contact guard assistance  UE Bathing Skilled Clinical Factors: Standing at sink  LE Bathing: Contact guard assistance  UE Dressing: Stand by assistance  LE Dressing: Stand by assistance (pt brings foot to opposite knee to doff/paty socks while long sitting in bed)  Toileting: Contact guard assistance  Additional Comments: ADL scores based on skilled observations and clinical reasoning unless otherwise noted. pt requires multiple RB 2* fatigue, 1 minor posterior LOB while washing face. pt self correct. Patient reports recent falls at home. pt educated on AE/DME to increase safety and independence with ADLs and trandfers. ie reacher, shower chair, LH sponge.     IADL Comments: sleeps in a flat bed  Additional Comments: states that she has no one to call to assist if needed    Balance  Balance  Sitting Balance: Stand by assistance  Standing Balance: Contact guard assistance  Standing Balance  Time: ~4 minutes, 3 minutes  Activity: functional mobility and ADLs. Comment: 1 minor posterior LOB while washing face. pt self correct. Transfers/Mobility  Bed mobility  Rolling to Left: Stand by assistance  Supine to Sit: Stand by assistance  Sit to Supine: Stand by assistance  Scooting: Stand by assistance (to HOB/EOB)  Transfers  Sit to stand: Contact guard assistance  Stand to sit: Contact guard assistance  Transfer Comments: with VCs for hand placement and safety. F return noted  Toilet Transfers  Toilet - Technique: Ambulating  Equipment Used: Grab bars  Toilet Transfer: Contact guard assistance  Toilet Transfers Comments: with RW, per pt report    Functional Mobility  Functional - Mobility Device: Rolling Walker  Activity: To/from bathroom; Other (to/from doorway and back)  Assist Level: Contact guard assistance  Functional Mobility Comments: with Minimal verbal cues for safety      Patient Education  Patient Education  Education Given To: Patient  Education Provided: Plan of Care, Precautions, ADL Adaptive Strategies, Transfer Training, Energy Conservation, Fall Prevention Strategies (home safety/fall prevention, AE/DME)  Education Method: Demonstration, Verbal  Barriers to Learning: None      Goals  Patient Goals   Patient goals : To return home  Short Term Goals  Time Frame for Short Term Goals: By discharge  Short Term Goal 1: Patient will verbalize/demonstrate Good understanding of Fall Prevention Strategies to promote increased IND with self-care and mobility. Short Term Goal 2: Patient will verbalize/demonstrate Good understanding of assistive equipment/durable medical equipment/modified techniques for increased IND with self-care and mobility. Short Term Goal 3: Patient will perform BADLs with SBA and Good safety.   Short Term Goal 4: Patient will perform functional mobility and transfers during self-care with SBA, RW, and Good safety. Short Term Goal 5: Patient will actively participate in 15-25 minutes of therapeutic exercise/functional activities to promote increased IND with self-care and mobility. Occupational Therapy Plan  Times Per Week: 4-6  Times Per Day: Once a day  Current Treatment Recommendations: Self-Care / ADL, Home management training, Strengthening, Balance training, Functional mobility training, Endurance training, Safety education & training, Patient/Caregiver education & training, Equipment evaluation, education, & procurement      Assessment  Activity Tolerance  Activity Tolerance: Patient Tolerated treatment well, Patient limited by fatigue (limited by decreased endurance)  Assessment  Performance deficits / Impairments: Decreased functional mobility , Decreased ADL status, Decreased strength, Decreased safe awareness, Decreased endurance, Decreased balance, Decreased high-level IADLs, Decreased fine motor control  Treatment Diagnosis: Impaired self-care status. Prognosis: Good  Decision Making: Medium Complexity  Discharge Recommendations: Patient would benefit from continued therapy after discharge  Safety Devices  Type of Devices: Patient at risk for falls, All fall risk precautions in place, Left in bed, Call light within reach, bed alarm on.       AM-[facility] Daily Activities Inpatient  AM-PAC Daily Activity Inpatient   How much help for putting on and taking off regular lower body clothing?: A Little  How much help for Bathing?: A Little  How much help for Toileting?: A Little  How much help for putting on and taking off regular upper body clothing?: A Little  How much help for taking care of personal grooming?: A Little  How much help for eating meals?: None  AM-[facility] Inpatient Daily Activity Raw Score: 19  AM-PAC Inpatient ADL T-Scale Score : 40.22  ADL Inpatient CMS 0-100% Score: 42.8  ADL Inpatient CMS G-Code Modifier : CK    OT Minutes  OT Individual Minutes  Time In: 1110  Time Out: [address] [facility]  Minutes: [address] [facility], \[facility]\""

## 2022-11-01 VITALS
RESPIRATION RATE: 16 BRPM | HEART RATE: 86 BPM | TEMPERATURE: 97.8 F | DIASTOLIC BLOOD PRESSURE: 50 MMHG | HEIGHT: 60 IN | SYSTOLIC BLOOD PRESSURE: 105 MMHG | BODY MASS INDEX: 21.55 KG/M2 | OXYGEN SATURATION: 98 % | WEIGHT: 109.79 LBS

## 2022-11-01 LAB
GLUCOSE BLD-MCNC: 154 MG/DL (ref 65–105)
INR BLD: 2.3
PROTHROMBIN TIME: 25.4 SEC (ref 11.8–14.6)

## 2022-11-01 PROCEDURE — 2580000003 HC RX 258: Performed by: FAMILY MEDICINE

## 2022-11-01 PROCEDURE — 83520 IMMUNOASSAY QUANT NOS NONAB: CPT

## 2022-11-01 PROCEDURE — 6370000000 HC RX 637 (ALT 250 FOR IP): Performed by: FAMILY MEDICINE

## 2022-11-01 PROCEDURE — 82705 FATS/LIPIDS FECES QUAL: CPT

## 2022-11-01 PROCEDURE — 85610 PROTHROMBIN TIME: CPT

## 2022-11-01 PROCEDURE — 6370000000 HC RX 637 (ALT 250 FOR IP): Performed by: NURSE PRACTITIONER

## 2022-11-01 PROCEDURE — 36415 COLL VENOUS BLD VENIPUNCTURE: CPT

## 2022-11-01 PROCEDURE — 97530 THERAPEUTIC ACTIVITIES: CPT

## 2022-11-01 PROCEDURE — 82947 ASSAY GLUCOSE BLOOD QUANT: CPT

## 2022-11-01 RX ORDER — WARFARIN SODIUM 2.5 MG/1
1.25 TABLET ORAL
Status: DISCONTINUED | OUTPATIENT
Start: 2022-11-01 | End: 2022-11-01 | Stop reason: HOSPADM

## 2022-11-01 RX ADMIN — SODIUM CHLORIDE, PRESERVATIVE FREE 10 ML: 5 INJECTION INTRAVENOUS at 08:00

## 2022-11-01 RX ADMIN — LEVOTHYROXINE SODIUM 100 MCG: 0.1 TABLET ORAL at 06:08

## 2022-11-01 RX ADMIN — FLUDROCORTISONE ACETATE 0.1 MG: 0.1 TABLET ORAL at 09:00

## 2022-11-01 RX ADMIN — MIDODRINE HYDROCHLORIDE 10 MG: 10 TABLET ORAL at 14:12

## 2022-11-01 RX ADMIN — CHOLESTYRAMINE 4 G: 4 POWDER, FOR SUSPENSION ORAL at 12:14

## 2022-11-01 RX ADMIN — FUROSEMIDE 20 MG: 20 TABLET ORAL at 08:27

## 2022-11-01 RX ADMIN — MIDODRINE HYDROCHLORIDE 10 MG: 10 TABLET ORAL at 08:27

## 2022-11-01 RX ADMIN — FERROUS SULFATE TAB 325 MG (65 MG ELEMENTAL FE) 325 MG: 325 (65 FE) TAB at 08:27

## 2022-11-01 RX ADMIN — PANCRELIPASE LIPASE, PANCRELIPASE PROTEASE, PANCRELIPASE AMYLASE 10000 UNITS: 5000; 17000; 24000 CAPSULE, DELAYED RELEASE ORAL at 12:10

## 2022-11-01 ASSESSMENT — PAIN SCALES - GENERAL: PAINLEVEL_OUTOF10: 0

## 2022-11-01 NOTE — PLAN OF CARE
Problem: ABCDS Injury Assessment  Goal: Absence of physical injury  Recent Flowsheet Documentation  Taken 11/1/2022 0800 by Demetria Seip, RN  Absence of Physical Injury: Implement safety measures based on patient assessment     Problem: Nutrition Deficit:  Goal: Optimize nutritional status  Recent Flowsheet Documentation  Taken 11/1/2022 1115 by Ana Cristina Fisher RD, LD  Nutrient intake appropriate for improving, restoring, or maintaining nutritional needs: Monitor oral intake, labs, and treatment plans     Resolved careplan. Pt being discharged.

## 2022-11-01 NOTE — DISCHARGE INSTR - DIET
Good nutrition is important when healing from an illness, injury, or surgery. Follow any nutrition recommendations given to you during your hospital stay. If you were given an oral nutrition supplement while in the hospital, continue to take this supplement at home. You can take it with meals, in-between meals, and/or before bedtime. These supplements can be purchased at most local grocery stores, pharmacies, and chain [employer]-stores. If you have any questions about your diet or nutrition, call the hospital and ask for the dietitian. Regular 4 carb choices diet.

## 2022-11-01 NOTE — PROGRESS NOTES
Comprehensive Nutrition Assessment    Type and Reason for Visit:  Reassess    Nutrition Recommendations/Plan:   Continue diet as ordered. Malnutrition Assessment:  Malnutrition Status: At risk for malnutrition (Comment) (10/26/22 7796)    Context:  Acute Illness     Findings of the 6 clinical characteristics of malnutrition:  Energy Intake:  No significant decrease in energy intake  Weight Loss:  No significant weight loss (not recently per pt)     Body Fat Loss:   (frail appearance)     Muscle Mass Loss:  Unable to assess    Fluid Accumulation:  No significant fluid accumulation     Strength:  Not Performed    Nutrition Assessment:    Nurse reports pt has been eating much better but protein levels are low and requested pt get a bottle of Proteinex to drink. Discharge planned for today. Nutrition Related Findings:    No edema. Labs & meds reviewed. Wound Type: None       Current Nutrition Intake & Therapies:    Average Meal Intake: %  Average Supplements Intake: Refusing to take  ADULT DIET; Regular; 4 carb choices (60 gm/meal)    Anthropometric Measures:  Height: 5' (152.4 cm)  Ideal Body Weight (IBW): 100 lbs (45 kg)    Admission Body Weight: 106 lb (48.1 kg)  Current Body Weight: 109 lb (49.4 kg), 106 % IBW.  Weight Source: Bed Scale  Current BMI (kg/m2): 21.3                          BMI Categories: Underweight (BMI less than 22) age over 72    Estimated Daily Nutrient Needs:  Energy Requirements Based On: Kcal/kg  Weight Used for Energy Requirements: Admission  Energy (kcal/day): 1680 kcals based on 35 kcals/kg  Weight Used for Protein Requirements: Admission  Protein (g/day): 58 gm protein based on 1.2 gm/kg         Nutrition Diagnosis:   Underweight related to altered GI function as evidenced by weight loss    Nutrition Interventions:   Food and/or Nutrient Delivery: Continue Current Diet  Nutrition Education/Counseling: No recommendation at this time  Coordination of Nutrition Care: Continue to monitor while inpatient       Goals:  Previous Goal Met: Progressing toward Goal(s)  Goals: PO intake 50% or greater       Nutrition Monitoring and Evaluation:   Behavioral-Environmental Outcomes: None Identified  Food/Nutrient Intake Outcomes: Food and Nutrient Intake  Physical Signs/Symptoms Outcomes: Biochemical Data, GI Status, Nutrition Focused Physical Findings, Skin, Weight    Discharge Planning:    Continue current diet     Maria T Willis, [address] [place] [address], [facility]

## 2022-11-01 NOTE — PROGRESS NOTES
Pt discharged via wheelchair by Eduardo Meade. All belongings including her glucometer, phone and  taken with her. She was A/O x4 and in no distress. Report given to [employer] at [address] All questions were answered.

## 2022-11-01 NOTE — PROGRESS NOTES
[address] [facility]   INPATIENT OCCUPATIONAL THERAPY  PROGRESS NOTE  Date: 2022  Patient Name: Driss Montano       Room: -  MRN: [id number]    : 1940  ([de-identified] y.o.)  Gender: female   Referring Practitioner: Elizabeth Miner MD  Diagnosis: Generalized weakness, multiple contusions, frequent falls, closed fracture of nasal bone    Restrictions/Precautions  Restrictions/Precautions  Restrictions/Precautions: Fall Risk;Up as Tolerated  Required Braces or Orthoses?: No     Subjective  Subjective  Subjective: \"I feel like a prisoner here\" Pt with agitation regarding being in hospital and not being able to get up on own. Subjective  Pain: denies  Comments: Pt cooperative to tolerance with encouargement. Objective  Orientation  Overall Orientation Status: Within Functional Limits  Cognition  Overall Cognitive Status: Exceptions  Safety Judgement: Decreased awareness of need for assistance;Decreased awareness of need for safety  Insights: Decreased awareness of deficits  ADL  Feeding: Modified independent   Feeding Skilled Clinical Factors: per pt report  Grooming: Setup  Grooming Skilled Clinical Factors: per pt report. UE Bathing: Setup  UE Bathing Skilled Clinical Factors: per pt report. LE Bathing: Contact guard assistance  UE Dressing: Stand by assistance  LE Dressing: Minimal assistance  LE Dressing Skilled Clinical Factors: Assist to paty B jobst stockings, CGA based on clincal reasoning for all other tasks. Toileting: Contact guard assistance  Additional Comments: ADL scores based on skilled observations and clinical reasoning unless otherwise noted; pt declines participation with self care taks encouraged. Patient reports recent falls at home. writer reinforced pt on AE/DME to increase safety and independence with ADLs and trandfers. ie reacher, shower chair, LH sponge.  Pt with noted decreased reception and awareness to need for use of AE.    Balance  Balance  Sitting Balance: Stand by assistance  Standing Balance: Contact guard assistance  Standing Balance  Time: ~5 min  Activity: functional mobility and transfers in room to faciliate household mobility for ADL/IADL tasks. Transfers/Mobility  Bed mobility  Supine to Sit: Supervision  Transfers  Sit to stand: Contact guard assistance  Stand to sit: Contact guard assistance  Transfer Comments: with VCs for hand placement and safety. F return noted    Functional Mobility  Functional - Mobility Device: Rolling Walker  Activity: Other (within room and hallway)  Assist Level: Contact guard assistance  Functional Mobility Comments: minor LOB with self recovery. Patient Education  Patient Education  Education Given To: Patient  Education Provided: Plan of Care, Precautions, ADL Adaptive Strategies, Transfer Training, Energy Conservation, Fall Prevention Strategies  Education Method: Demonstration, Verbal  Barriers to Learning: None  Education Outcome: Continued education needed      Goals  Patient Goals   Patient goals : To return home  Short Term Goals  Time Frame for Short Term Goals: By discharge  Short Term Goal 1: Patient will verbalize/demonstrate Good understanding of Fall Prevention Strategies to promote increased IND with self-care and mobility. Short Term Goal 2: Patient will verbalize/demonstrate Good understanding of assistive equipment/durable medical equipment/modified techniques for increased IND with self-care and mobility. Short Term Goal 3: Patient will perform BADLs with SBA and Good safety. Short Term Goal 4: Patient will perform functional mobility and transfers during self-care with SBA, RW, and Good safety. Short Term Goal 5: Patient will actively participate in 15-25 minutes of therapeutic exercise/functional activities to promote increased IND with self-care and mobility. Occupational Therapy Plan  Times Per Week: 4-6  Times Per Day:  Once a day  Current Treatment Recommendations: Self-Care / ADL, Home management training, Strengthening, Balance training, Functional mobility training, Endurance training, Safety education & training, Patient/Caregiver education & training, Equipment evaluation, education, & procurement      Assessment  Activity Tolerance  Activity Tolerance: Patient Tolerated treatment well, Patient limited by fatigue (limited by decreased endurance)  Assessment  Performance deficits / Impairments: Decreased functional mobility , Decreased ADL status, Decreased strength, Decreased safe awareness, Decreased endurance, Decreased balance, Decreased high-level IADLs, Decreased fine motor control  Treatment Diagnosis: Impaired self-care status.   Prognosis: Good  Decision Making: Medium Complexity  Discharge Recommendations: Patient would benefit from continued therapy after discharge  Safety Devices  Type of Devices: Patient at risk for falls, All fall risk precautions in place, Left in chair, Call light within reach, Chair alarm in place, Nurse notified, Gait belt      AM-PAC Daily Activities Inpatient  AM-PAC Daily Activity Inpatient   How much help for putting on and taking off regular lower body clothing?: A Little  How much help for Bathing?: A Little  How much help for Toileting?: A Little  How much help for putting on and taking off regular upper body clothing?: A Little  How much help for taking care of personal grooming?: A Little  How much help for eating meals?: None  AM-PAC Inpatient Daily Activity Raw Score: 19  AM-PAC Inpatient ADL T-Scale Score : 40.22  ADL Inpatient CMS 0-100% Score: 42.8  ADL Inpatient CMS G-Code Modifier : CK    OT Minutes  OT Individual Minutes  Time In: 1117  Time Out: 1148  Minutes: 31      Electronically signed by BHASKAR Duque on 11/1/2022 at 1:46 PM

## 2022-11-01 NOTE — DISCHARGE SUMMARY
Discharge Summary    NAME:  Roxi Kim  :  1940  MRN:  [id number]    Admit date:  10/25/2022  Discharge date:  2022    Admitting Physician:  Ishan Bronson MD    Primary Diagnosis on Admission:   Present on Admission:   Generalized weakness   COPD (chronic obstructive pulmonary disease) (HCC)   Paroxysmal atrial fibrillation (HCC)   Iron deficiency anemia   Hypothyroidism   Closed fracture of nasal bone   Elevated LFTs   Diabetes mellitus ([address]   Exocrine pancreatic insufficiency   (Resolved) Diarrhea   Anemia   Frequent falls      Secondary Diagnoses:    Nasal fracture      Admission Condition:  poor     Discharged Condition: good    Hospital Course: The patient was admitted for the management of mechanical fall where she was walking tripped over her cane. Patient fell on her knees and then her face. Patient denies loss of consciousness. Patient also had dizziness. She was positive for orthostatic hypotension,Jobst stockings bilateral knee-high. TSH was 37.09 synthroid increased. She has been experiencing diarrhea   Today on day of discharge pt feels better with no further complaints. Vitals and Labs are at pts baseline. All consultants involved during this admission are agreeable to d/c.     Consults:  cardiology    Significant Diagnostic/theraputic interventions:       Disposition:   SNF         Follow up with DANDRE Power CNP in   week    Discharge Medications:       Medication List        START taking these medications      fludrocortisone 0.1 MG tablet  Commonly known as: FLORINEF  Take 1 tablet by mouth daily     glucose 4 g chewable tablet  Take 4 tablets by mouth as needed for Low blood sugar     ipratropium-albuterol 0.5-2.5 (3) MG/3ML Soln nebulizer solution  Commonly known as: DUONEB  Inhale 3 mLs into the lungs every 4 hours as needed for Shortness of Breath     lipase-protease-amylase 5000-83120 units Cpep delayed release capsule  Commonly known as: ZENPEP  Take by mouth 3 times daily (with meals)  Replaces: lipase-protease-amylase 6000-81494 units delayed release capsule     midodrine 10 MG tablet  Commonly known as: PROAMATINE  Take 1 tablet by mouth 3 times daily     ondansetron 4 MG disintegrating tablet  Commonly known as: ZOFRAN-ODT  Take 1 tablet by mouth every 8 hours as needed for Nausea or Vomiting            CHANGE how you take these medications      clonazePAM 0.5 MG tablet  Commonly known as: KLONOPIN  Take 1 tablet by mouth daily as needed for Anxiety for up to 5 days.   What changed:   when to take this  reasons to take this     ferrous sulfate 325 (65 Fe) MG tablet  Commonly known as: IRON 325  Take 1 tablet by mouth daily (with breakfast)  What changed: when to take this     furosemide 20 MG tablet  Commonly known as: LASIX  Take 1 tablet by mouth daily Times  one week if diarrhea continues to improve then increase to 40 mg  What changed:   medication strength  how much to take  additional instructions     levothyroxine 100 MCG tablet  Commonly known as: SYNTHROID  Take 1 tablet by mouth Daily  What changed:   medication strength  how much to take            CONTINUE taking these medications      HumaLOG KwikPen 100 UNIT/ML Sopn  Generic drug: insulin lispro (1 Unit Dial)     insulin  UNIT/ML injection vial  Commonly known as: HUMULIN N;NOVOLIN N     metoprolol succinate 50 MG extended release tablet  Commonly known as: TOPROL XL     pantoprazole 20 MG tablet  Commonly known as: PROTONIX     potassium chloride 10 MEQ extended release tablet  Commonly known as: KLOR-CON M     warfarin 2.5 MG tablet  Commonly known as: COUMADIN            STOP taking these medications      lipase-protease-amylase 6000-53173 units delayed release capsule  Commonly known as: CREON  Replaced by: lipase-protease-amylase 5000-86541 units Cpep delayed release capsule               Where to Get Your Medications        You can get these medications from any pharmacy    Bring a paper prescription for each of these medications  clonazePAM 0.5 MG tablet  ferrous sulfate 325 (65 Fe) MG tablet  furosemide 20 MG tablet  glucose 4 g chewable tablet  ipratropium-albuterol 0.5-2.5 (3) MG/3ML Soln nebulizer solution  levothyroxine 100 MCG tablet  lipase-protease-amylase 5000-75878 units Cpep delayed release capsule  ondansetron 4 MG disintegrating tablet       Information about where to get these medications is not yet available    Ask your nurse or doctor about these medications  fludrocortisone 0.1 MG tablet  midodrine 10 MG tablet         Send Copies to: DANDRE Booth CNP,       Note that over 30 minutes was spent in preparing discharge papers, discussing discharge with patient and family, medication review, etc.    Signed:  DANDRE Rodas NP  11/1/2022, 2:13 PM

## 2022-11-01 NOTE — CARE COORDINATION
Transport arranged for patient to go to [facility] at [facility] via [email or web address]. Facility informed. Informed patient. Bedside nurse informed. Informed daughter Lynnette Medrano. Number for Report: [phone] oimd Berman Nurse    IMM letter given within 4 hours of discharge. Patient did not have any objections.      Electronically signed by CURRY Ovalles on 11/1/2022 at 11:35 AM

## 2022-11-01 NOTE — PROGRESS NOTES
Pharmacy Note  Warfarin Consult follow-up      Recent Labs     10/30/22  0626 10/31/22  0834 11/01/22  0504   INR 2.2 2.1 2.3     Recent Labs     10/30/22  0626 10/31/22  0834   HGB 9.2* 9.4*   HCT 27.6* 28.2*    221       Significant Drug-Drug Interactions:  New warfarin drug-drug interactions: none  Discontinued drug-drug interactions: none  Current warfarin drug-drug interactions: Florinef, Levothyoxine      Date             INR        Dose given previous day  Dose scheduled for today  11/1/2022            2.3       1.25 mg           1.25 mg        Notes:                     Daily PT/INR while inpatient.    [address], [place]   11/1/2022  10:10 AM

## 2022-11-02 ENCOUNTER — HOSPITAL ENCOUNTER (OUTPATIENT)
Age: 82
Setting detail: SPECIMEN
Discharge: HOME | End: 2022-11-02

## 2022-11-02 LAB
ALBUMIN SERPL-MCNC: 2.4 G/DL (ref 3.5–5.2)
ALBUMIN/GLOBULIN RATIO: 1.1 (ref 1–2.5)
ALP BLD-CCNC: 145 U/L (ref 35–104)
ALT SERPL-CCNC: 35 U/L (ref 5–33)
ANION GAP SERPL CALCULATED.3IONS-SCNC: 9 MMOL/L (ref 9–17)
AST SERPL-CCNC: 27 U/L
BILIRUB SERPL-MCNC: 0.4 MG/DL (ref 0.3–1.2)
BUN BLDV-MCNC: 12 MG/DL (ref 8–23)
CALCIUM SERPL-MCNC: 7.5 MG/DL (ref 8.6–10.4)
CHLORIDE BLD-SCNC: 103 MMOL/L (ref 98–107)
CO2: 29 MMOL/L (ref 20–31)
CREAT SERPL-MCNC: 0.58 MG/DL (ref 0.5–0.9)
CULTURE: NORMAL
CULTURE: NORMAL
GFR SERPL CREATININE-BSD FRML MDRD: >60 ML/MIN/1.73M2
GLUCOSE BLD-MCNC: 208 MG/DL (ref 70–99)
HCT VFR BLD CALC: 24.4 % (ref 36.3–47.1)
HEMOGLOBIN: 7.7 G/DL (ref 11.9–15.1)
INR BLD: 1.9
MCH RBC QN AUTO: 27.4 PG (ref 25.2–33.5)
MCHC RBC AUTO-ENTMCNC: 31.6 G/DL (ref 28.4–34.8)
MCV RBC AUTO: 86.8 FL (ref 82.6–102.9)
NRBC AUTOMATED: 0 PER 100 WBC
PDW BLD-RTO: 17 % (ref 11.8–14.4)
PLATELET # BLD: 207 K/UL (ref 138–453)
PMV BLD AUTO: 10.8 FL (ref 8.1–13.5)
POTASSIUM SERPL-SCNC: 3.5 MMOL/L (ref 3.7–5.3)
PROTHROMBIN TIME: 19.1 SEC (ref 9.1–12.3)
RBC # BLD: 2.81 M/UL (ref 3.95–5.11)
SODIUM BLD-SCNC: 141 MMOL/L (ref 135–144)
SPECIMEN DESCRIPTION: NORMAL
SPECIMEN DESCRIPTION: NORMAL
TOTAL PROTEIN: 4.6 G/DL (ref 6.4–8.3)
WBC # BLD: 5.3 K/UL (ref 3.5–11.3)

## 2022-11-02 PROCEDURE — 80053 COMPREHEN METABOLIC PANEL: CPT

## 2022-11-02 PROCEDURE — 36415 COLL VENOUS BLD VENIPUNCTURE: CPT

## 2022-11-02 PROCEDURE — 85027 COMPLETE CBC AUTOMATED: CPT

## 2022-11-02 PROCEDURE — 85610 PROTHROMBIN TIME: CPT

## 2022-11-03 LAB
COPPER, URINE /24 HR: 13.2 UG/D (ref 3–45)
COPPER, URINE /VOLUME: 1.2 UG/DL
COPPER, URINE RATIO CREATININE: 24.5 UG/G CRT (ref 10–45)
CREATININE URINE /24 HR: 539 MG/D (ref 500–1400)
CREATININE URINE /VOLUME: 49 MG/DL
HOURS COLLECTED: 24
URINE VOLUME: 1100

## 2022-11-04 LAB
ALPHA-1 ANTITRYPSIN PHENOTYPE: ABNORMAL
ALPHA-1 ANTITRYPSIN: 60 MG/DL (ref 90–200)
FAT QUALITATIVE SPLIT STOOL: ABNORMAL
FECAL NEUTRAL FAT: NORMAL

## 2022-11-05 ENCOUNTER — HOSPITAL ENCOUNTER (OUTPATIENT)
Age: 82
Setting detail: SPECIMEN
Discharge: HOME | End: 2022-11-05

## 2022-11-05 LAB — FECAL PANCREATIC ELASTASE-1: <10 UG/G

## 2022-11-05 PROCEDURE — 36415 COLL VENOUS BLD VENIPUNCTURE: CPT

## 2022-11-05 PROCEDURE — 82272 OCCULT BLD FECES 1-3 TESTS: CPT

## 2022-11-06 LAB
DATE, STOOL #1: NORMAL
HEMOCCULT SP1 STL QL: NEGATIVE
TIME, STOOL #1: 1030

## 2022-11-08 ENCOUNTER — HOSPITAL ENCOUNTER (OUTPATIENT)
Age: 82
Setting detail: SPECIMEN
Discharge: HOME | End: 2022-11-08

## 2022-11-08 PROCEDURE — 82272 OCCULT BLD FECES 1-3 TESTS: CPT

## 2022-11-09 LAB
DATE, STOOL #1: NORMAL
HEMOCCULT SP1 STL QL: NEGATIVE
TIME, STOOL #1: 2300

## 2022-11-10 ENCOUNTER — HOSPITAL ENCOUNTER (OUTPATIENT)
Age: 82
Setting detail: SPECIMEN
Discharge: HOME | End: 2022-11-10

## 2022-11-10 LAB
DATE, STOOL #1: NORMAL
HEMOCCULT SP1 STL QL: NEGATIVE
INR BLD: 1.6
PROTHROMBIN TIME: 16.8 SEC (ref 9.1–12.3)
TIME, STOOL #1: 20

## 2022-11-10 PROCEDURE — 36415 COLL VENOUS BLD VENIPUNCTURE: CPT

## 2022-11-10 PROCEDURE — P9603 ONE-WAY ALLOW PRORATED MILES: HCPCS

## 2022-11-10 PROCEDURE — 85610 PROTHROMBIN TIME: CPT

## 2022-11-10 PROCEDURE — 82272 OCCULT BLD FECES 1-3 TESTS: CPT

## 2022-11-12 ENCOUNTER — HOSPITAL ENCOUNTER (OUTPATIENT)
Age: 82
Setting detail: SPECIMEN
Discharge: HOME | End: 2022-11-12

## 2022-11-12 PROCEDURE — 87086 URINE CULTURE/COLONY COUNT: CPT

## 2022-11-12 PROCEDURE — 87186 SC STD MICRODIL/AGAR DIL: CPT

## 2022-11-12 PROCEDURE — 87077 CULTURE AEROBIC IDENTIFY: CPT

## 2022-11-13 LAB
CULTURE: ABNORMAL
Lab: ABNORMAL
SPECIMEN DESCRIPTION: ABNORMAL

## 2022-11-14 ENCOUNTER — HOSPITAL ENCOUNTER (OUTPATIENT)
Age: 82
Setting detail: SPECIMEN
Discharge: HOME | End: 2022-11-14

## 2022-11-14 LAB
INR BLD: 1.5
PROTHROMBIN TIME: 15.1 SEC (ref 9.1–12.3)

## 2022-11-14 PROCEDURE — P9603 ONE-WAY ALLOW PRORATED MILES: HCPCS

## 2022-11-14 PROCEDURE — 85610 PROTHROMBIN TIME: CPT

## 2022-11-14 PROCEDURE — 36415 COLL VENOUS BLD VENIPUNCTURE: CPT

## 2022-11-16 ENCOUNTER — HOSPITAL ENCOUNTER (OUTPATIENT)
Age: 82
Setting detail: SPECIMEN
Discharge: HOME | End: 2022-11-16

## 2022-11-16 LAB
ALBUMIN SERPL-MCNC: 1.9 G/DL (ref 3.5–5.2)
ALBUMIN/GLOBULIN RATIO: 1 (ref 1–2.5)
ALP BLD-CCNC: 140 U/L (ref 35–104)
ALT SERPL-CCNC: 23 U/L (ref 5–33)
ANION GAP SERPL CALCULATED.3IONS-SCNC: 6 MMOL/L (ref 9–17)
AST SERPL-CCNC: 18 U/L
BILIRUB SERPL-MCNC: 0.3 MG/DL (ref 0.3–1.2)
BUN BLDV-MCNC: 11 MG/DL (ref 8–23)
CALCIUM SERPL-MCNC: 7.3 MG/DL (ref 8.6–10.4)
CHLORIDE BLD-SCNC: 101 MMOL/L (ref 98–107)
CO2: 38 MMOL/L (ref 20–31)
CREAT SERPL-MCNC: 0.57 MG/DL (ref 0.5–0.9)
GFR SERPL CREATININE-BSD FRML MDRD: >60 ML/MIN/1.73M2
GLUCOSE BLD-MCNC: 39 MG/DL (ref 70–99)
HCT VFR BLD CALC: 25 % (ref 36.3–47.1)
HEMOGLOBIN: 8.1 G/DL (ref 11.9–15.1)
MCH RBC QN AUTO: 28.3 PG (ref 25.2–33.5)
MCHC RBC AUTO-ENTMCNC: 32.4 G/DL (ref 28.4–34.8)
MCV RBC AUTO: 87.4 FL (ref 82.6–102.9)
NRBC AUTOMATED: 0 PER 100 WBC
PDW BLD-RTO: 18.3 % (ref 11.8–14.4)
PLATELET # BLD: 330 K/UL (ref 138–453)
PMV BLD AUTO: 10.2 FL (ref 8.1–13.5)
POTASSIUM SERPL-SCNC: 2.1 MMOL/L (ref 3.7–5.3)
RBC # BLD: 2.86 M/UL (ref 3.95–5.11)
SODIUM BLD-SCNC: 145 MMOL/L (ref 135–144)
TOTAL PROTEIN: 3.9 G/DL (ref 6.4–8.3)
WBC # BLD: 4.6 K/UL (ref 3.5–11.3)

## 2022-11-16 PROCEDURE — P9603 ONE-WAY ALLOW PRORATED MILES: HCPCS

## 2022-11-16 PROCEDURE — 36415 COLL VENOUS BLD VENIPUNCTURE: CPT

## 2022-11-16 PROCEDURE — 80053 COMPREHEN METABOLIC PANEL: CPT

## 2022-11-16 PROCEDURE — 85027 COMPLETE CBC AUTOMATED: CPT

## 2022-11-17 ENCOUNTER — HOSPITAL ENCOUNTER (OUTPATIENT)
Age: 82
Setting detail: SPECIMEN
Discharge: HOME | End: 2022-11-17

## 2022-11-17 LAB
INR BLD: 1.8
PROTHROMBIN TIME: 18.7 SEC (ref 9.1–12.3)

## 2022-11-17 PROCEDURE — 36415 COLL VENOUS BLD VENIPUNCTURE: CPT

## 2022-11-17 PROCEDURE — 85610 PROTHROMBIN TIME: CPT

## 2022-11-17 PROCEDURE — P9603 ONE-WAY ALLOW PRORATED MILES: HCPCS

## 2022-11-18 ENCOUNTER — HOSPITAL ENCOUNTER (OUTPATIENT)
Age: 82
Setting detail: SPECIMEN
Discharge: HOME | End: 2022-11-18

## 2022-11-18 LAB
INR BLD: 2.2
POTASSIUM SERPL-SCNC: 2.7 MMOL/L (ref 3.7–5.3)
PROTHROMBIN TIME: 21.9 SEC (ref 9.1–12.3)

## 2022-11-18 PROCEDURE — 84132 ASSAY OF SERUM POTASSIUM: CPT

## 2022-11-18 PROCEDURE — 36415 COLL VENOUS BLD VENIPUNCTURE: CPT

## 2022-11-18 PROCEDURE — P9603 ONE-WAY ALLOW PRORATED MILES: HCPCS

## 2022-11-18 PROCEDURE — 85610 PROTHROMBIN TIME: CPT
